# Patient Record
Sex: FEMALE | Race: WHITE | Employment: OTHER | URBAN - NONMETROPOLITAN AREA
[De-identification: names, ages, dates, MRNs, and addresses within clinical notes are randomized per-mention and may not be internally consistent; named-entity substitution may affect disease eponyms.]

---

## 2017-01-17 ENCOUNTER — ANTI-COAG VISIT (OUTPATIENT)
Dept: OTHER | Age: 82
End: 2017-01-17

## 2017-01-17 VITALS
WEIGHT: 175 LBS | BODY MASS INDEX: 28.25 KG/M2 | SYSTOLIC BLOOD PRESSURE: 135 MMHG | HEART RATE: 62 BPM | DIASTOLIC BLOOD PRESSURE: 70 MMHG

## 2017-01-17 DIAGNOSIS — I26.99 OTHER PULMONARY EMBOLISM WITHOUT ACUTE COR PULMONALE, UNSPECIFIED CHRONICITY (HCC): ICD-10-CM

## 2017-01-17 LAB — POC INR: 2.4 (ref 0.8–1.2)

## 2017-01-17 PROCEDURE — 85610 PROTHROMBIN TIME: CPT | Performed by: NURSE PRACTITIONER

## 2017-01-17 PROCEDURE — 99999 PR OFFICE/OUTPT VISIT,PROCEDURE ONLY: CPT | Performed by: NURSE PRACTITIONER

## 2017-01-17 ASSESSMENT — ENCOUNTER SYMPTOMS
BLOOD IN STOOL: 0
SHORTNESS OF BREATH: 0
CONSTIPATION: 0
DIARRHEA: 0

## 2017-02-02 ENCOUNTER — TELEPHONE (OUTPATIENT)
Dept: OTHER | Age: 82
End: 2017-02-02

## 2017-02-14 ENCOUNTER — ANTI-COAG VISIT (OUTPATIENT)
Dept: OTHER | Age: 82
End: 2017-02-14

## 2017-02-14 VITALS
WEIGHT: 173 LBS | HEART RATE: 61 BPM | SYSTOLIC BLOOD PRESSURE: 123 MMHG | BODY MASS INDEX: 27.92 KG/M2 | DIASTOLIC BLOOD PRESSURE: 67 MMHG

## 2017-02-14 DIAGNOSIS — I26.99 OTHER PULMONARY EMBOLISM WITHOUT ACUTE COR PULMONALE, UNSPECIFIED CHRONICITY (HCC): ICD-10-CM

## 2017-02-14 LAB — POC INR: 1.6 (ref 0.8–1.2)

## 2017-02-14 PROCEDURE — 85610 PROTHROMBIN TIME: CPT | Performed by: NURSE PRACTITIONER

## 2017-02-14 PROCEDURE — 99999 PR OFFICE/OUTPT VISIT,PROCEDURE ONLY: CPT | Performed by: NURSE PRACTITIONER

## 2017-02-14 ASSESSMENT — ENCOUNTER SYMPTOMS
BLOOD IN STOOL: 0
SHORTNESS OF BREATH: 0
DIARRHEA: 0
CONSTIPATION: 0

## 2017-03-13 DIAGNOSIS — I26.99 OTHER PULMONARY EMBOLISM WITHOUT ACUTE COR PULMONALE, UNSPECIFIED CHRONICITY (HCC): Primary | ICD-10-CM

## 2017-03-20 ENCOUNTER — ANTI-COAG VISIT (OUTPATIENT)
Dept: OTHER | Age: 82
End: 2017-03-20

## 2017-03-20 VITALS
SYSTOLIC BLOOD PRESSURE: 149 MMHG | BODY MASS INDEX: 27.73 KG/M2 | HEART RATE: 73 BPM | DIASTOLIC BLOOD PRESSURE: 86 MMHG | WEIGHT: 171.8 LBS

## 2017-03-20 DIAGNOSIS — I26.99 OTHER PULMONARY EMBOLISM WITHOUT ACUTE COR PULMONALE, UNSPECIFIED CHRONICITY (HCC): ICD-10-CM

## 2017-03-20 LAB — POC INR: 1.9 (ref 0.8–1.2)

## 2017-03-20 ASSESSMENT — ENCOUNTER SYMPTOMS
SHORTNESS OF BREATH: 1
CONSTIPATION: 0
DIARRHEA: 0
BLOOD IN STOOL: 0

## 2017-04-17 ENCOUNTER — ANTI-COAG VISIT (OUTPATIENT)
Dept: OTHER | Age: 82
End: 2017-04-17

## 2017-04-17 VITALS
DIASTOLIC BLOOD PRESSURE: 71 MMHG | HEART RATE: 70 BPM | WEIGHT: 171.8 LBS | BODY MASS INDEX: 27.73 KG/M2 | SYSTOLIC BLOOD PRESSURE: 132 MMHG

## 2017-04-17 DIAGNOSIS — I26.99 OTHER PULMONARY EMBOLISM WITHOUT ACUTE COR PULMONALE, UNSPECIFIED CHRONICITY (HCC): ICD-10-CM

## 2017-04-17 LAB — POC INR: 1.8 (ref 0.8–1.2)

## 2017-04-17 ASSESSMENT — ENCOUNTER SYMPTOMS
DIARRHEA: 0
BLOOD IN STOOL: 0
CONSTIPATION: 0
SHORTNESS OF BREATH: 0

## 2017-05-15 ENCOUNTER — ANTI-COAG VISIT (OUTPATIENT)
Dept: OTHER | Age: 82
End: 2017-05-15

## 2017-05-15 VITALS
DIASTOLIC BLOOD PRESSURE: 69 MMHG | SYSTOLIC BLOOD PRESSURE: 132 MMHG | BODY MASS INDEX: 27.57 KG/M2 | WEIGHT: 170.8 LBS | HEART RATE: 60 BPM

## 2017-05-15 DIAGNOSIS — I26.99 OTHER PULMONARY EMBOLISM WITHOUT ACUTE COR PULMONALE, UNSPECIFIED CHRONICITY (HCC): ICD-10-CM

## 2017-05-15 LAB
HCT VFR BLD CALC: 41.8 % (ref 37–47)
HEMOGLOBIN: 13.9 GM/DL (ref 12–16)
POC INR: 2.1 (ref 0.8–1.2)

## 2017-05-15 ASSESSMENT — ENCOUNTER SYMPTOMS
BLOOD IN STOOL: 0
CONSTIPATION: 0
DIARRHEA: 0
SHORTNESS OF BREATH: 0

## 2017-06-15 ENCOUNTER — ANTI-COAG VISIT (OUTPATIENT)
Dept: OTHER | Age: 82
End: 2017-06-15

## 2017-06-15 VITALS
SYSTOLIC BLOOD PRESSURE: 124 MMHG | BODY MASS INDEX: 28.05 KG/M2 | DIASTOLIC BLOOD PRESSURE: 66 MMHG | WEIGHT: 173.8 LBS | HEART RATE: 67 BPM

## 2017-06-15 DIAGNOSIS — I26.99 OTHER PULMONARY EMBOLISM WITHOUT ACUTE COR PULMONALE, UNSPECIFIED CHRONICITY (HCC): ICD-10-CM

## 2017-06-15 LAB
INR BLD: 2.6
POC INR: 2.6 (ref 0.8–1.2)

## 2017-06-15 RX ORDER — WARFARIN SODIUM 2 MG/1
TABLET ORAL
Qty: 60 TABLET | Refills: 11 | Status: SHIPPED | OUTPATIENT
Start: 2017-06-15 | End: 2018-06-26 | Stop reason: SDUPTHER

## 2017-06-15 ASSESSMENT — ENCOUNTER SYMPTOMS
SHORTNESS OF BREATH: 1
BLOOD IN STOOL: 0
DIARRHEA: 0
CONSTIPATION: 0

## 2017-07-13 ENCOUNTER — ANTI-COAG VISIT (OUTPATIENT)
Dept: OTHER | Age: 82
End: 2017-07-13

## 2017-07-13 VITALS
SYSTOLIC BLOOD PRESSURE: 138 MMHG | DIASTOLIC BLOOD PRESSURE: 71 MMHG | HEART RATE: 65 BPM | BODY MASS INDEX: 28.25 KG/M2 | WEIGHT: 175 LBS

## 2017-07-13 DIAGNOSIS — I26.99 OTHER PULMONARY EMBOLISM WITHOUT ACUTE COR PULMONALE, UNSPECIFIED CHRONICITY (HCC): ICD-10-CM

## 2017-07-13 LAB — POC INR: 2.5 (ref 0.8–1.2)

## 2017-07-13 ASSESSMENT — ENCOUNTER SYMPTOMS
SHORTNESS OF BREATH: 0
DIARRHEA: 0
CONSTIPATION: 0
BLOOD IN STOOL: 0

## 2017-08-10 ENCOUNTER — HOSPITAL ENCOUNTER (OUTPATIENT)
Dept: PHARMACY | Age: 82
Setting detail: THERAPIES SERIES
Discharge: HOME | End: 2017-08-10
Payer: MEDICARE

## 2017-08-10 VITALS
DIASTOLIC BLOOD PRESSURE: 68 MMHG | HEART RATE: 62 BPM | BODY MASS INDEX: 27.6 KG/M2 | WEIGHT: 171 LBS | SYSTOLIC BLOOD PRESSURE: 122 MMHG

## 2017-08-10 DIAGNOSIS — I26.99 OTHER PULMONARY EMBOLISM WITHOUT ACUTE COR PULMONALE, UNSPECIFIED CHRONICITY (HCC): ICD-10-CM

## 2017-08-10 LAB — POC INR: 2 (ref 0.8–1.2)

## 2017-08-10 PROCEDURE — 99211 OFF/OP EST MAY X REQ PHY/QHP: CPT

## 2017-08-10 PROCEDURE — 36416 COLLJ CAPILLARY BLOOD SPEC: CPT

## 2017-08-10 PROCEDURE — 85610 PROTHROMBIN TIME: CPT

## 2017-08-10 ASSESSMENT — ENCOUNTER SYMPTOMS
DIARRHEA: 0
BLOOD IN STOOL: 0
SHORTNESS OF BREATH: 0
CONSTIPATION: 0

## 2017-09-11 ENCOUNTER — HOSPITAL ENCOUNTER (OUTPATIENT)
Dept: PHARMACY | Age: 82
Setting detail: THERAPIES SERIES
Discharge: HOME | End: 2017-09-11
Payer: MEDICARE

## 2017-09-11 VITALS
SYSTOLIC BLOOD PRESSURE: 131 MMHG | HEART RATE: 62 BPM | WEIGHT: 170 LBS | DIASTOLIC BLOOD PRESSURE: 73 MMHG | BODY MASS INDEX: 27.44 KG/M2

## 2017-09-11 DIAGNOSIS — I26.99 OTHER PULMONARY EMBOLISM WITHOUT ACUTE COR PULMONALE, UNSPECIFIED CHRONICITY (HCC): ICD-10-CM

## 2017-09-11 LAB — POC INR: 2 (ref 0.8–1.2)

## 2017-09-11 PROCEDURE — 36416 COLLJ CAPILLARY BLOOD SPEC: CPT

## 2017-09-11 PROCEDURE — 99211 OFF/OP EST MAY X REQ PHY/QHP: CPT

## 2017-09-11 PROCEDURE — 85610 PROTHROMBIN TIME: CPT

## 2017-09-11 ASSESSMENT — ENCOUNTER SYMPTOMS
SHORTNESS OF BREATH: 0
DIARRHEA: 0
BLOOD IN STOOL: 0
CONSTIPATION: 0

## 2017-10-09 ENCOUNTER — HOSPITAL ENCOUNTER (OUTPATIENT)
Dept: PHARMACY | Age: 82
Setting detail: THERAPIES SERIES
Discharge: HOME | End: 2017-10-09
Payer: MEDICARE

## 2017-10-09 VITALS
HEART RATE: 80 BPM | BODY MASS INDEX: 28.34 KG/M2 | SYSTOLIC BLOOD PRESSURE: 128 MMHG | WEIGHT: 175.6 LBS | DIASTOLIC BLOOD PRESSURE: 75 MMHG

## 2017-10-09 DIAGNOSIS — I26.99 OTHER PULMONARY EMBOLISM WITHOUT ACUTE COR PULMONALE, UNSPECIFIED CHRONICITY (HCC): ICD-10-CM

## 2017-10-09 LAB — POC INR: 2.6 (ref 0.8–1.2)

## 2017-10-09 PROCEDURE — 85610 PROTHROMBIN TIME: CPT

## 2017-10-09 PROCEDURE — 99211 OFF/OP EST MAY X REQ PHY/QHP: CPT

## 2017-10-09 PROCEDURE — 36416 COLLJ CAPILLARY BLOOD SPEC: CPT

## 2017-10-09 ASSESSMENT — ENCOUNTER SYMPTOMS
DIARRHEA: 0
BLOOD IN STOOL: 0
SHORTNESS OF BREATH: 0
CONSTIPATION: 0

## 2017-10-09 NOTE — PROGRESS NOTES
The Medication Management [facility]  Anticoagulation Clinic  [phone] (phone)           [phone] (fax)    Visit Date: 10/9/2017     Subjective:       Patient ID: Estrella Biggs, 80 y.o., female    HPI  Patient seen in clinic for anticoagulation management for diagnosis of PE with a goal INR of 2.0-3.0. Last seen 4 weeks ago. States correct coumadin dose and tablet strength. Denies missed doses. Has been eating more dark greens over last month. Review of Systems   Constitutional: Negative for activity change, appetite change and fatigue. HENT: Negative for nosebleeds. Respiratory: Negative for shortness of breath. Cardiovascular: Negative for chest pain and leg swelling. Gastrointestinal: Negative for blood in stool, constipation and diarrhea. Genitourinary: Negative for hematuria. Neurological: Negative for dizziness, weakness, light-headedness and headaches. Hematological: Bruises/bleeds easily ( has a fading yellow bruise on right upper arm, origin unknown. No bleeding. ).        Objective:   Physical Exam   Vitals:    10/09/17 1320   BP: 128/75   Pulse: 80       Physical Exam   Constitutional: She is oriented to person, place, and time. She appears well-developed and well-nourished. Cardiovascular: Normal rate. Pulmonary/Chest: Effort normal.   Neurological: She is alert and oriented to person, place, and time. Psychiatric: She has a normal mood and affect. Her behavior is normal.       Assessment:     Lab Results   Component Value Date    INR 2.60 (H) 10/09/2017    INR 2.00 (H) 09/11/2017    INR 2.00 (H) 08/10/2017     INR therapeutic   Recent Labs      10/09/17   1320   INR  2.60*                          Plan:   POCT INR ordered and result reviewed. Continue Coumadin 2 mg po W, 4 mg po MTTHFSS. Recheck INR 4 weeks. Patient reminded to call the Anticoagulation Clinic with any signs or symptoms of bleeding or with any medication changes.   Patient

## 2017-11-06 ENCOUNTER — HOSPITAL ENCOUNTER (OUTPATIENT)
Dept: PHARMACY | Age: 82
Setting detail: THERAPIES SERIES
Discharge: HOME | End: 2017-11-06
Payer: MEDICARE

## 2017-11-06 VITALS
WEIGHT: 173.6 LBS | BODY MASS INDEX: 28.02 KG/M2 | DIASTOLIC BLOOD PRESSURE: 71 MMHG | HEART RATE: 66 BPM | SYSTOLIC BLOOD PRESSURE: 145 MMHG

## 2017-11-06 DIAGNOSIS — I26.99 OTHER PULMONARY EMBOLISM WITHOUT ACUTE COR PULMONALE, UNSPECIFIED CHRONICITY (HCC): ICD-10-CM

## 2017-11-06 LAB — POC INR: 2.9 (ref 0.8–1.2)

## 2017-11-06 PROCEDURE — 99211 OFF/OP EST MAY X REQ PHY/QHP: CPT

## 2017-11-06 PROCEDURE — 36416 COLLJ CAPILLARY BLOOD SPEC: CPT

## 2017-11-06 PROCEDURE — 85610 PROTHROMBIN TIME: CPT

## 2017-11-06 ASSESSMENT — ENCOUNTER SYMPTOMS
CONSTIPATION: 0
BLOOD IN STOOL: 0
SHORTNESS OF BREATH: 0
DIARRHEA: 0

## 2017-11-15 ENCOUNTER — TELEPHONE (OUTPATIENT)
Dept: PHARMACY | Age: 82
End: 2017-11-15

## 2017-11-15 NOTE — TELEPHONE ENCOUNTER
Emma called back and she said her dosing wrong. She is taking Ciparo 500 mg bid x 10 days and she started her medication last night 11/14/17.

## 2017-11-15 NOTE — TELEPHONE ENCOUNTER
Left message for patient to return call. Will have patient decrease dose to 2mg MWF, 4mg TThSS and rechk INR at beginning of next week.

## 2017-11-22 ENCOUNTER — HOSPITAL ENCOUNTER (OUTPATIENT)
Dept: PHARMACY | Age: 82
Setting detail: THERAPIES SERIES
Discharge: HOME | End: 2017-11-22
Payer: MEDICARE

## 2017-11-22 VITALS
DIASTOLIC BLOOD PRESSURE: 69 MMHG | BODY MASS INDEX: 28.25 KG/M2 | WEIGHT: 175 LBS | SYSTOLIC BLOOD PRESSURE: 129 MMHG | HEART RATE: 68 BPM

## 2017-11-22 DIAGNOSIS — I26.99 OTHER PULMONARY EMBOLISM WITHOUT ACUTE COR PULMONALE, UNSPECIFIED CHRONICITY (HCC): ICD-10-CM

## 2017-11-22 LAB — POC INR: 2 (ref 0.8–1.2)

## 2017-11-22 PROCEDURE — 36416 COLLJ CAPILLARY BLOOD SPEC: CPT

## 2017-11-22 PROCEDURE — 99211 OFF/OP EST MAY X REQ PHY/QHP: CPT

## 2017-11-22 PROCEDURE — 85610 PROTHROMBIN TIME: CPT

## 2017-11-22 NOTE — PROGRESS NOTES
The Medication Management [facility]  Anticoagulation Clinic  [phone] (phone)           [phone] (fax)    Visit Date: 11/22/2017     Subjective:       Patient ID: Flavia Curtis, 80 y.o., female    HPI      Patient presents for 5 week INR check. Patient in for anticoagulation management due to PE with a goal INR of 2.0-3.0. INR ordered and reviewed today. Patient reports the following:    Patient shows me her bottle of cipro and states one of the pills in the bottle is different than the other - identified the pill to be FiberCon. Medication changes: cipro 500 mg for 10 days (started on 11/14/17)  Tablet strength per patient: 2 mg   Patient reported dosing regimen over last 1 week: 2 mg MWF, 4 mg STTHS while on cipro  Missed doses in the last 1-2 weeks: no  Extra doses in the last 1-2 weeks: no  Any problems with bleeding/bruising? No  Any recent falls? No   Any signs or symptoms of DVT/PE or stroke? Yes, swelling in foot (patient believes it is arthritis) - states it is nothing new  Alcohol use: no   Tobacco use: no  Diet changes as follows: Hasn't been eating as much in last few weeks - trying to lose some weight  Upcoming surgeries or procedures: no  Appointment preference: PM    Objective:   Physical Exam   Vitals:    11/22/17 1543   BP: 129/69   Pulse: 68     Assessment:     Lab Results   Component Value Date    INR 2.00 (H) 11/22/2017    INR 2.90 (H) 11/06/2017    INR 2.60 (H) 10/09/2017     INR therapeutic   Recent Labs      11/22/17   1517   INR  2.00*                          Plan:     Continue Coumadin 2 mg W, 4 mg SMTTHFS. Recheck INR 5 weeks. Patient reminded to call the Anticoagulation Clinic with any signs or symptoms of bleeding or with any medication changes. Patient given instructions utilizing the teach back method. Discharged ambulatory in no apparent distress.

## 2017-12-27 ENCOUNTER — HOSPITAL ENCOUNTER (OUTPATIENT)
Dept: PHARMACY | Age: 82
Setting detail: THERAPIES SERIES
Discharge: HOME | End: 2017-12-27
Payer: MEDICARE

## 2017-12-27 DIAGNOSIS — I26.99 OTHER PULMONARY EMBOLISM WITHOUT ACUTE COR PULMONALE, UNSPECIFIED CHRONICITY (HCC): ICD-10-CM

## 2017-12-27 LAB — POC INR: 3.9 (ref 0.8–1.2)

## 2017-12-27 PROCEDURE — 99211 OFF/OP EST MAY X REQ PHY/QHP: CPT

## 2017-12-27 PROCEDURE — 36416 COLLJ CAPILLARY BLOOD SPEC: CPT

## 2017-12-27 PROCEDURE — 85610 PROTHROMBIN TIME: CPT

## 2017-12-27 NOTE — PROGRESS NOTES
The [address] [place] [address]  Anticoagulation Clinic  [phone] (phone)  [phone] (fax)  Patient presents for 5 week INR check. Patient in for anticoagulation management due to PE with a goal INR of 2.0-3.0. INR ordered and reviewed today. Patient reports the following:    Medication changes: no change  Tablet strength per patient: 2 mg   Patient reported dosing regimen over last 1 week: 2 mg W, 4 mg SMTTHFS  Missed doses in the last 1-2 weeks: no  Extra doses in the last 1-2 weeks: no  Any problems with bleeding/bruising? No  Any recent falls? Hit head with car door - did not fall  Any signs or symptoms of DVT/PE or stroke? SOB at baseline  Alcohol use: no change  Tobacco use: no change  Diet changes as follows: rice and broccoli  Upcoming surgeries or procedures: no  Appointment preference: PM  Patient states she has been under a lot of stress with weather, holidays and family parties. Lab Results   Component Value Date    INR 2.00 (H) 11/22/2017    INR 2.90 (H) 11/06/2017    INR 2.60 (H) 10/09/2017       Plan:  Jamas Case today 12/17/17, then 2 mg tomorrow (12/25/17) then continue Coumadin 2 mg W, 4 mg SMTTHFS. Recheck INR 2 weeks. Patient reminded to call the Anticoagulation Clinic with any signs or symptoms of bleeding or with any medication changes. Patient given instructions utilizing the teach back method. Discharged ambulatory in no apparent distress. After visit summary printed and reviewed with patient.       Medications reviewed and updated on home medication list Yes    Influenza vaccine:     [] given    [x] declined   [] received previously   [] plans to receive at a later time   [x] refused    [] documented in EPIC

## 2018-01-09 ENCOUNTER — HOSPITAL ENCOUNTER (OUTPATIENT)
Dept: PHARMACY | Age: 83
Setting detail: THERAPIES SERIES
Discharge: HOME | End: 2018-01-09
Payer: MEDICARE

## 2018-01-09 DIAGNOSIS — I26.99 OTHER PULMONARY EMBOLISM WITHOUT ACUTE COR PULMONALE, UNSPECIFIED CHRONICITY (HCC): ICD-10-CM

## 2018-01-09 LAB — POC INR: 2.1 (ref 0.8–1.2)

## 2018-01-09 PROCEDURE — 36416 COLLJ CAPILLARY BLOOD SPEC: CPT

## 2018-01-09 PROCEDURE — 99211 OFF/OP EST MAY X REQ PHY/QHP: CPT

## 2018-01-09 PROCEDURE — 85610 PROTHROMBIN TIME: CPT

## 2018-01-30 ENCOUNTER — HOSPITAL ENCOUNTER (OUTPATIENT)
Dept: PHARMACY | Age: 83
Setting detail: THERAPIES SERIES
Discharge: HOME | End: 2018-01-30
Payer: MEDICARE

## 2018-01-30 VITALS — TEMPERATURE: 98.5 F

## 2018-01-30 DIAGNOSIS — I26.99 OTHER PULMONARY EMBOLISM WITHOUT ACUTE COR PULMONALE, UNSPECIFIED CHRONICITY (HCC): ICD-10-CM

## 2018-01-30 LAB — POC INR: 2.5 (ref 0.8–1.2)

## 2018-01-30 PROCEDURE — 90686 IIV4 VACC NO PRSV 0.5 ML IM: CPT

## 2018-01-30 PROCEDURE — 6360000002 HC RX W HCPCS

## 2018-01-30 PROCEDURE — 85610 PROTHROMBIN TIME: CPT

## 2018-01-30 PROCEDURE — G0008 ADMIN INFLUENZA VIRUS VAC: HCPCS

## 2018-01-30 PROCEDURE — G0463 HOSPITAL OUTPT CLINIC VISIT: HCPCS

## 2018-01-30 PROCEDURE — 36416 COLLJ CAPILLARY BLOOD SPEC: CPT

## 2018-01-30 RX ORDER — 1.1% SODIUM FLUORIDE PRESCRIPTION DENTAL CREAM 5 MG/G
CREAM DENTAL 2 TIMES DAILY
COMMUNITY
Start: 2018-01-06

## 2018-01-30 RX ADMIN — INFLUENZA A VIRUS A/SINGAPORE/GP1908/2015 IVR-180A (H1N1) ANTIGEN (PROPIOLACTONE INACTIVATED), INFLUENZA A VIRUS A/HONG KONG/4801/2014 X-263B (H3N2) ANTIGEN (PROPIOLACTONE INACTIVATED), INFLUENZA B VIRUS B/BRISBANE/46/2015 ANTIGEN (PROPIOLACTONE INACTIVATED), AND INFLUENZA B VIRUS B/PHUKET/3073/2013 BVR-1B ANTIGEN (PROPIOLACTONE INACTIVATED) 0.5 ML: 15; 15; 15; 15 INJECTION, SUSPENSION INTRAMUSCULAR at 13:30

## 2018-02-04 ENCOUNTER — HOSPITAL ENCOUNTER (INPATIENT)
Age: 83
LOS: 3 days | Discharge: HOME | DRG: 193 | End: 2018-02-07
Attending: INTERNAL MEDICINE | Admitting: INTERNAL MEDICINE
Payer: MEDICARE

## 2018-02-04 ENCOUNTER — APPOINTMENT (OUTPATIENT)
Dept: GENERAL RADIOLOGY | Age: 83
DRG: 193 | End: 2018-02-04
Payer: MEDICARE

## 2018-02-04 DIAGNOSIS — J10.1 INFLUENZA B: Primary | ICD-10-CM

## 2018-02-04 DIAGNOSIS — E86.0 DEHYDRATION: ICD-10-CM

## 2018-02-04 DIAGNOSIS — R74.01 TRANSAMINASEMIA: ICD-10-CM

## 2018-02-04 LAB
ALBUMIN SERPL-MCNC: 3.7 G/DL (ref 3.5–5.1)
ALLEN TEST: POSITIVE
ALP BLD-CCNC: 86 U/L (ref 38–126)
ALT SERPL-CCNC: 86 U/L (ref 11–66)
ANION GAP SERPL CALCULATED.3IONS-SCNC: 16 MEQ/L (ref 8–16)
AST SERPL-CCNC: 134 U/L (ref 5–40)
BASE EXCESS (CALCULATED): 2.1 MMOL/L (ref -2.5–2.5)
BASOPHILS # BLD: 0.1 %
BASOPHILS ABSOLUTE: 0 THOU/MM3 (ref 0–0.1)
BILIRUB SERPL-MCNC: 0.4 MG/DL (ref 0.3–1.2)
BUN BLDV-MCNC: 19 MG/DL (ref 7–22)
CALCIUM SERPL-MCNC: 8.5 MG/DL (ref 8.5–10.5)
CHLORIDE BLD-SCNC: 94 MEQ/L (ref 98–111)
CO2: 26 MEQ/L (ref 23–33)
COLLECTED BY:: ABNORMAL
CREAT SERPL-MCNC: 0.8 MG/DL (ref 0.4–1.2)
DEVICE: ABNORMAL
EKG ATRIAL RATE: 74 BPM
EKG P AXIS: 53 DEGREES
EKG P-R INTERVAL: 156 MS
EKG Q-T INTERVAL: 414 MS
EKG QRS DURATION: 78 MS
EKG QTC CALCULATION (BAZETT): 459 MS
EKG R AXIS: -3 DEGREES
EKG T AXIS: 4 DEGREES
EKG VENTRICULAR RATE: 74 BPM
EOSINOPHIL # BLD: 0 %
EOSINOPHILS ABSOLUTE: 0 THOU/MM3 (ref 0–0.4)
FLU A ANTIGEN: NEGATIVE
FLU B ANTIGEN: POSITIVE
GFR SERPL CREATININE-BSD FRML MDRD: 68 ML/MIN/1.73M2
GLUCOSE BLD-MCNC: 144 MG/DL (ref 70–108)
HCO3: 28 MMOL/L (ref 23–28)
HCT VFR BLD CALC: 41.3 % (ref 37–47)
HEMOGLOBIN: 13.5 GM/DL (ref 12–16)
IFIO2: 2
INR BLD: 1.58 (ref 0.85–1.13)
LACTIC ACID: 1.7 MMOL/L (ref 0.5–2.2)
LYMPHOCYTES # BLD: 14.6 %
LYMPHOCYTES ABSOLUTE: 1 THOU/MM3 (ref 1–4.8)
MCH RBC QN AUTO: 28.2 PG (ref 27–31)
MCHC RBC AUTO-ENTMCNC: 32.7 GM/DL (ref 33–37)
MCV RBC AUTO: 86.3 FL (ref 81–99)
MONOCYTES # BLD: 9.7 %
MONOCYTES ABSOLUTE: 0.7 THOU/MM3 (ref 0.4–1.3)
NUCLEATED RED BLOOD CELLS: 0 /100 WBC
O2 SATURATION: 92 %
OSMOLALITY CALCULATION: 276.7 MOSMOL/KG (ref 275–300)
PCO2: 47 MMHG (ref 35–45)
PDW BLD-RTO: 14.2 % (ref 11.5–14.5)
PH BLOOD GAS: 7.38 (ref 7.35–7.45)
PLATELET # BLD: 152 THOU/MM3 (ref 130–400)
PMV BLD AUTO: 9.9 FL (ref 7.4–10.4)
PO2: 65 MMHG (ref 71–104)
POTASSIUM SERPL-SCNC: 3.6 MEQ/L (ref 3.5–5.2)
RBC # BLD: 4.79 MILL/MM3 (ref 4.2–5.4)
SEG NEUTROPHILS: 75.6 %
SEGMENTED NEUTROPHILS ABSOLUTE COUNT: 5.4 THOU/MM3 (ref 1.8–7.7)
SODIUM BLD-SCNC: 136 MEQ/L (ref 135–145)
TOTAL PROTEIN: 7.2 G/DL (ref 6.1–8)
WBC # BLD: 7.1 THOU/MM3 (ref 4.8–10.8)

## 2018-02-04 PROCEDURE — 2580000003 HC RX 258: Performed by: INTERNAL MEDICINE

## 2018-02-04 PROCEDURE — 85025 COMPLETE CBC W/AUTO DIFF WBC: CPT

## 2018-02-04 PROCEDURE — 6370000000 HC RX 637 (ALT 250 FOR IP): Performed by: INTERNAL MEDICINE

## 2018-02-04 PROCEDURE — 93005 ELECTROCARDIOGRAM TRACING: CPT

## 2018-02-04 PROCEDURE — 87804 INFLUENZA ASSAY W/OPTIC: CPT

## 2018-02-04 PROCEDURE — 96374 THER/PROPH/DIAG INJ IV PUSH: CPT

## 2018-02-04 PROCEDURE — 80053 COMPREHEN METABOLIC PANEL: CPT

## 2018-02-04 PROCEDURE — 93000 ELECTROCARDIOGRAM COMPLETE: CPT | Performed by: INTERNAL MEDICINE

## 2018-02-04 PROCEDURE — 99285 EMERGENCY DEPT VISIT HI MDM: CPT

## 2018-02-04 PROCEDURE — 83605 ASSAY OF LACTIC ACID: CPT

## 2018-02-04 PROCEDURE — 71046 X-RAY EXAM CHEST 2 VIEWS: CPT

## 2018-02-04 PROCEDURE — 36415 COLL VENOUS BLD VENIPUNCTURE: CPT

## 2018-02-04 PROCEDURE — 6360000002 HC RX W HCPCS: Performed by: INTERNAL MEDICINE

## 2018-02-04 PROCEDURE — 85610 PROTHROMBIN TIME: CPT

## 2018-02-04 PROCEDURE — 82803 BLOOD GASES ANY COMBINATION: CPT

## 2018-02-04 PROCEDURE — 87040 BLOOD CULTURE FOR BACTERIA: CPT

## 2018-02-04 PROCEDURE — 1200000000 HC SEMI PRIVATE

## 2018-02-04 RX ORDER — DILTIAZEM HYDROCHLORIDE 180 MG/1
360 CAPSULE, COATED, EXTENDED RELEASE ORAL DAILY
Status: DISCONTINUED | OUTPATIENT
Start: 2018-02-04 | End: 2018-02-07 | Stop reason: HOSPADM

## 2018-02-04 RX ORDER — ONDANSETRON 2 MG/ML
4 INJECTION INTRAMUSCULAR; INTRAVENOUS EVERY 6 HOURS PRN
Status: DISCONTINUED | OUTPATIENT
Start: 2018-02-04 | End: 2018-02-07 | Stop reason: HOSPADM

## 2018-02-04 RX ORDER — 0.9 % SODIUM CHLORIDE 0.9 %
1000 INTRAVENOUS SOLUTION INTRAVENOUS ONCE
Status: COMPLETED | OUTPATIENT
Start: 2018-02-04 | End: 2018-02-04

## 2018-02-04 RX ORDER — POTASSIUM CHLORIDE 7.45 MG/ML
10 INJECTION INTRAVENOUS PRN
Status: DISCONTINUED | OUTPATIENT
Start: 2018-02-04 | End: 2018-02-07 | Stop reason: HOSPADM

## 2018-02-04 RX ORDER — OSELTAMIVIR PHOSPHATE 75 MG/1
75 CAPSULE ORAL 2 TIMES DAILY
Status: DISCONTINUED | OUTPATIENT
Start: 2018-02-04 | End: 2018-02-07 | Stop reason: HOSPADM

## 2018-02-04 RX ORDER — ACETAMINOPHEN 325 MG/1
650 TABLET ORAL EVERY 4 HOURS PRN
Status: DISCONTINUED | OUTPATIENT
Start: 2018-02-04 | End: 2018-02-07 | Stop reason: HOSPADM

## 2018-02-04 RX ORDER — POTASSIUM CHLORIDE 20 MEQ/1
40 TABLET, EXTENDED RELEASE ORAL PRN
Status: DISCONTINUED | OUTPATIENT
Start: 2018-02-04 | End: 2018-02-07 | Stop reason: HOSPADM

## 2018-02-04 RX ORDER — ATORVASTATIN CALCIUM 40 MG/1
40 TABLET, FILM COATED ORAL EVERY EVENING
Status: DISCONTINUED | OUTPATIENT
Start: 2018-02-04 | End: 2018-02-07 | Stop reason: HOSPADM

## 2018-02-04 RX ORDER — ONDANSETRON 4 MG/1
4 TABLET, FILM COATED ORAL 4 TIMES DAILY PRN
Status: DISCONTINUED | OUTPATIENT
Start: 2018-02-04 | End: 2018-02-07 | Stop reason: HOSPADM

## 2018-02-04 RX ORDER — WARFARIN SODIUM 3 MG/1
6 TABLET ORAL
Status: COMPLETED | OUTPATIENT
Start: 2018-02-04 | End: 2018-02-04

## 2018-02-04 RX ORDER — SODIUM CHLORIDE 0.9 % (FLUSH) 0.9 %
10 SYRINGE (ML) INJECTION EVERY 12 HOURS SCHEDULED
Status: DISCONTINUED | OUTPATIENT
Start: 2018-02-04 | End: 2018-02-07 | Stop reason: HOSPADM

## 2018-02-04 RX ORDER — POTASSIUM CHLORIDE 20MEQ/15ML
40 LIQUID (ML) ORAL PRN
Status: DISCONTINUED | OUTPATIENT
Start: 2018-02-04 | End: 2018-02-07 | Stop reason: HOSPADM

## 2018-02-04 RX ORDER — SODIUM CHLORIDE 9 MG/ML
INJECTION, SOLUTION INTRAVENOUS CONTINUOUS
Status: DISCONTINUED | OUTPATIENT
Start: 2018-02-04 | End: 2018-02-07 | Stop reason: HOSPADM

## 2018-02-04 RX ORDER — ONDANSETRON 2 MG/ML
4 INJECTION INTRAMUSCULAR; INTRAVENOUS ONCE
Status: COMPLETED | OUTPATIENT
Start: 2018-02-04 | End: 2018-02-04

## 2018-02-04 RX ORDER — LEVOTHYROXINE SODIUM 0.12 MG/1
125 TABLET ORAL DAILY
Status: DISCONTINUED | OUTPATIENT
Start: 2018-02-05 | End: 2018-02-07 | Stop reason: HOSPADM

## 2018-02-04 RX ORDER — SODIUM CHLORIDE 0.9 % (FLUSH) 0.9 %
10 SYRINGE (ML) INJECTION PRN
Status: DISCONTINUED | OUTPATIENT
Start: 2018-02-04 | End: 2018-02-07 | Stop reason: HOSPADM

## 2018-02-04 RX ADMIN — SODIUM CHLORIDE 1000 ML: 9 INJECTION, SOLUTION INTRAVENOUS at 15:19

## 2018-02-04 RX ADMIN — ATORVASTATIN CALCIUM 40 MG: 40 TABLET, FILM COATED ORAL at 21:59

## 2018-02-04 RX ADMIN — WARFARIN SODIUM 6 MG: 3 TABLET ORAL at 21:59

## 2018-02-04 RX ADMIN — SODIUM CHLORIDE: 9 INJECTION, SOLUTION INTRAVENOUS at 17:30

## 2018-02-04 RX ADMIN — OSELTAMIVIR PHOSPHATE 75 MG: 75 CAPSULE ORAL at 21:59

## 2018-02-04 RX ADMIN — ONDANSETRON 4 MG: 2 INJECTION INTRAMUSCULAR; INTRAVENOUS at 15:22

## 2018-02-04 RX ADMIN — ENOXAPARIN SODIUM 40 MG: 40 INJECTION SUBCUTANEOUS at 22:03

## 2018-02-04 ASSESSMENT — ENCOUNTER SYMPTOMS
COUGH: 1
ABDOMINAL PAIN: 1
EYE DISCHARGE: 0
DIARRHEA: 0
WHEEZING: 0
NAUSEA: 1
SORE THROAT: 0
SHORTNESS OF BREATH: 1
EYE PAIN: 0
VOMITING: 1
BACK PAIN: 0
RHINORRHEA: 0

## 2018-02-04 NOTE — ED NOTES
Patient states nausea has improved      Geoffrey Stroud, [address] [facility]  02/04/18 7428

## 2018-02-04 NOTE — ED PROVIDER NOTES
[facility]  eMERGENCY dEPARTMENT eNCOUnter          [address] [facility]       Chief Complaint   Patient presents with    Cough    Nausea    Emesis       Nurses Notes reviewed and I agree except as noted in the HPI. HISTORY OF PRESENT ILLNESS    Mili Baeza is a 80 y.o. female who presents to the Emergency Department for the evaluation of nausea, cough, and emesis. The daughter of the patient states on 1/31/18 she had a wellness check with her PCP where she received the Influenza vaccine. The daughter states the following day the patient developed cough with phlegm, fever, nausea, and low-grade fever. The patient states she also has mild epigastric abdominal pain with cough, shortness of breath, and emesis with food intake. The patient states she takes Coumadin daily but has stopped since onset of symptoms. The daughter denies the patient taking oxygen at home. The patient denies having diarrhea, constipation, chest pain, back pain, leg edema, or diaphoresis. The HPI was provided by the patient. REVIEW OF SYSTEMS     Review of Systems   Constitutional: Positive for fever. Negative for appetite change, chills and fatigue. HENT: Negative for congestion, ear pain, rhinorrhea and sore throat. Eyes: Negative for pain, discharge and visual disturbance. Respiratory: Positive for cough (with phlegm) and shortness of breath. Negative for wheezing. Cardiovascular: Negative for chest pain, palpitations and leg swelling. Gastrointestinal: Positive for abdominal pain (epigastric due to cough), nausea and vomiting. Negative for diarrhea. Genitourinary: Negative for difficulty urinating, dysuria and vaginal discharge. Musculoskeletal: Negative for arthralgias, back pain, joint swelling and neck pain. Skin: Negative for pallor and rash. Neurological: Negative for dizziness, syncope, weakness, light-headedness and headaches. Hematological: Negative for adenopathy. father. SOCIAL HISTORY      reports that she has never smoked. She has never used smokeless tobacco. She reports that she does not drink alcohol or use drugs. PHYSICAL EXAM     INITIAL VITALS:  height is 5' 6\" (1.676 m) and weight is 175 lb (79.4 kg). Her oral temperature is 98 °F (36.7 °C). Her blood pressure is 118/61 and her pulse is 64. Her respiration is 16 and oxygen saturation is 93%. Physical Exam   Constitutional: She is oriented to person, place, and time. She appears well-developed and well-nourished. Nasal cannula in place. HENT:   Head: Normocephalic and atraumatic. Right Ear: External ear normal.   Left Ear: External ear normal.   Mouth/Throat: Mucous membranes are not pale and dry. Posterior oropharyngeal erythema present. Eyes: Conjunctivae and EOM are normal. Pupils are equal, round, and reactive to light. Right eye exhibits no discharge. Left eye exhibits no discharge. No scleral icterus. Neck: Normal range of motion. Neck supple. No JVD present. Cardiovascular: Normal rate, regular rhythm and normal heart sounds. Exam reveals no gallop and no friction rub. No murmur heard. Pulmonary/Chest: Effort normal and breath sounds normal. No respiratory distress. She has no decreased breath sounds. She has no wheezes. She has no rhonchi. She has no rales. Abdominal: Soft. She exhibits no distension. There is no tenderness. There is no rebound and no guarding. Musculoskeletal: Normal range of motion. She exhibits no edema. Neurological: She is alert and oriented to person, place, and time. She has normal strength. No cranial nerve deficit or sensory deficit. She exhibits normal muscle tone. She displays a negative Romberg sign. She displays no seizure activity. Coordination and gait normal. GCS eye subscore is 4. GCS verbal subscore is 5. GCS motor subscore is 6. Cranial nerves intact    Skin: Skin is warm and dry. No rash noted. She is not diaphoretic.    Psychiatric: She has

## 2018-02-05 LAB
ANION GAP SERPL CALCULATED.3IONS-SCNC: 16 MEQ/L (ref 8–16)
BACTERIA: ABNORMAL /HPF
BILIRUBIN URINE: NEGATIVE
BLOOD, URINE: ABNORMAL
BUN BLDV-MCNC: 16 MG/DL (ref 7–22)
CALCIUM SERPL-MCNC: 8.3 MG/DL (ref 8.5–10.5)
CASTS 2: ABNORMAL /LPF
CASTS UA: ABNORMAL /LPF
CHARACTER, URINE: CLEAR
CHLORIDE BLD-SCNC: 98 MEQ/L (ref 98–111)
CO2: 27 MEQ/L (ref 23–33)
COLOR: YELLOW
CREAT SERPL-MCNC: 0.6 MG/DL (ref 0.4–1.2)
CRYSTALS, UA: ABNORMAL
EPITHELIAL CELLS, UA: ABNORMAL /HPF
GFR SERPL CREATININE-BSD FRML MDRD: > 90 ML/MIN/1.73M2
GLUCOSE BLD-MCNC: 93 MG/DL (ref 70–108)
GLUCOSE URINE: NEGATIVE MG/DL
INR BLD: 1.72 (ref 0.85–1.13)
KETONES, URINE: NEGATIVE
LEUKOCYTE ESTERASE, URINE: NEGATIVE
MAGNESIUM: 1.9 MG/DL (ref 1.6–2.4)
MISCELLANEOUS 2: ABNORMAL
NITRITE, URINE: NEGATIVE
PH UA: 5
POTASSIUM REFLEX MAGNESIUM: 3.2 MEQ/L (ref 3.5–5.2)
PROTEIN UA: NEGATIVE
RBC URINE: ABNORMAL /HPF
RENAL EPITHELIAL, UA: ABNORMAL
SODIUM BLD-SCNC: 141 MEQ/L (ref 135–145)
SPECIFIC GRAVITY, URINE: 1.02 (ref 1–1.03)
UROBILINOGEN, URINE: 0.2 EU/DL
WBC UA: ABNORMAL /HPF
YEAST: ABNORMAL

## 2018-02-05 PROCEDURE — 80048 BASIC METABOLIC PNL TOTAL CA: CPT

## 2018-02-05 PROCEDURE — 36415 COLL VENOUS BLD VENIPUNCTURE: CPT

## 2018-02-05 PROCEDURE — 81001 URINALYSIS AUTO W/SCOPE: CPT

## 2018-02-05 PROCEDURE — 6370000000 HC RX 637 (ALT 250 FOR IP): Performed by: INTERNAL MEDICINE

## 2018-02-05 PROCEDURE — 85610 PROTHROMBIN TIME: CPT

## 2018-02-05 PROCEDURE — 83735 ASSAY OF MAGNESIUM: CPT

## 2018-02-05 PROCEDURE — 2580000003 HC RX 258: Performed by: INTERNAL MEDICINE

## 2018-02-05 PROCEDURE — 1200000000 HC SEMI PRIVATE

## 2018-02-05 PROCEDURE — 2700000000 HC OXYGEN THERAPY PER DAY

## 2018-02-05 PROCEDURE — 94640 AIRWAY INHALATION TREATMENT: CPT

## 2018-02-05 PROCEDURE — 6360000002 HC RX W HCPCS: Performed by: INTERNAL MEDICINE

## 2018-02-05 RX ORDER — WARFARIN SODIUM 3 MG/1
6 TABLET ORAL ONCE
Status: COMPLETED | OUTPATIENT
Start: 2018-02-05 | End: 2018-02-05

## 2018-02-05 RX ORDER — IPRATROPIUM BROMIDE AND ALBUTEROL SULFATE 2.5; .5 MG/3ML; MG/3ML
1 SOLUTION RESPIRATORY (INHALATION) EVERY 4 HOURS PRN
Status: DISCONTINUED | OUTPATIENT
Start: 2018-02-05 | End: 2018-02-07 | Stop reason: HOSPADM

## 2018-02-05 RX ORDER — GUAIFENESIN 600 MG/1
600 TABLET, EXTENDED RELEASE ORAL 2 TIMES DAILY
Status: DISCONTINUED | OUTPATIENT
Start: 2018-02-05 | End: 2018-02-07 | Stop reason: HOSPADM

## 2018-02-05 RX ORDER — POTASSIUM CHLORIDE 750 MG/1
40 TABLET, FILM COATED, EXTENDED RELEASE ORAL ONCE
Status: COMPLETED | OUTPATIENT
Start: 2018-02-05 | End: 2018-02-05

## 2018-02-05 RX ADMIN — LEVOTHYROXINE SODIUM 125 MCG: 125 TABLET ORAL at 06:29

## 2018-02-05 RX ADMIN — GUAIFENESIN 600 MG: 600 TABLET, EXTENDED RELEASE ORAL at 05:04

## 2018-02-05 RX ADMIN — WARFARIN SODIUM 6 MG: 3 TABLET ORAL at 18:08

## 2018-02-05 RX ADMIN — POTASSIUM CHLORIDE 40 MEQ: 750 TABLET, FILM COATED, EXTENDED RELEASE ORAL at 09:56

## 2018-02-05 RX ADMIN — SODIUM CHLORIDE: 9 INJECTION, SOLUTION INTRAVENOUS at 21:57

## 2018-02-05 RX ADMIN — IPRATROPIUM BROMIDE AND ALBUTEROL SULFATE 1 AMPULE: .5; 3 SOLUTION RESPIRATORY (INHALATION) at 23:36

## 2018-02-05 RX ADMIN — DILTIAZEM HYDROCHLORIDE 360 MG: 180 CAPSULE, COATED, EXTENDED RELEASE ORAL at 11:37

## 2018-02-05 RX ADMIN — SODIUM CHLORIDE: 9 INJECTION, SOLUTION INTRAVENOUS at 06:29

## 2018-02-05 RX ADMIN — OSELTAMIVIR PHOSPHATE 75 MG: 75 CAPSULE ORAL at 20:26

## 2018-02-05 RX ADMIN — ATORVASTATIN CALCIUM 40 MG: 40 TABLET, FILM COATED ORAL at 18:07

## 2018-02-05 RX ADMIN — GUAIFENESIN 600 MG: 600 TABLET, EXTENDED RELEASE ORAL at 20:21

## 2018-02-05 RX ADMIN — OSELTAMIVIR PHOSPHATE 75 MG: 75 CAPSULE ORAL at 09:56

## 2018-02-05 RX ADMIN — ENOXAPARIN SODIUM 40 MG: 40 INJECTION SUBCUTANEOUS at 20:21

## 2018-02-05 RX ADMIN — IPRATROPIUM BROMIDE AND ALBUTEROL SULFATE 1 AMPULE: .5; 3 SOLUTION RESPIRATORY (INHALATION) at 04:51

## 2018-02-05 NOTE — PROGRESS NOTES
Clinical Pharmacy Note    Ruperto Nieto is a 80 y.o. female for whom pharmacy has been asked to manage warfarin therapy. Reason for Admission: influenza    Consulting Physician: López Delgado  Warfarin dose prior to admission: 2 mg po W, 4 mg po MTTHFSS  Warfarin indication: PE  Target INR range: 2-3   Outpatient warfarin provider: [facility]    Past Medical History:   Diagnosis Date    Arthritis     wrist, back, neck, foot    Blood circulation, collateral     Breathing difficulty     Chronic kidney disease     DVT (deep venous thrombosis) ([facility] [address]     History of blood transfusion 1960's    child birth   [facility] Hx of blood clots 2013? ??    left leg    Hyperlipidemia     Hypertension     Pneumonia     Prolonged emergence from general anesthesia     with hysterectomy? ?    Thyroid disease     Unspecified diseases of blood and blood-forming organs                 Recent Labs      02/04/18   1534   INR  1.58*     Recent Labs      02/04/18   1533   HGB  13.5   HCT  41.3   PLT  152       Current warfarin drug-drug interactions: enoxaparin      Date INR Warfarin Dose   2/4/18 1.58 6 mg                                   Daily PT/INR until stable within therapeutic range. Thank you for the consult.

## 2018-02-06 ENCOUNTER — APPOINTMENT (OUTPATIENT)
Dept: GENERAL RADIOLOGY | Age: 83
DRG: 193 | End: 2018-02-06
Payer: MEDICARE

## 2018-02-06 LAB
ANION GAP SERPL CALCULATED.3IONS-SCNC: 12 MEQ/L (ref 8–16)
BUN BLDV-MCNC: 11 MG/DL (ref 7–22)
CALCIUM SERPL-MCNC: 7.9 MG/DL (ref 8.5–10.5)
CHLORIDE BLD-SCNC: 99 MEQ/L (ref 98–111)
CO2: 28 MEQ/L (ref 23–33)
CREAT SERPL-MCNC: 0.5 MG/DL (ref 0.4–1.2)
GFR SERPL CREATININE-BSD FRML MDRD: > 90 ML/MIN/1.73M2
GLUCOSE BLD-MCNC: 89 MG/DL (ref 70–108)
INR BLD: 2.33 (ref 0.85–1.13)
POTASSIUM SERPL-SCNC: 3.5 MEQ/L (ref 3.5–5.2)
SODIUM BLD-SCNC: 139 MEQ/L (ref 135–145)
TSH SERPL DL<=0.05 MIU/L-ACNC: 0.3 UIU/ML (ref 0.4–4.2)

## 2018-02-06 PROCEDURE — 36415 COLL VENOUS BLD VENIPUNCTURE: CPT

## 2018-02-06 PROCEDURE — 6370000000 HC RX 637 (ALT 250 FOR IP): Performed by: INTERNAL MEDICINE

## 2018-02-06 PROCEDURE — 85610 PROTHROMBIN TIME: CPT

## 2018-02-06 PROCEDURE — 84443 ASSAY THYROID STIM HORMONE: CPT

## 2018-02-06 PROCEDURE — 6370000000 HC RX 637 (ALT 250 FOR IP): Performed by: PHARMACIST

## 2018-02-06 PROCEDURE — 2580000003 HC RX 258: Performed by: INTERNAL MEDICINE

## 2018-02-06 PROCEDURE — 94640 AIRWAY INHALATION TREATMENT: CPT

## 2018-02-06 PROCEDURE — 6370000000 HC RX 637 (ALT 250 FOR IP)

## 2018-02-06 PROCEDURE — 2700000000 HC OXYGEN THERAPY PER DAY

## 2018-02-06 PROCEDURE — 71045 X-RAY EXAM CHEST 1 VIEW: CPT

## 2018-02-06 PROCEDURE — 1200000000 HC SEMI PRIVATE

## 2018-02-06 PROCEDURE — 80048 BASIC METABOLIC PNL TOTAL CA: CPT

## 2018-02-06 PROCEDURE — 6360000002 HC RX W HCPCS: Performed by: INTERNAL MEDICINE

## 2018-02-06 RX ORDER — WARFARIN SODIUM 4 MG/1
4 TABLET ORAL ONCE
Status: COMPLETED | OUTPATIENT
Start: 2018-02-06 | End: 2018-02-06

## 2018-02-06 RX ORDER — POTASSIUM CHLORIDE 750 MG/1
40 TABLET, FILM COATED, EXTENDED RELEASE ORAL ONCE
Status: COMPLETED | OUTPATIENT
Start: 2018-02-06 | End: 2018-02-06

## 2018-02-06 RX ADMIN — ONDANSETRON 4 MG: 2 INJECTION INTRAMUSCULAR; INTRAVENOUS at 02:28

## 2018-02-06 RX ADMIN — SODIUM CHLORIDE: 9 INJECTION, SOLUTION INTRAVENOUS at 12:22

## 2018-02-06 RX ADMIN — WARFARIN SODIUM 4 MG: 4 TABLET ORAL at 17:33

## 2018-02-06 RX ADMIN — LEVOTHYROXINE SODIUM 125 MCG: 125 TABLET ORAL at 06:23

## 2018-02-06 RX ADMIN — IPRATROPIUM BROMIDE AND ALBUTEROL SULFATE 1 AMPULE: .5; 3 SOLUTION RESPIRATORY (INHALATION) at 03:50

## 2018-02-06 RX ADMIN — OSELTAMIVIR PHOSPHATE 75 MG: 75 CAPSULE ORAL at 22:09

## 2018-02-06 RX ADMIN — IPRATROPIUM BROMIDE AND ALBUTEROL SULFATE 1 AMPULE: .5; 3 SOLUTION RESPIRATORY (INHALATION) at 22:34

## 2018-02-06 RX ADMIN — IPRATROPIUM BROMIDE AND ALBUTEROL SULFATE 1 AMPULE: .5; 3 SOLUTION RESPIRATORY (INHALATION) at 16:18

## 2018-02-06 RX ADMIN — GUAIFENESIN 600 MG: 600 TABLET, EXTENDED RELEASE ORAL at 09:33

## 2018-02-06 RX ADMIN — OSELTAMIVIR PHOSPHATE 75 MG: 75 CAPSULE ORAL at 09:33

## 2018-02-06 RX ADMIN — ATORVASTATIN CALCIUM 40 MG: 40 TABLET, FILM COATED ORAL at 17:32

## 2018-02-06 RX ADMIN — DILTIAZEM HYDROCHLORIDE 360 MG: 180 CAPSULE, COATED, EXTENDED RELEASE ORAL at 09:32

## 2018-02-06 RX ADMIN — GUAIFENESIN 600 MG: 600 TABLET, EXTENDED RELEASE ORAL at 22:09

## 2018-02-06 RX ADMIN — POTASSIUM CHLORIDE 40 MEQ: 750 TABLET, FILM COATED, EXTENDED RELEASE ORAL at 09:33

## 2018-02-06 NOTE — H&P
Internal Medicine  History and Physical    Patient:  Herb Betancur  MRN: [id number]      History Obtained From:  patient  PCP: Carmine Mejia MD    CHIEF COMPLAINT:  Cough, nausea, vomiting    HISTORY OF PRESENT ILLNESS:   The patient is a 80 y.o. female who presents with N/V, cough x 3 days. Symptoms started a day after getting influenza vaccine. Associated diarrhea, myalgias, weakness. No CP, no hemoptysis, no orthopnea, no PND. Seen in ED, she was influenza B positive. Past Medical History:        Diagnosis Date    Arthritis     wrist, back, neck, foot    Blood circulation, collateral     Breathing difficulty     Chronic kidney disease     DVT (deep venous thrombosis) ([facility])     History of blood transfusion 1960's    child birth    Hx of blood clots 2013? ??    left leg    Hyperlipidemia     Hypertension     Pneumonia     Prolonged emergence from general anesthesia     with hysterectomy? ?    Thyroid disease     Unspecified diseases of blood and blood-forming organs        Past Surgical History:        Procedure Laterality Date    COLONOSCOPY      last one before 2005???    CYSTOSCOPY  8/18/15    HYDRODISTENTION, RPG, LEFT STENT    HYSTERECTOMY  1983??    SKIN BIOPSY      VARICOSE VEIN SURGERY  1969??    VENA CAVA FILTER PLACEMENT  2013       Medications Prior to Admission:    Prior to Admission medications    Medication Sig Start Date End Date Taking? Authorizing Provider   SF 5000 PLUS 1.1 % CREA Place onto teeth 2 times daily  1/6/18  Yes Historical Provider, MD   warfarin (COUMADIN) 2 MG tablet Take as directed by [facility].  60 tabs = 30 days supply 6/15/17  Yes Nikki Mendoza MD   ondansetron (ZOFRAN) 4 MG tablet Take 4 mg by mouth 4 times daily as needed for Nausea or Vomiting   Yes Historical Provider, MD   levothyroxine (SYNTHROID) 125 MCG tablet Take 125 mcg by mouth Daily Indications: Impaired Thyroid Function VALENTINE   Yes Historical Provider, MD   acetaminophen cooperative, no apparent distress and normal weight  EYES:  extra-ocular muscles intact  ENT:  normocepalic, without obvious abnormality  NECK:  supple, symmetrical, trachea midline  LUNGS:  no increased work of breathing and rhonchi diffuse, diminished breath sounds throughout lungs  CARDIOVASCULAR:  normal S1 and S2 and no edema  ABDOMEN:  No scars, normal bowel sounds, soft, non-distended, non-tender, no masses palpated, no hepatosplenomegally  MUSCULOSKELETAL:  there is no redness, warmth, or swelling of the joints  NEUROLOGIC:  Mental Status Exam:  Level of Alertness:   awake  Orientation:   person, place, time  Memory:   normal  Cranial Nerves:  cranial nerves II-XII are grossly intact  Motor Exam:  Motor exam is symmetrical 5 out of 5 all extremities bilaterally  SKIN:  no bruising or bleeding and normal skin color, texture, turgor      CBC:   Recent Labs      02/04/18   1533   WBC  7.1   HGB  13.5   PLT  152     BMP:    Recent Labs      02/04/18   1533  02/05/18   0519   NA  136  141   K  3.6  3.2*   CL  94*  98   CO2  26  27   BUN  19  16   CREATININE  0.8  0.6   GLUCOSE  144*  93     Hepatic:   Recent Labs      02/04/18   1533   AST  134*   ALT  86*   BILITOT  0.4   ALKPHOS  86     INR:   Recent Labs      02/04/18   1534  02/05/18   0519   INR  1.58*  1.72*      Ref. Range 2/4/2018 15:19   Flu A Antigen Latest Ref Range: NEGATIVE  NEGATIVE   Flu B Antigen Latest Ref Range: NEGATIVE  POSITIVE (A)     -----------------------------------------------------------------  PA/lat CXR:      The heart size is normal.  The mediastinum is not widened.  There are no pulmonary infiltrates or effusions.  The pulmonary vascularity is normal. An IVC filter is present in the upper abdomen. No suspicious osseous lesions are present. Degenerative    changes are noted with minimal retrolisthesis at the lower thoracic spine. EKG: NSR, 74 bpm, LAD, NI    Assessment and Plan   1.  Influenza B virus

## 2018-02-07 VITALS
TEMPERATURE: 98 F | BODY MASS INDEX: 27.14 KG/M2 | HEART RATE: 88 BPM | RESPIRATION RATE: 18 BRPM | DIASTOLIC BLOOD PRESSURE: 78 MMHG | OXYGEN SATURATION: 95 % | HEIGHT: 66 IN | SYSTOLIC BLOOD PRESSURE: 130 MMHG | WEIGHT: 168.9 LBS

## 2018-02-07 PROBLEM — E03.9 ACQUIRED HYPOTHYROIDISM: Status: ACTIVE | Noted: 2018-02-07

## 2018-02-07 LAB
ALBUMIN SERPL-MCNC: 3.4 G/DL (ref 3.5–5.1)
ALP BLD-CCNC: 70 U/L (ref 38–126)
ALT SERPL-CCNC: 48 U/L (ref 11–66)
AST SERPL-CCNC: 55 U/L (ref 5–40)
BILIRUB SERPL-MCNC: 0.5 MG/DL (ref 0.3–1.2)
BILIRUBIN DIRECT: < 0.2 MG/DL (ref 0–0.3)
INR BLD: 2.7 (ref 0.85–1.13)
TOTAL PROTEIN: 6.6 G/DL (ref 6.1–8)

## 2018-02-07 PROCEDURE — 6370000000 HC RX 637 (ALT 250 FOR IP): Performed by: INTERNAL MEDICINE

## 2018-02-07 PROCEDURE — 2580000003 HC RX 258: Performed by: INTERNAL MEDICINE

## 2018-02-07 PROCEDURE — 80076 HEPATIC FUNCTION PANEL: CPT

## 2018-02-07 PROCEDURE — 85610 PROTHROMBIN TIME: CPT

## 2018-02-07 PROCEDURE — 36415 COLL VENOUS BLD VENIPUNCTURE: CPT

## 2018-02-07 RX ORDER — ALBUTEROL SULFATE 90 UG/1
2 AEROSOL, METERED RESPIRATORY (INHALATION) EVERY 6 HOURS PRN
Qty: 1 INHALER | Refills: 3 | Status: SHIPPED | OUTPATIENT
Start: 2018-02-07 | End: 2019-09-10

## 2018-02-07 RX ORDER — OSELTAMIVIR PHOSPHATE 75 MG/1
75 CAPSULE ORAL 2 TIMES DAILY
Qty: 4 CAPSULE | Refills: 0 | Status: SHIPPED | OUTPATIENT
Start: 2018-02-04 | End: 2018-02-27 | Stop reason: ALTCHOICE

## 2018-02-07 RX ORDER — WARFARIN SODIUM 2 MG/1
2 TABLET ORAL
Status: DISCONTINUED | OUTPATIENT
Start: 2018-02-07 | End: 2018-02-07 | Stop reason: HOSPADM

## 2018-02-07 RX ADMIN — SODIUM CHLORIDE: 9 INJECTION, SOLUTION INTRAVENOUS at 01:30

## 2018-02-07 RX ADMIN — DILTIAZEM HYDROCHLORIDE 360 MG: 180 CAPSULE, COATED, EXTENDED RELEASE ORAL at 08:34

## 2018-02-07 RX ADMIN — OSELTAMIVIR PHOSPHATE 75 MG: 75 CAPSULE ORAL at 08:34

## 2018-02-07 RX ADMIN — GUAIFENESIN 600 MG: 600 TABLET, EXTENDED RELEASE ORAL at 08:34

## 2018-02-07 RX ADMIN — LEVOTHYROXINE SODIUM 125 MCG: 125 TABLET ORAL at 05:36

## 2018-02-07 NOTE — DISCHARGE INSTR - DIET

## 2018-02-07 NOTE — PROGRESS NOTES
INTERNAL MEDICINE Progress Note  2/7/2018 2:01 PM  Subjective:   Admit Date: 2/4/2018  PCP: Stephanie Hinson MD  Interval History:   Feels better, cough    Objective:   Vitals: /78   Pulse 88   Temp 98 °F (36.7 °C) (Oral)   Resp 18   Ht 5' 6\" (1.676 m)   Wt 168 lb 14.4 oz (76.6 kg)   SpO2 95%   BMI 27.26 kg/m²   General appearance: alert and cooperative with exam  HEENT: atraumatic  Neck:  no JVD  Lungs: CTA lisa  Heart: S1, S2 normal  Abdomen: soft, non-tender; bowel sounds normal; no masses,  no organomegaly  Extremities: no edema,   Neurologic:  Alert, oriented, thought content appropriate      Medications:   Scheduled Meds:   warfarin  2 mg Oral Once    guaiFENesin  600 mg Oral BID    potassium replacement protocol   Other RX Placeholder    atorvastatin  40 mg Oral QPM    diltiazem  360 mg Oral Daily    levothyroxine  125 mcg Oral Daily    sodium chloride flush  10 mL Intravenous 2 times per day    oseltamivir  75 mg Oral BID    warfarin (COUMADIN) daily dosing (placeholder)   Other RX Placeholder     Continuous Infusions:   sodium chloride 75 mL/hr at 02/07/18 0130       Lab Results:   CBC:   Recent Labs      02/04/18   1533   WBC  7.1   HGB  13.5   PLT  152     BMP:    Recent Labs      02/04/18   1533  02/05/18   0519  02/06/18   0606   NA  136  141  139   K  3.6  3.2*  3.5   CL  94*  98  99   CO2  26  27  28   BUN  19  16  11   CREATININE  0.8  0.6  0.5   GLUCOSE  144*  93  89     Hepatic:   Recent Labs      02/04/18   1533   AST  134*   ALT  86*   BILITOT  0.4   ALKPHOS  86     INR:   Recent Labs      02/05/18   0519  02/06/18   0606  02/07/18   0617   INR  1.72*  2.33*  2.70*     Magnesium:    Lab Results   Component Value Date    MG 1.9 02/05/2018     TSH:    Lab Results   Component Value Date    TSH 0.301 02/06/2018     CXR:  No acute cardiopulmonary disease. COPD. Borderline cardiomegaly. Assessment and Plan:   1. Influenza B virus infecrtion  2.  Acute resp failure with hypoxemia  3. dehydration  4. Hypokalemia, resolved  5. Hypothyroidism. 6. VTE, on anticoag     Stable for dc home. Tamiflu. Replaced K. Cont anticoags.         Diego Abdul MD

## 2018-02-07 NOTE — CARE COORDINATION
2/7/18, 2:52 PM    Discharge plan discussed by  and . Discharge plan reviewed with patient/ family. Patient/ family verbalize understanding of discharge plan and are in agreement with plan. Understanding was demonstrated using the teach back method. IMM Letter  IMM Letter date given[de-identified] 02/07/18  IMM Letter time given[de-identified] 0830       Plans to return home with no new services.
02/05/18  Follow Up Appointment: Best Day/ Time:      Discharge Plan: Spoke with pt today as she was resting in chair with IV fluids infusing. Ready to take breakfast. From home alone with no services or DME's. She has a daughter who's home is just behind hers. She drives, has a PCP and no issues getting meds. She does not expect to have discharge needs.       Evaluation: no

## 2018-02-07 NOTE — PLAN OF CARE
Problem: Pain Control  Goal: Maintain pain level at or below patient's acceptable level (or 5 if patient is unable to determine acceptable level)  Outcome: Met This Shift  Pt is currently denying having pain at current time. Problem: Cardiovascular  Goal: No DVT, peripheral vascular complications  Outcome: Met This Shift  Pt has no signs of DVT currently  Goal: Anticoagulate/Hct stable  Outcome: Met This Shift  Pt is currently on Coumadin daily    Problem: Respiratory  Goal: O2 Sat > 90%  Outcome: Met This Shift  Pt remains on oxygen at 2l nc to keep her 02 sat level above 92%    Problem: Safety:  Goal: Free from accidental physical injury  Free from accidental physical injury   Outcome: Met This Shift  Patient free from injury/harm this shift. Complying well with medical devices and following safety precautions. Fall risk continues to be assessed. Fall precautions in place, 5 P's used to provide safe environment. Bed in low and locked position, call light in reach, side rails upx2-3. Needs being met. Continuing to monitor. Problem: Discharge Planning:  Goal: Patients continuum of care needs are met  Patients continuum of care needs are met   Outcome: Met This Shift  Pt will be discharged to home    Problem: Falls - Risk of  Goal: Absence of falls  Outcome: Met This Shift  Patient free from falls this shift. Patient fall risk r/t. Continues on falling star program, siderails upx2-3, bed alarm on, call light in reach, bed in low and locked position. Hourly checks preformed, potty, position, pain, pathway and possessions assessed. Continuing to monitor. Comments: Care plan reviewed with patient. Patient does verbalize understanding of the plan of care and contribute to goal setting.
Problem: Pain Control  Goal: Maintain pain level at or below patient's acceptable level (or 5 if patient is unable to determine acceptable level)  Outcome: Ongoing  Pain goal of 0 met this shift, patient has denied pain    Problem: Cardiovascular  Goal: No DVT, peripheral vascular complications  Outcome: Ongoing  Receiving lovenox and coumadin, denies any calf pain/tenderness    Problem: Respiratory  Goal: No pulmonary complications  Outcome: Ongoing  Expiratory wheezes heard throughout lungs, oxygen saturations 95% on 2.5 L NC, frequent congested cough with green-yellow sputum    Problem:   Goal: Adequate urinary output  Outcome: Ongoing  Voiding spontaneously clear yellow urine greater than 30 ml/hr    Problem: Safety:  Goal: Free from accidental physical injury  Free from accidental physical injury   Outcome: Ongoing  No falls this shift, patient alert and oriented, gait steady, up with one assist    Problem: Discharge Planning:  Goal: Patients continuum of care needs are met  Patients continuum of care needs are met   Outcome: Ongoing  Patient from home alone    Comments: Care plan reviewed with patient. Patient verbalizes understanding of the plan of care and contributes to goal setting.
Problem: Pain Control  Goal: Maintain pain level at or below patient's acceptable level (or 5 if patient is unable to determine acceptable level)  Outcome: Ongoing  Pain goal of 0 met this shift, patient has denied pain     Problem: Cardiovascular  Goal: No DVT, peripheral vascular complications  Outcome: Ongoing  Receiving lovenox and coumadin, denies any calf pain/tenderness     Problem: Respiratory  Goal: No pulmonary complications  Outcome: Ongoing  Expiratory wheezes heard throughout lungs, oxygen saturations 95% on 2 L NC, frequent congested cough with green-yellow sputum     Problem:   Goal: Adequate urinary output  Outcome: Ongoing  Voiding spontaneously clear yellow urine greater than 30 ml/hr     Problem: Safety:  Goal: Free from accidental physical injury  Free from accidental physical injury   Outcome: Ongoing  No falls this shift, patient alert and oriented, gait steady, up with one assist, uses call light appropriately     Problem: Discharge Planning:  Goal: Patients continuum of care needs are met  Patients continuum of care needs are met   Outcome: Ongoing  Patient from home alone     Comments: Care plan reviewed with patient.   Patient verbalizes understanding of the plan of care and contributes to goal setting.     
feels some better today, but still weak at times. Problem: Nutrition  Goal: Optimal nutrition therapy  Outcome: Met This Shift  Appears to have increased appetite and eating better today. Ate 50-75% of general diet meal without difficulty    Problem: Skin Integrity/Risk  Goal: No skin breakdown during hospitalization  Outcome: Met This Shift  No skin issues noted. No redness or open areas noted. Pt ambulatory as well. Problem: PROTECTIVE PRECAUTIONS  Intervention: Isolation precautions   Isolation continues for influenza B. Gowns, gloves and masks worn while in room. Comments: Care plan reviewed with patient. Patient erbalize understanding of the plan of care and contribute to goal setting.

## 2018-02-07 NOTE — PROGRESS NOTES
Clinical Pharmacy Note                                               Warfarin Discharge Recommendations      Pt discharged from [facility] today after admission for influenza    INR today:  Recent Labs      02/07/18   0617   INR  2.70*       Coumadin 2 mg tabs    Interacting medications at discharge: Tamiflu, levothyroxine    INR goal during admission:2-3    Recommendations for discharge:   Date Warfarin Dose   2/8/18 4 mg   2/9/18 INR       Provider dosing warfarin:    Recheck INR:  2/9/18 at 9:20 AM with Adamaris Stone

## 2018-02-07 NOTE — DISCHARGE SUMMARY
none    Significant Diagnostic Studies: labs: CBC:   Recent Labs      02/04/18   1533   WBC  7.1   HGB  13.5   PLT  152     BMP:    Recent Labs      02/04/18   1533  02/05/18   0519  02/06/18   0606   NA  136  141  139   K  3.6  3.2*  3.5   CL  94*  98  99   CO2  26  27  28   BUN  19  16  11   CREATININE  0.8  0.6  0.5   GLUCOSE  144*  93  89     Hepatic:   Recent Labs      02/04/18   1533   AST  134*   ALT  86*   BILITOT  0.4   ALKPHOS  86     INR:   Recent Labs      02/05/18   0519  02/06/18   0606  02/07/18   0617   INR  1.72*  2.33*  2.70*     Magnesium:    Lab Results   Component Value Date    MG 1.9 02/05/2018     TSH:    Lab Results   Component Value Date    TSH 0.301 02/06/2018     CXR:  No acute cardiopulmonary disease. COPD. Borderline cardiomegaly. Assessment and Plan:   7. Influenza B virus infection  8. Acute resp failure with hypoxemia  9. dehydration  10. Hypokalemia, resolved  11. Hypothyroidism. 12. VTE, on anticoag     Stable for dc home. Tamiflu. Replaced K. Cont anticoags. Treatments:  IV hydration, Tamiflu    Disposition:   Home.     Signed:  Vladimir Madrigal  2/7/2018, 2:10 PM

## 2018-02-09 ENCOUNTER — HOSPITAL ENCOUNTER (OUTPATIENT)
Dept: PHARMACY | Age: 83
Setting detail: THERAPIES SERIES
Discharge: HOME | End: 2018-02-09
Payer: MEDICARE

## 2018-02-09 DIAGNOSIS — I26.99 OTHER PULMONARY EMBOLISM WITHOUT ACUTE COR PULMONALE, UNSPECIFIED CHRONICITY (HCC): ICD-10-CM

## 2018-02-09 LAB
HCT VFR BLD CALC: 40.9 % (ref 35–44)
HEMOGLOBIN: 14 GM/DL (ref 12–15)
POC INR: 2.2 (ref 0.8–1.2)

## 2018-02-09 PROCEDURE — 99211 OFF/OP EST MAY X REQ PHY/QHP: CPT | Performed by: PHARMACIST

## 2018-02-09 PROCEDURE — 85610 PROTHROMBIN TIME: CPT | Performed by: PHARMACIST

## 2018-02-09 PROCEDURE — 36416 COLLJ CAPILLARY BLOOD SPEC: CPT | Performed by: PHARMACIST

## 2018-02-10 LAB
BLOOD CULTURE, ROUTINE: NORMAL
BLOOD CULTURE, ROUTINE: NORMAL

## 2018-02-27 ENCOUNTER — HOSPITAL ENCOUNTER (OUTPATIENT)
Dept: PHARMACY | Age: 83
Setting detail: THERAPIES SERIES
Discharge: HOME | End: 2018-02-27
Payer: MEDICARE

## 2018-02-27 DIAGNOSIS — I26.99 OTHER PULMONARY EMBOLISM WITHOUT ACUTE COR PULMONALE, UNSPECIFIED CHRONICITY (HCC): ICD-10-CM

## 2018-02-27 LAB — POC INR: 3 (ref 0.8–1.2)

## 2018-02-27 PROCEDURE — 36416 COLLJ CAPILLARY BLOOD SPEC: CPT

## 2018-02-27 PROCEDURE — 99211 OFF/OP EST MAY X REQ PHY/QHP: CPT

## 2018-02-27 PROCEDURE — 85610 PROTHROMBIN TIME: CPT

## 2018-03-20 ENCOUNTER — HOSPITAL ENCOUNTER (OUTPATIENT)
Dept: PHARMACY | Age: 83
Setting detail: THERAPIES SERIES
Discharge: HOME | End: 2018-03-20
Payer: MEDICARE

## 2018-03-20 DIAGNOSIS — I26.99 OTHER PULMONARY EMBOLISM WITHOUT ACUTE COR PULMONALE, UNSPECIFIED CHRONICITY (HCC): ICD-10-CM

## 2018-03-20 LAB — POC INR: 3.2 (ref 0.8–1.2)

## 2018-03-20 PROCEDURE — 85610 PROTHROMBIN TIME: CPT

## 2018-03-20 PROCEDURE — 99211 OFF/OP EST MAY X REQ PHY/QHP: CPT

## 2018-03-20 PROCEDURE — 36416 COLLJ CAPILLARY BLOOD SPEC: CPT

## 2018-04-03 ENCOUNTER — HOSPITAL ENCOUNTER (OUTPATIENT)
Dept: INTERVENTIONAL RADIOLOGY/VASCULAR | Age: 83
Discharge: HOME | End: 2018-04-03
Payer: MEDICARE

## 2018-04-03 ENCOUNTER — HOSPITAL ENCOUNTER (OUTPATIENT)
Age: 83
Discharge: HOME | End: 2018-04-03
Payer: MEDICARE

## 2018-04-03 ENCOUNTER — HOSPITAL ENCOUNTER (OUTPATIENT)
Dept: GENERAL RADIOLOGY | Age: 83
Discharge: HOME | End: 2018-04-03
Payer: MEDICARE

## 2018-04-03 DIAGNOSIS — M79.662 PAIN AND SWELLING OF LEFT LOWER LEG: ICD-10-CM

## 2018-04-03 DIAGNOSIS — M25.562 LEFT KNEE PAIN, UNSPECIFIED CHRONICITY: ICD-10-CM

## 2018-04-03 DIAGNOSIS — M79.89 PAIN AND SWELLING OF LEFT LOWER LEG: ICD-10-CM
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
## 2018-04-03 LAB — INR BLD: 1.83 (ref 0.85–1.13)

## 2018-04-03 PROCEDURE — 36415 COLL VENOUS BLD VENIPUNCTURE: CPT

## 2018-04-03 PROCEDURE — 73564 X-RAY EXAM KNEE 4 OR MORE: CPT

## 2018-04-03 PROCEDURE — 93971 EXTREMITY STUDY: CPT

## 2018-04-03 PROCEDURE — 85610 PROTHROMBIN TIME: CPT

## 2018-04-10 ENCOUNTER — HOSPITAL ENCOUNTER (OUTPATIENT)
Dept: PHARMACY | Age: 83
Setting detail: THERAPIES SERIES
Discharge: HOME | End: 2018-04-10
Payer: MEDICARE

## 2018-04-10 DIAGNOSIS — I26.99 OTHER PULMONARY EMBOLISM WITHOUT ACUTE COR PULMONALE, UNSPECIFIED CHRONICITY (HCC): ICD-10-CM

## 2018-04-10 LAB
POC INR: 2.2 (ref 0.8–1.2)
POC INR: NORMAL (ref 0.8–1.2)

## 2018-04-10 PROCEDURE — 36416 COLLJ CAPILLARY BLOOD SPEC: CPT

## 2018-04-10 PROCEDURE — 99211 OFF/OP EST MAY X REQ PHY/QHP: CPT

## 2018-04-10 PROCEDURE — 85610 PROTHROMBIN TIME: CPT

## 2018-05-14 ENCOUNTER — HOSPITAL ENCOUNTER (OUTPATIENT)
Dept: PHARMACY | Age: 83
Setting detail: THERAPIES SERIES
Discharge: HOME | End: 2018-05-14
Payer: MEDICARE

## 2018-05-14 DIAGNOSIS — I26.99 OTHER PULMONARY EMBOLISM WITHOUT ACUTE COR PULMONALE, UNSPECIFIED CHRONICITY (HCC): ICD-10-CM

## 2018-05-14 LAB — POC INR: 2.6 (ref 0.8–1.2)

## 2018-05-14 PROCEDURE — 85610 PROTHROMBIN TIME: CPT

## 2018-05-14 PROCEDURE — 99211 OFF/OP EST MAY X REQ PHY/QHP: CPT

## 2018-05-14 PROCEDURE — 36416 COLLJ CAPILLARY BLOOD SPEC: CPT

## 2018-06-11 ENCOUNTER — HOSPITAL ENCOUNTER (OUTPATIENT)
Dept: PHARMACY | Age: 83
Setting detail: THERAPIES SERIES
Discharge: HOME | End: 2018-06-11
Payer: MEDICARE

## 2018-06-11 DIAGNOSIS — I26.99 OTHER PULMONARY EMBOLISM WITHOUT ACUTE COR PULMONALE, UNSPECIFIED CHRONICITY (HCC): ICD-10-CM

## 2018-06-11 LAB
HCT VFR BLD CALC: 41.3 % (ref 37–47)
HEMOGLOBIN: 13.8 GM/DL (ref 12–16)
INR BLD: 4.47 (ref 0.85–1.13)

## 2018-06-11 PROCEDURE — 85014 HEMATOCRIT: CPT

## 2018-06-11 PROCEDURE — 85610 PROTHROMBIN TIME: CPT

## 2018-06-11 PROCEDURE — 85018 HEMOGLOBIN: CPT

## 2018-06-11 PROCEDURE — 99211 OFF/OP EST MAY X REQ PHY/QHP: CPT

## 2018-06-11 PROCEDURE — 36416 COLLJ CAPILLARY BLOOD SPEC: CPT

## 2018-06-14 ENCOUNTER — TELEPHONE (OUTPATIENT)
Dept: PHARMACY | Age: 83
End: 2018-06-14

## 2018-06-18 ENCOUNTER — HOSPITAL ENCOUNTER (OUTPATIENT)
Dept: PHARMACY | Age: 83
Setting detail: THERAPIES SERIES
Discharge: HOME | End: 2018-06-18
Payer: MEDICARE

## 2018-06-18 DIAGNOSIS — I26.99 OTHER PULMONARY EMBOLISM WITHOUT ACUTE COR PULMONALE, UNSPECIFIED CHRONICITY (HCC): ICD-10-CM

## 2018-06-18 LAB — POC INR: 1.5 (ref 0.8–1.2)

## 2018-06-18 PROCEDURE — 85610 PROTHROMBIN TIME: CPT

## 2018-06-18 PROCEDURE — 36416 COLLJ CAPILLARY BLOOD SPEC: CPT

## 2018-06-18 PROCEDURE — 99211 OFF/OP EST MAY X REQ PHY/QHP: CPT

## 2018-06-18 RX ORDER — CIPROFLOXACIN 500 MG/1
500 TABLET, FILM COATED ORAL 2 TIMES DAILY
COMMUNITY
Start: 2018-06-14 | End: 2018-06-24

## 2018-06-26 ENCOUNTER — HOSPITAL ENCOUNTER (OUTPATIENT)
Dept: PHARMACY | Age: 83
Setting detail: THERAPIES SERIES
Discharge: HOME | End: 2018-06-26
Payer: MEDICARE

## 2018-06-26 DIAGNOSIS — I26.99 OTHER PULMONARY EMBOLISM WITHOUT ACUTE COR PULMONALE, UNSPECIFIED CHRONICITY (HCC): ICD-10-CM

## 2018-06-26 LAB — POC INR: 1.4 (ref 0.8–1.2)

## 2018-06-26 PROCEDURE — 99211 OFF/OP EST MAY X REQ PHY/QHP: CPT

## 2018-06-26 PROCEDURE — 99212 OFFICE O/P EST SF 10 MIN: CPT

## 2018-06-26 PROCEDURE — 36416 COLLJ CAPILLARY BLOOD SPEC: CPT

## 2018-06-26 PROCEDURE — 85610 PROTHROMBIN TIME: CPT

## 2018-06-26 RX ORDER — WARFARIN SODIUM 2 MG/1
TABLET ORAL
Qty: 60 TABLET | Refills: 11 | Status: SHIPPED | OUTPATIENT
Start: 2018-06-26 | End: 2019-06-04 | Stop reason: SDUPTHER

## 2018-07-06 ENCOUNTER — HOSPITAL ENCOUNTER (OUTPATIENT)
Dept: PHARMACY | Age: 83
Setting detail: THERAPIES SERIES
Discharge: HOME | End: 2018-07-06
Payer: MEDICARE

## 2018-07-06 DIAGNOSIS — I26.99 OTHER PULMONARY EMBOLISM WITHOUT ACUTE COR PULMONALE, UNSPECIFIED CHRONICITY (HCC): ICD-10-CM

## 2018-07-06 LAB — POC INR: 1.9 (ref 0.8–1.2)

## 2018-07-06 PROCEDURE — 85610 PROTHROMBIN TIME: CPT | Performed by: PHARMACIST

## 2018-07-06 PROCEDURE — 36416 COLLJ CAPILLARY BLOOD SPEC: CPT | Performed by: PHARMACIST

## 2018-07-06 PROCEDURE — 99211 OFF/OP EST MAY X REQ PHY/QHP: CPT | Performed by: PHARMACIST

## 2018-07-06 NOTE — PROGRESS NOTES
Medication Management [facility]  Anticoagulation Clinic  [phone] (phone)  [phone] (fax)      Ms. Sg Moulton is a 80 y.o.  female with history of PE who presents today for anticoagulation monitoring and adjustment. Patient verifies current dosing regimen and tablet strength. No missed or extra doses. Patient denies s/s bleeding/bruising/swelling/SOB/chest pain. Patient reports one day of SOB in the morning. No blood in urine or stool. No dietary changes. No changes in medication/OTC agents/Herbals. No change in alcohol use or tobacco use. No change in activity level. Patient denies headaches/dizziness/lightheadedness/falls. No vomiting/diarrhea or acute illness. No Procedures scheduled in the future at this time. Assessment:   Lab Results   Component Value Date    INR 1.90 (H) 07/06/2018    INR 1.40 (H) 06/26/2018    INR 1.50 (H) 06/18/2018     INR subtherapeutic   Recent Labs      07/06/18   1441   INR  1.90*       Plan:  Increase Coumadin dose to 4 mg by mouth everyday. Recheck INR 2 weeks. Patient reminded to call the Anticoagulation Clinic with signs or symptoms of bleeding or with any medication changes. Patient given instructions utilizing the teach back method. Discharged ambulatory in no apparent distress. After visit summary printed and reviewed with patient.       Medications reviewed and updated on home medication list Yes    Influenza vaccine:     [] given    [x] declined   [x] received previously   [] plans to receive at a later time   [] refused    [x] documented in EPIC

## 2018-07-20 ENCOUNTER — HOSPITAL ENCOUNTER (OUTPATIENT)
Dept: PHARMACY | Age: 83
Setting detail: THERAPIES SERIES
Discharge: HOME | End: 2018-07-20
Payer: MEDICARE

## 2018-07-20 DIAGNOSIS — I26.99 OTHER PULMONARY EMBOLISM WITHOUT ACUTE COR PULMONALE, UNSPECIFIED CHRONICITY (HCC): ICD-10-CM

## 2018-07-20 LAB — POC INR: 2.3 (ref 0.8–1.2)

## 2018-07-20 PROCEDURE — 36416 COLLJ CAPILLARY BLOOD SPEC: CPT | Performed by: PHARMACIST

## 2018-07-20 PROCEDURE — 99211 OFF/OP EST MAY X REQ PHY/QHP: CPT | Performed by: PHARMACIST

## 2018-07-20 PROCEDURE — 85610 PROTHROMBIN TIME: CPT | Performed by: PHARMACIST

## 2018-07-27 ENCOUNTER — TELEPHONE (OUTPATIENT)
Dept: PHARMACY | Age: 83
End: 2018-07-27

## 2018-08-15 ENCOUNTER — HOSPITAL ENCOUNTER (OUTPATIENT)
Dept: PHARMACY | Age: 83
Setting detail: THERAPIES SERIES
Discharge: HOME | End: 2018-08-15
Payer: MEDICARE

## 2018-08-15 DIAGNOSIS — I26.99 OTHER PULMONARY EMBOLISM WITHOUT ACUTE COR PULMONALE, UNSPECIFIED CHRONICITY (HCC): ICD-10-CM

## 2018-08-15 LAB — POC INR: 1.3 (ref 0.8–1.2)

## 2018-08-15 PROCEDURE — 85610 PROTHROMBIN TIME: CPT

## 2018-08-15 PROCEDURE — 99211 OFF/OP EST MAY X REQ PHY/QHP: CPT

## 2018-08-15 PROCEDURE — 36416 COLLJ CAPILLARY BLOOD SPEC: CPT

## 2018-08-27 LAB
AVERAGE GLUCOSE: 131
BASOPHILS ABSOLUTE: NORMAL /ΜL
BASOPHILS RELATIVE PERCENT: NORMAL %
EOSINOPHILS ABSOLUTE: NORMAL /ΜL
EOSINOPHILS RELATIVE PERCENT: NORMAL %
HBA1C MFR BLD: 6.2 %
HCT VFR BLD CALC: 42.2 % (ref 36–46)
HEMOGLOBIN: 13.9 G/DL (ref 12–16)
LYMPHOCYTES ABSOLUTE: NORMAL /ΜL
LYMPHOCYTES RELATIVE PERCENT: NORMAL %
MCH RBC QN AUTO: NORMAL PG
MCHC RBC AUTO-ENTMCNC: NORMAL G/DL
MCV RBC AUTO: NORMAL FL
MONOCYTES ABSOLUTE: NORMAL /ΜL
MONOCYTES RELATIVE PERCENT: NORMAL %
NEUTROPHILS ABSOLUTE: NORMAL /ΜL
NEUTROPHILS RELATIVE PERCENT: NORMAL %
PLATELET # BLD: 178 K/ΜL
PMV BLD AUTO: NORMAL FL
RBC # BLD: 4.78 10^6/ΜL
WBC # BLD: 9.8 10^3/ML

## 2018-08-29 ENCOUNTER — HOSPITAL ENCOUNTER (OUTPATIENT)
Dept: PHARMACY | Age: 83
Setting detail: THERAPIES SERIES
Discharge: HOME | End: 2018-08-29
Payer: MEDICARE

## 2018-08-29 LAB — POC INR: 2.4 (ref 0.8–1.2)

## 2018-08-29 PROCEDURE — 99211 OFF/OP EST MAY X REQ PHY/QHP: CPT

## 2018-08-29 PROCEDURE — 36416 COLLJ CAPILLARY BLOOD SPEC: CPT

## 2018-08-29 PROCEDURE — 85610 PROTHROMBIN TIME: CPT

## 2018-09-19 ENCOUNTER — HOSPITAL ENCOUNTER (OUTPATIENT)
Dept: PHARMACY | Age: 83
Setting detail: THERAPIES SERIES
Discharge: HOME | End: 2018-09-19
Payer: MEDICARE

## 2018-09-19 DIAGNOSIS — I26.99 OTHER PULMONARY EMBOLISM WITHOUT ACUTE COR PULMONALE, UNSPECIFIED CHRONICITY (HCC): ICD-10-CM

## 2018-09-19 LAB — POC INR: 2.8 (ref 0.8–1.2)

## 2018-09-19 PROCEDURE — 85610 PROTHROMBIN TIME: CPT

## 2018-09-19 PROCEDURE — 36416 COLLJ CAPILLARY BLOOD SPEC: CPT

## 2018-09-19 PROCEDURE — 99211 OFF/OP EST MAY X REQ PHY/QHP: CPT

## 2018-10-17 ENCOUNTER — HOSPITAL ENCOUNTER (OUTPATIENT)
Dept: PHARMACY | Age: 83
Setting detail: THERAPIES SERIES
Discharge: HOME | End: 2018-10-17
Payer: MEDICARE

## 2018-10-17 DIAGNOSIS — I26.99 OTHER PULMONARY EMBOLISM WITHOUT ACUTE COR PULMONALE, UNSPECIFIED CHRONICITY (HCC): ICD-10-CM

## 2018-10-17 LAB — POC INR: 2.7 (ref 0.8–1.2)

## 2018-10-17 PROCEDURE — 36416 COLLJ CAPILLARY BLOOD SPEC: CPT

## 2018-10-17 PROCEDURE — 85610 PROTHROMBIN TIME: CPT

## 2018-10-17 PROCEDURE — 99211 OFF/OP EST MAY X REQ PHY/QHP: CPT

## 2018-11-13 ENCOUNTER — HOSPITAL ENCOUNTER (OUTPATIENT)
Dept: PHARMACY | Age: 83
Setting detail: THERAPIES SERIES
Discharge: HOME | End: 2018-11-13
Payer: MEDICARE

## 2018-11-13 DIAGNOSIS — I26.99 OTHER PULMONARY EMBOLISM WITHOUT ACUTE COR PULMONALE, UNSPECIFIED CHRONICITY (HCC): ICD-10-CM

## 2018-11-13 DIAGNOSIS — Z95.828 PRESENCE OF IVC FILTER: ICD-10-CM

## 2018-11-13 LAB — POC INR: 2.5 (ref 0.8–1.2)

## 2018-11-13 PROCEDURE — 36416 COLLJ CAPILLARY BLOOD SPEC: CPT

## 2018-11-13 PROCEDURE — 99211 OFF/OP EST MAY X REQ PHY/QHP: CPT

## 2018-11-13 PROCEDURE — 85610 PROTHROMBIN TIME: CPT

## 2018-12-10 DIAGNOSIS — Z86.718 HX OF BLOOD CLOTS: Primary | ICD-10-CM

## 2018-12-11 ENCOUNTER — HOSPITAL ENCOUNTER (OUTPATIENT)
Dept: PHARMACY | Age: 83
Setting detail: THERAPIES SERIES
Discharge: HOME | End: 2018-12-11
Payer: MEDICARE

## 2018-12-11 DIAGNOSIS — I26.99 OTHER PULMONARY EMBOLISM WITHOUT ACUTE COR PULMONALE, UNSPECIFIED CHRONICITY (HCC): ICD-10-CM

## 2018-12-11 LAB — POC INR: 3.5 (ref 0.8–1.2)

## 2018-12-11 PROCEDURE — 36416 COLLJ CAPILLARY BLOOD SPEC: CPT

## 2018-12-11 PROCEDURE — 85610 PROTHROMBIN TIME: CPT

## 2018-12-11 PROCEDURE — 99211 OFF/OP EST MAY X REQ PHY/QHP: CPT

## 2018-12-11 NOTE — PROGRESS NOTES
Medication Management [facility]  Anticoagulation Clinic  [phone] (phone)  [phone] (fax)      Ms. Coretta Canavan is a 80 y.o.  female with history of PE, per Dr. Micah Sharp referral, who presents today for Warfarin monitoring and adjustment (4 week visit). Patient verifies current dosing regimen and tablet strength. No missed or extra doses. Patient denies bruising/swelling/chest pain. Gets SOB when tired. Gets a little blood on tissue after blowing nose; reminded of need to keep membrane moist and how to do so. Wears support hose. No blood in urine or stool. Dietary changes: had cranberry relish 11/22 and 11/23. Also had more vegetables than usual 11/22. No changes in medication/OTC agents/herbals. No change in alcohol use or tobacco use. No change in activity level. Patient denies headaches/dizziness/lightheadedness/falls. No vomiting/diarrhea or acute illness. No procedures scheduled in the future at this time. Assessment:   Lab Results   Component Value Date    INR 3.50 (H) 12/11/2018    INR 2.50 (H) 11/13/2018    INR 2.70 (H) 10/17/2018     INR supratherapeutic - goal 2-3. Recent Labs      12/11/18   1459   INR  3.50*     Plan:  POCT INR ordered/performed/result reviewed. Hold today, 2 mg tomorrow, then continue Coumadin 4 mg daily PO. Recheck INR in 2 weeks. (Report given - orders entered by K. Severa Norse., PharmD.)  Patient reminded to call the Anticoagulation Clinic with any signs or symptoms of bleeding or with any medication changes. Patient given instructions utilizing the teach back method. Discharged ambulatory in no apparent distress. After visit summary printed and reviewed with patient.       Medications reviewed and updated on home medication list    Influenza vaccine: unsure if will take it this year.    [] given    [] declined   [] received previously   [] plans to receive at a later time   [] refused    [] documented in [facility]

## 2018-12-27 ENCOUNTER — HOSPITAL ENCOUNTER (OUTPATIENT)
Dept: PHARMACY | Age: 83
Setting detail: THERAPIES SERIES
Discharge: HOME | End: 2018-12-27
Payer: MEDICARE

## 2018-12-27 ENCOUNTER — APPOINTMENT (OUTPATIENT)
Dept: PHARMACY | Age: 83
End: 2018-12-27
Payer: MEDICARE

## 2018-12-27 DIAGNOSIS — I26.99 OTHER PULMONARY EMBOLISM WITHOUT ACUTE COR PULMONALE, UNSPECIFIED CHRONICITY (HCC): ICD-10-CM

## 2018-12-27 LAB — POC INR: 2.1 (ref 0.8–1.2)

## 2018-12-27 PROCEDURE — 99211 OFF/OP EST MAY X REQ PHY/QHP: CPT

## 2018-12-27 PROCEDURE — 85610 PROTHROMBIN TIME: CPT

## 2018-12-27 PROCEDURE — 36416 COLLJ CAPILLARY BLOOD SPEC: CPT

## 2019-01-10 ENCOUNTER — HOSPITAL ENCOUNTER (OUTPATIENT)
Dept: WOMENS IMAGING | Age: 84
Discharge: HOME | End: 2019-01-10
Payer: MEDICARE

## 2019-01-10 DIAGNOSIS — Z12.31 VISIT FOR SCREENING MAMMOGRAM: ICD-10-CM

## 2019-01-10 PROCEDURE — 77063 BREAST TOMOSYNTHESIS BI: CPT

## 2019-01-16 ENCOUNTER — HOSPITAL ENCOUNTER (OUTPATIENT)
Dept: PHARMACY | Age: 84
Setting detail: THERAPIES SERIES
Discharge: HOME | End: 2019-01-16
Payer: MEDICARE

## 2019-01-16 DIAGNOSIS — I26.99 OTHER PULMONARY EMBOLISM WITHOUT ACUTE COR PULMONALE, UNSPECIFIED CHRONICITY (HCC): ICD-10-CM

## 2019-01-16 LAB — POC INR: 1.8 (ref 0.8–1.2)

## 2019-01-16 PROCEDURE — 99211 OFF/OP EST MAY X REQ PHY/QHP: CPT

## 2019-01-16 PROCEDURE — 36416 COLLJ CAPILLARY BLOOD SPEC: CPT

## 2019-01-16 PROCEDURE — 85610 PROTHROMBIN TIME: CPT

## 2019-02-13 ENCOUNTER — HOSPITAL ENCOUNTER (OUTPATIENT)
Dept: PHARMACY | Age: 84
Setting detail: THERAPIES SERIES
Discharge: HOME | End: 2019-02-13
Payer: MEDICARE

## 2019-02-13 DIAGNOSIS — I26.99 OTHER PULMONARY EMBOLISM WITHOUT ACUTE COR PULMONALE, UNSPECIFIED CHRONICITY (HCC): ICD-10-CM

## 2019-02-13 LAB — POC INR: 3.1 (ref 0.8–1.2)

## 2019-02-13 PROCEDURE — 36416 COLLJ CAPILLARY BLOOD SPEC: CPT

## 2019-02-13 PROCEDURE — 99211 OFF/OP EST MAY X REQ PHY/QHP: CPT

## 2019-02-13 PROCEDURE — 85610 PROTHROMBIN TIME: CPT

## 2019-02-13 RX ORDER — ACETAMINOPHEN 500 MG
500 TABLET ORAL EVERY 6 HOURS PRN
COMMUNITY

## 2019-03-04 LAB
HCT VFR BLD CALC: 42.5 % (ref 35–44)
HEMOGLOBIN: 14.1 GM/DL (ref 12–15)

## 2019-03-06 ENCOUNTER — HOSPITAL ENCOUNTER (OUTPATIENT)
Dept: PHARMACY | Age: 84
Setting detail: THERAPIES SERIES
Discharge: HOME | End: 2019-03-06
Payer: MEDICARE

## 2019-03-06 DIAGNOSIS — I26.99 OTHER PULMONARY EMBOLISM WITHOUT ACUTE COR PULMONALE, UNSPECIFIED CHRONICITY (HCC): ICD-10-CM

## 2019-03-06 LAB — POC INR: 2.3 (ref 0.8–1.2)

## 2019-03-06 PROCEDURE — 99211 OFF/OP EST MAY X REQ PHY/QHP: CPT

## 2019-03-06 PROCEDURE — 36416 COLLJ CAPILLARY BLOOD SPEC: CPT

## 2019-03-06 PROCEDURE — 85610 PROTHROMBIN TIME: CPT

## 2019-04-03 ENCOUNTER — HOSPITAL ENCOUNTER (OUTPATIENT)
Dept: PHARMACY | Age: 84
Setting detail: THERAPIES SERIES
Discharge: HOME | End: 2019-04-03
Payer: MEDICARE

## 2019-04-03 DIAGNOSIS — I26.99 OTHER PULMONARY EMBOLISM WITHOUT ACUTE COR PULMONALE, UNSPECIFIED CHRONICITY (HCC): ICD-10-CM

## 2019-04-03 LAB — POC INR: 2 (ref 0.8–1.2)

## 2019-04-03 PROCEDURE — 85610 PROTHROMBIN TIME: CPT

## 2019-04-03 PROCEDURE — 36416 COLLJ CAPILLARY BLOOD SPEC: CPT

## 2019-04-03 PROCEDURE — 99211 OFF/OP EST MAY X REQ PHY/QHP: CPT

## 2019-04-03 NOTE — PROGRESS NOTES
Medication Management [facility]  Anticoagulation Clinic  [phone] (phone)  [phone] (fax)      Ms. Julissa Stephens is a 80 y.o.  female with history of PE, per Dr. Ledon Holstein referral, who presents today for Warfarin  monitoring and adjustment (4 week visit). Patient verifies current dosing regimen and tablet strength. No missed or extra doses. Patient denies bleeding/swelling/chest pain. Has usual SOB if walks too far. No blood in urine or stool. Dietary changes: eating more salad. Has lost 10# since the fall. No changes in medication/OTC agents/herbals. No change in alcohol use or tobacco use. No change in activity level. Patient denies headaches/dizziness/lightheadedness. Pooja Spar out front door about 3 weeks ago - fell against bench with right leg (had bruise; states lump not quite gone). Did not hit head. No vomiting/diarrhea or acute illness. No procedures scheduled in the future at this time. Assessment:   Lab Results   Component Value Date    INR 2.00 (H) 04/03/2019    INR 2.30 (H) 03/06/2019    INR 3.10 (H) 02/13/2019     INR therapeutic - goal 2-3. Recent Labs     04/03/19  1507   INR 2.00*     Plan:  POCT INR ordered/performed/result reviewed. Continue Coumadin 4 mg daily PO. Recheck INR in 4 weeks. Patient reminded to call the Anticoagulation Clinic with any signs or symptoms of bleeding or with any medication changes. Patient given instructions utilizing the teach back method. Discharged ambulatory in no apparent distress. After visit summary printed and reviewed with patient.       Medications reviewed and updated on home medication list.    Influenza vaccine:     [] given    [] declined   [] received previously   [] plans to receive at a later time   [] refused    [] documented in EPIC

## 2019-05-01 ENCOUNTER — APPOINTMENT (OUTPATIENT)
Dept: PHARMACY | Age: 84
End: 2019-05-01
Payer: MEDICARE

## 2019-05-07 ENCOUNTER — HOSPITAL ENCOUNTER (OUTPATIENT)
Dept: PHARMACY | Age: 84
Setting detail: THERAPIES SERIES
Discharge: HOME | End: 2019-05-07
Payer: MEDICARE

## 2019-05-07 DIAGNOSIS — I26.99 OTHER PULMONARY EMBOLISM WITHOUT ACUTE COR PULMONALE, UNSPECIFIED CHRONICITY (HCC): ICD-10-CM

## 2019-05-07 LAB — POC INR: 2 (ref 0.8–1.2)

## 2019-05-07 PROCEDURE — 85610 PROTHROMBIN TIME: CPT

## 2019-05-07 PROCEDURE — 99211 OFF/OP EST MAY X REQ PHY/QHP: CPT

## 2019-05-07 PROCEDURE — 36416 COLLJ CAPILLARY BLOOD SPEC: CPT

## 2019-06-04 ENCOUNTER — HOSPITAL ENCOUNTER (OUTPATIENT)
Dept: PHARMACY | Age: 84
Setting detail: THERAPIES SERIES
Discharge: HOME | End: 2019-06-04
Payer: MEDICARE

## 2019-06-04 DIAGNOSIS — I26.99 OTHER PULMONARY EMBOLISM WITHOUT ACUTE COR PULMONALE, UNSPECIFIED CHRONICITY (HCC): ICD-10-CM

## 2019-06-04 LAB — POC INR: 2 (ref 0.8–1.2)

## 2019-06-04 PROCEDURE — 36416 COLLJ CAPILLARY BLOOD SPEC: CPT

## 2019-06-04 PROCEDURE — 85610 PROTHROMBIN TIME: CPT

## 2019-06-04 PROCEDURE — 99212 OFFICE O/P EST SF 10 MIN: CPT

## 2019-06-04 RX ORDER — WARFARIN SODIUM 2 MG/1
TABLET ORAL EVERY EVENING
COMMUNITY
End: 2019-07-02

## 2019-06-04 RX ORDER — WARFARIN SODIUM 2 MG/1
TABLET ORAL
Qty: 60 TABLET | Refills: 11 | Status: SHIPPED | OUTPATIENT
Start: 2019-06-04 | End: 2019-12-18 | Stop reason: SDUPTHER

## 2019-06-04 NOTE — PROGRESS NOTES
Medication Management [facility]  Anticoagulation Clinic  [phone] (phone)  [phone] (fax)      Ms. Loreto Batista is a 80 y.o.  female with history of PE, per Dr. Roshan Benz referral, who presents today for Warfarin monitoring and adjustment (4 week visit). Patient verifies current dosing regimen and tablet strength. No missed or extra doses. Patient denies bleeding/bruising/swelling/chest pain. Has usual SOB, especially when tired. No blood in urine or stool. No dietary changes. No changes in medication/OTC agents/herbals. No change in alcohol use or tobacco use. No change in activity level. Patient denies headaches/dizziness/lightheadedness/falls. No vomiting/diarrhea or acute illness. No procedures scheduled in the future at this time. Assessment:   Lab Results   Component Value Date    INR 2.00 (H) 06/04/2019    INR 2.00 (H) 05/07/2019    INR 2.00 (H) 04/03/2019     INR therapeutic - goal 2-3. Recent Labs     06/04/19  1458   INR 2.00*       Plan:  POCT INR ordered/performed/result reviewed. Continue Coumadin 4 mg daily PO. Recheck INR in 4 weeks. Patient reminded to call the Anticoagulation Clinic with any signs or symptoms of bleeding or with any medication changes. Patient given instructions utilizing the teach back method. Discharged ambulatory in no apparent distress. Prescription renewed electronically by clinic pharmacist.        After visit summary printed and reviewed with patient.       Medications reviewed and updated on home medication list.    Influenza vaccine:     [] given    [] declined   [] received previously   [] plans to receive at a later time   [] refused    [] documented in EPIC

## 2019-06-17 ENCOUNTER — TELEPHONE (OUTPATIENT)
Dept: FAMILY MEDICINE CLINIC | Age: 84
End: 2019-06-17

## 2019-06-17 NOTE — TELEPHONE ENCOUNTER
Pt  Notified     1. Appt time and date. (give directions)   06/25/19 @ 3:00 with Jan     2. Arrive 15 min before appt. 3. Please bring all medications to appt. 4. Bring immunization record.   (if no record, which immunizations have you had and where?)

## 2019-06-25 ENCOUNTER — OFFICE VISIT (OUTPATIENT)
Dept: FAMILY MEDICINE CLINIC | Age: 84
End: 2019-06-25
Payer: MEDICARE

## 2019-06-25 VITALS
HEIGHT: 66 IN | SYSTOLIC BLOOD PRESSURE: 148 MMHG | DIASTOLIC BLOOD PRESSURE: 80 MMHG | WEIGHT: 167 LBS | HEART RATE: 68 BPM | TEMPERATURE: 97.8 F | BODY MASS INDEX: 26.84 KG/M2 | RESPIRATION RATE: 16 BRPM | OXYGEN SATURATION: 96 %

## 2019-06-25 DIAGNOSIS — E78.5 HYPERLIPIDEMIA, UNSPECIFIED HYPERLIPIDEMIA TYPE: ICD-10-CM

## 2019-06-25 DIAGNOSIS — E03.9 HYPOTHYROIDISM, UNSPECIFIED TYPE: Primary | ICD-10-CM

## 2019-06-25 DIAGNOSIS — M54.50 CHRONIC BILATERAL LOW BACK PAIN WITHOUT SCIATICA: ICD-10-CM

## 2019-06-25 DIAGNOSIS — I10 ESSENTIAL HYPERTENSION: ICD-10-CM

## 2019-06-25 DIAGNOSIS — G89.29 CHRONIC BILATERAL LOW BACK PAIN WITHOUT SCIATICA: ICD-10-CM

## 2019-06-25 PROCEDURE — 99214 OFFICE O/P EST MOD 30 MIN: CPT | Performed by: NURSE PRACTITIONER

## 2019-06-25 RX ORDER — LIDOCAINE 50 MG/G
1 PATCH TOPICAL DAILY
Qty: 30 PATCH | Refills: 3 | Status: SHIPPED | OUTPATIENT
Start: 2019-06-25 | End: 2019-07-02

## 2019-06-25 RX ORDER — DULOXETIN HYDROCHLORIDE 20 MG/1
20 CAPSULE, DELAYED RELEASE ORAL DAILY
Qty: 30 CAPSULE | Refills: 3 | Status: SHIPPED | OUTPATIENT
Start: 2019-06-25 | End: 2019-07-02

## 2019-06-25 ASSESSMENT — PATIENT HEALTH QUESTIONNAIRE - PHQ9
SUM OF ALL RESPONSES TO PHQ QUESTIONS 1-9: 0
1. LITTLE INTEREST OR PLEASURE IN DOING THINGS: 0
2. FEELING DOWN, DEPRESSED OR HOPELESS: 0
SUM OF ALL RESPONSES TO PHQ QUESTIONS 1-9: 0
SUM OF ALL RESPONSES TO PHQ9 QUESTIONS 1 & 2: 0

## 2019-06-25 ASSESSMENT — ENCOUNTER SYMPTOMS
BLOOD IN STOOL: 0
NAUSEA: 0
ABDOMINAL PAIN: 0
VOMITING: 0
RESPIRATORY NEGATIVE: 1
CONSTIPATION: 1
DIARRHEA: 0
BACK PAIN: 1
ABDOMINAL DISTENTION: 0

## 2019-06-25 NOTE — PROGRESS NOTES
[address]  [address] [address]  Dept: [phone]  Dept Fax: [phone]  Loc: [phone]    Julissa Stephens is a 80 y.o. Thurl Spine presents today for her medical conditions/complaints as noted below. Emma Barriga c/o of Establish Care and Back Pain (looking into a brace for assistance)      HPI:      Pt here to get established. She is here with her daughter. She has a  History of hypothyroidism. Hypothyroidism    HPI:  Currently treated for Hypothyroidism? Yes  Fatigue? No  Recent change in weight? No  Cold/Heat intolerance? No  Diarrhea/Constipation? No  Diaphoresis? No  Anxiety? No  Palpitations? No   Hair Loss? No  Her last levels were abnormal, will repeat. Pt stats her cholesterol is elevated and she takes cholesterol meds. She denies a heart attack or stroke, she does take diltiazem for HTN. She had  Stress test in 2015 that was normal.     She had been seeing Dr. Lucila Escobedo but she left town. Back Pain    HPI:  Symptoms have been present for many year(s). she describes the pain as aching, burning, radiating to lower leg(s) bilaterally  in the lumbar region. Inciting injury or history of trauma? No  Pain is relieved by - rest  Pain is aggravated by - standing and walking, she does state her whole back will hurt from neck to lumbar area. She has tried exercises and therapy in the past, she does not want any narcotics but would like to try a back brace. Radiation of the pain? Yes - sometimes it radiates down her legs but not all of the time. Paresthesias of the extremities? No  Saddle anesthesia? No  Bowel or bladder incontinence? No  Treatments tried - 2 extra strength tylenol and sometimes it helps. She would like referred to an orthopedist.    She does live alone and takes care of herself.        She has a history of blood clots and is on coumadin, she had a PE and a DVT         Current Outpatient Medications   Medication Sig Dispense Refill    lidocaine (LIDODERM) 5 % Place 1 patch onto the skin daily 12 hours on, 12 hours off. 30 patch 3    DULoxetine (CYMBALTA) 20 MG extended release capsule Take 1 capsule by mouth daily 30 capsule 3    warfarin (COUMADIN) 2 MG tablet Take as directed by [facility]. 60 tabs = 30 days supply 60 tablet 11    warfarin (COUMADIN) 2 MG tablet Take by mouth every evening Indications: Anticoagulant Therapy, Blockage of Blood Vessel to Lung by a Particle Dosed by [facility].  acetaminophen (TYLENOL) 500 MG tablet Take 500 mg by mouth every 6 hours as needed for Pain or Fever Don't take more than 3,000 mg each day.  albuterol sulfate HFA (PROAIR HFA) 108 (90 Base) MCG/ACT inhaler Inhale 2 puffs into the lungs every 6 hours as needed for Wheezing 1 Inhaler 3    SF 5000 PLUS 1.1 % CREA Place onto teeth 2 times daily       levothyroxine (SYNTHROID) 125 MCG tablet Take 125 mcg by mouth Daily VALENTINE. Takes MTWTHF only. 5-14-18.  atorvastatin (LIPITOR) 40 MG tablet Take 40 mg by mouth every evening. Indications: Blood Cholesterol Abnormal      diltiazem (DILACOR XR) 180 MG XR capsule Take 360 mg by mouth daily. Indications: High Blood Pressure       No current facility-administered medications for this visit. Past Medical History:   Diagnosis Date    Arthritis     wrist, back, neck, foot    Blood circulation, collateral     Breathing difficulty     Chronic kidney disease     DVT (deep venous thrombosis) (HCC)     History of blood transfusion 1960's    child birth    Hx of blood clots 2013? ??    left leg    Hyperlipidemia     Hypertension     Pneumonia     Prolonged emergence from general anesthesia     with hysterectomy? ?    Thyroid disease     Unspecified diseases of blood and blood-forming organs       Past Surgical History:   Procedure Laterality Date    COLONOSCOPY      last one before 2005???    Cardiovascular: Normal rate, regular rhythm and intact distal pulses. Murmur heard. Pulmonary/Chest: Effort normal and breath sounds normal.   Abdominal: Soft. Bowel sounds are normal. She exhibits no distension. There is no tenderness. Musculoskeletal:   Bilateral UE and LE strength equal and strong. Lumbar tenderness with palpation, no sciatica on either side, negative straight leg raise bilaterally. Skin: Skin is warm and dry. Psychiatric: She has a normal mood and affect. Her behavior is normal. Judgment and thought content normal.   Nursing note and vitals reviewed. Assessment/Plan:           1. Hypothyroidism, unspecified type    - TSH; Future  - T4, Free; Future    2. Chronic bilateral low back pain without sciatica    - lidocaine (LIDODERM) 5 %; Place 1 patch onto the skin daily 12 hours on, 12 hours off. Dispense: 30 patch; Refill: 3  - External Referral To Orthopedic Surgery    3. Hyperlipidemia, unspecified hyperlipidemia type    - Comprehensive Metabolic Panel; Future  - Lipid Panel; Future    4. Essential hypertension  Await new labs before refilling meds  - Comprehensive Metabolic Panel; Future  - Lipid Panel; Future  Pt declines screenings    Return in about 6 months (around 12/25/2019) for check up. Reccommended tobaccocessation options including pharmacologic methods, counseled great than 3 minutesduring this visit:  Yes[]  No  []       Patient given educational materials -see patient instructions. Discussed use, benefit, and side effects of prescribedmedications. All patient questions answered. Pt voiced understanding. Reviewedhealth maintenance. Instructed to continue current medications, diet and exercise. Patient agreed with treatment plan. Follow up as directed.        Electronicallysigned by LISA Santana Ra, CNP on 6/25/2019 at 3:38 PM

## 2019-06-26 ENCOUNTER — TELEPHONE (OUTPATIENT)
Dept: FAMILY MEDICINE CLINIC | Age: 84
End: 2019-06-26

## 2019-06-26 NOTE — TELEPHONE ENCOUNTER
Received denial for lidocaine as pt's insurance will only cover if pt has a Dx of something like pain associated with post-herpetic neuralgia. See attached media.

## 2019-06-27 ENCOUNTER — NURSE ONLY (OUTPATIENT)
Dept: LAB | Age: 84
End: 2019-06-27

## 2019-06-27 DIAGNOSIS — E78.5 HYPERLIPIDEMIA, UNSPECIFIED HYPERLIPIDEMIA TYPE: ICD-10-CM

## 2019-06-27 DIAGNOSIS — E03.9 HYPOTHYROIDISM, UNSPECIFIED TYPE: ICD-10-CM

## 2019-06-27 DIAGNOSIS — I10 ESSENTIAL HYPERTENSION: ICD-10-CM

## 2019-06-27 LAB
ALBUMIN SERPL-MCNC: 4.3 G/DL (ref 3.5–5.1)
ALP BLD-CCNC: 89 U/L (ref 38–126)
ALT SERPL-CCNC: 22 U/L (ref 11–66)
ANION GAP SERPL CALCULATED.3IONS-SCNC: 10 MEQ/L (ref 8–16)
AST SERPL-CCNC: 33 U/L (ref 5–40)
BILIRUB SERPL-MCNC: 0.6 MG/DL (ref 0.3–1.2)
BUN BLDV-MCNC: 15 MG/DL (ref 7–22)
CALCIUM SERPL-MCNC: 9.3 MG/DL (ref 8.5–10.5)
CHLORIDE BLD-SCNC: 103 MEQ/L (ref 98–111)
CHOLESTEROL, TOTAL: 157 MG/DL (ref 100–199)
CO2: 29 MEQ/L (ref 23–33)
CREAT SERPL-MCNC: 0.6 MG/DL (ref 0.4–1.2)
GFR SERPL CREATININE-BSD FRML MDRD: > 90 ML/MIN/1.73M2
GLUCOSE BLD-MCNC: 101 MG/DL (ref 70–108)
HDLC SERPL-MCNC: 72 MG/DL
LDL CHOLESTEROL CALCULATED: 64 MG/DL
POTASSIUM SERPL-SCNC: 4.3 MEQ/L (ref 3.5–5.2)
SODIUM BLD-SCNC: 142 MEQ/L (ref 135–145)
T4 FREE: 1.34 NG/DL (ref 0.93–1.76)
TOTAL PROTEIN: 7.7 G/DL (ref 6.1–8)
TRIGL SERPL-MCNC: 105 MG/DL (ref 0–199)
TSH SERPL DL<=0.05 MIU/L-ACNC: 2.84 UIU/ML (ref 0.4–4.2)

## 2019-06-28 ENCOUNTER — TELEPHONE (OUTPATIENT)
Dept: FAMILY MEDICINE CLINIC | Age: 84
End: 2019-06-28

## 2019-06-28 NOTE — TELEPHONE ENCOUNTER
----- Message from LISA Santana Ra, CNP sent at 6/28/2019  9:30 AM EDT -----  Please let pt know all of her lab work is normal. Please have her continue her current dosages of medicines.

## 2019-07-02 ENCOUNTER — HOSPITAL ENCOUNTER (OUTPATIENT)
Dept: PHARMACY | Age: 84
Setting detail: THERAPIES SERIES
Discharge: HOME | End: 2019-07-02
Payer: MEDICARE

## 2019-07-02 DIAGNOSIS — I26.99 OTHER PULMONARY EMBOLISM WITHOUT ACUTE COR PULMONALE, UNSPECIFIED CHRONICITY (HCC): ICD-10-CM

## 2019-07-02 LAB — POC INR: 2.1 (ref 0.8–1.2)

## 2019-07-02 PROCEDURE — 36416 COLLJ CAPILLARY BLOOD SPEC: CPT

## 2019-07-02 PROCEDURE — 85610 PROTHROMBIN TIME: CPT

## 2019-07-02 PROCEDURE — 99211 OFF/OP EST MAY X REQ PHY/QHP: CPT

## 2019-07-02 NOTE — PROGRESS NOTES
Medication Management [facility]  Anticoagulation Clinic  [phone] (phone)  [phone] (fax)      Ms. Julian Burrows is a 80 y.o.  female with history of PE, per Dr. Verenice Jiménez referral, who presents today for Warfarin monitoring and adjustment (4 week visit). Patient verifies current dosing regimen and tablet strength. No missed or extra doses. Patient denies swelling/chest pain. Had one minor spontaneous nosebleed. Has SOB with high humidity. Has usual easy bruising. Wearing usual compression socks to prevent swelling. No blood in urine or stool. No dietary changes. No changes in medication/OTC agents/herbals. No change in alcohol use or tobacco use. No change in activity level. Patient denies headaches/falls. Had 2 slight spells of dizziness - not unusual.  No vomiting/diarrhea or acute illness. No procedures scheduled in the future at this time. Sees doctor at [facility] 7/15 for back - using Tylenol. Reminded to call this clinic if any procedure scheduled. Dr. Arjun Colby left town; will ask for new referral from new PCP's office. Couldn't afford Cymbalta and Lidocaine patches she ordered. Assessment:   Lab Results   Component Value Date    INR 2.10 (H) 07/02/2019    INR 2.00 (H) 06/04/2019    INR 2.00 (H) 05/07/2019     INR therapeutic - goal 2-3. Recent Labs     07/02/19  1509   INR 2.10*     Plan:  POCT INR ordered/performed/result reviewed. Continue Coumadin 4 mg daily PO. Recheck INR in 5 weeks. Patient reminded to call the Anticoagulation Clinic with any signs or symptoms of bleeding or with any medication changes. Patient given instructions utilizing the teach back method. Discharged ambulatory in no apparent distress. After visit summary printed and reviewed with patient.       Medications reviewed and updated on home medication list.     Influenza vaccine:     [] given    [] declined   [] received previously   [] plans to receive at a later time   [] refused    [] documented in EPIC

## 2019-07-03 ENCOUNTER — TELEPHONE (OUTPATIENT)
Dept: PHARMACY | Age: 84
End: 2019-07-03

## 2019-07-03 ENCOUNTER — TELEPHONE (OUTPATIENT)
Dept: FAMILY MEDICINE CLINIC | Age: 84
End: 2019-07-03

## 2019-07-03 DIAGNOSIS — I26.99 OTHER PULMONARY EMBOLISM WITHOUT ACUTE COR PULMONALE, UNSPECIFIED CHRONICITY (HCC): ICD-10-CM

## 2019-07-03 DIAGNOSIS — Z79.01 ANTICOAGULATED ON COUMADIN: Primary | ICD-10-CM

## 2019-07-03 NOTE — TELEPHONE ENCOUNTER
Message left on voicemail at PCP's office asking for electronic Coumadin Clinic referral from one of the physicians.

## 2019-08-06 ENCOUNTER — HOSPITAL ENCOUNTER (OUTPATIENT)
Dept: PHARMACY | Age: 84
Setting detail: THERAPIES SERIES
Discharge: HOME | End: 2019-08-06
Payer: MEDICARE

## 2019-08-06 DIAGNOSIS — I26.99 OTHER PULMONARY EMBOLISM WITHOUT ACUTE COR PULMONALE, UNSPECIFIED CHRONICITY (HCC): ICD-10-CM

## 2019-08-06 LAB — POC INR: 2.1 (ref 0.8–1.2)

## 2019-08-06 PROCEDURE — 36416 COLLJ CAPILLARY BLOOD SPEC: CPT

## 2019-08-06 PROCEDURE — 85610 PROTHROMBIN TIME: CPT

## 2019-08-06 PROCEDURE — 99211 OFF/OP EST MAY X REQ PHY/QHP: CPT

## 2019-09-10 ENCOUNTER — HOSPITAL ENCOUNTER (OUTPATIENT)
Dept: PHARMACY | Age: 84
Setting detail: THERAPIES SERIES
Discharge: HOME | End: 2019-09-10
Payer: MEDICARE

## 2019-09-10 DIAGNOSIS — I26.99 OTHER PULMONARY EMBOLISM WITHOUT ACUTE COR PULMONALE, UNSPECIFIED CHRONICITY (HCC): ICD-10-CM

## 2019-09-10 LAB — POC INR: 2.1 (ref 0.8–1.2)

## 2019-09-10 PROCEDURE — 85610 PROTHROMBIN TIME: CPT

## 2019-09-10 PROCEDURE — 99211 OFF/OP EST MAY X REQ PHY/QHP: CPT

## 2019-09-10 PROCEDURE — 36416 COLLJ CAPILLARY BLOOD SPEC: CPT

## 2019-09-10 NOTE — PROGRESS NOTES
Medication Management [facility]  Anticoagulation Clinic  [phone] (phone)  [phone] (fax)      Ms. Brett Saldaña is a 80 y.o.  female with history of PE, per Dr. Pattie Hinton referral, who presents today for Warfarin monitoring and adjustment (5 week visit). Patient verifies current dosing regimen and tablet strength. No missed or extra doses. Patient denies swelling/chest pain. Has usual SOB with walking. Wears support socks. Gets a little blood on tissue after blowing nose - usual.  Has usual easy bruising. No blood in urine or stool. Dietary changes: eating less salad, but more green vegetables. Has not been peeling cucumbers - reminded to. No changes in medication/OTC agents/herbals. No change in alcohol use or tobacco use. No change in activity level. Had a little more stress. Patient denies headaches/dizziness/lightheadedness/falls. No vomiting/diarrhea or acute illness. No procedures scheduled in the future at this time. Assessment:   Lab Results   Component Value Date    INR 2.10 (H) 09/10/2019    INR 2.10 (H) 08/06/2019    INR 2.10 (H) 07/02/2019     INR therapeutic - goal 2-3. Recent Labs     09/10/19  1511   INR 2.10*     Plan:  POCT INR ordered/performed/result reviewed. Continue Coumadin 4 mg daily PO. Recheck INR in 6 weeks. Patient reminded to call the Anticoagulation Clinic with any signs or symptoms of bleeding or with any medication changes. Patient given instructions utilizing the teach back method. Given routine H&H order. Discharged ambulatory in no apparent distress. After visit summary printed and reviewed with patient.       Medications reviewed and updated on home medication list.    Influenza vaccine:     [] given    [] declined   [] received previously   [] plans to receive at a later time   [] refused    [] documented in EPIC

## 2019-09-19 ENCOUNTER — NURSE ONLY (OUTPATIENT)
Dept: LAB | Age: 84
End: 2019-09-19

## 2019-09-19 DIAGNOSIS — Z86.718 HX OF BLOOD CLOTS: ICD-10-CM

## 2019-09-19 LAB
HCT VFR BLD CALC: 42.7 % (ref 37–47)
HEMOGLOBIN: 13.9 GM/DL (ref 12–16)

## 2019-10-09 DIAGNOSIS — I10 ESSENTIAL HYPERTENSION: ICD-10-CM

## 2019-10-09 DIAGNOSIS — E03.9 HYPOTHYROIDISM, UNSPECIFIED TYPE: Primary | ICD-10-CM

## 2019-10-09 RX ORDER — DILTIAZEM HYDROCHLORIDE 180 MG/1
360 CAPSULE, EXTENDED RELEASE ORAL DAILY
Qty: 60 CAPSULE | Refills: 5 | Status: SHIPPED | OUTPATIENT
Start: 2019-10-09 | End: 2019-12-19 | Stop reason: SDUPTHER

## 2019-10-09 RX ORDER — LEVOTHYROXINE SODIUM 0.12 MG/1
125 TABLET ORAL DAILY
Qty: 30 TABLET | Refills: 5 | Status: SHIPPED | OUTPATIENT
Start: 2019-10-09 | End: 2019-12-19 | Stop reason: SDUPTHER

## 2019-10-16 RX ORDER — ATORVASTATIN CALCIUM 40 MG/1
40 TABLET, FILM COATED ORAL EVERY EVENING
Qty: 90 TABLET | Refills: 3 | Status: SHIPPED | OUTPATIENT
Start: 2019-10-16 | End: 2019-12-19 | Stop reason: SDUPTHER

## 2019-10-22 ENCOUNTER — HOSPITAL ENCOUNTER (OUTPATIENT)
Dept: PHARMACY | Age: 84
Setting detail: THERAPIES SERIES
Discharge: HOME | End: 2019-10-22
Payer: MEDICARE

## 2019-10-22 DIAGNOSIS — I26.99 OTHER PULMONARY EMBOLISM WITHOUT ACUTE COR PULMONALE, UNSPECIFIED CHRONICITY (HCC): ICD-10-CM

## 2019-10-22 LAB — POC INR: 3.3 (ref 0.8–1.2)

## 2019-10-22 PROCEDURE — 85610 PROTHROMBIN TIME: CPT

## 2019-10-22 PROCEDURE — 36416 COLLJ CAPILLARY BLOOD SPEC: CPT

## 2019-10-22 PROCEDURE — 99211 OFF/OP EST MAY X REQ PHY/QHP: CPT

## 2019-11-13 ENCOUNTER — HOSPITAL ENCOUNTER (OUTPATIENT)
Dept: PHARMACY | Age: 84
Setting detail: THERAPIES SERIES
Discharge: HOME | End: 2019-11-13
Payer: MEDICARE

## 2019-11-13 DIAGNOSIS — I26.99 OTHER PULMONARY EMBOLISM WITHOUT ACUTE COR PULMONALE, UNSPECIFIED CHRONICITY (HCC): ICD-10-CM

## 2019-11-13 LAB — POC INR: 2.5 (ref 0.8–1.2)

## 2019-11-13 PROCEDURE — 85610 PROTHROMBIN TIME: CPT

## 2019-11-13 PROCEDURE — 99211 OFF/OP EST MAY X REQ PHY/QHP: CPT

## 2019-11-13 PROCEDURE — 36416 COLLJ CAPILLARY BLOOD SPEC: CPT

## 2019-12-18 ENCOUNTER — HOSPITAL ENCOUNTER (OUTPATIENT)
Dept: PHARMACY | Age: 84
Setting detail: THERAPIES SERIES
Discharge: HOME | End: 2019-12-18
Payer: MEDICARE

## 2019-12-18 DIAGNOSIS — I26.99 OTHER PULMONARY EMBOLISM WITHOUT ACUTE COR PULMONALE, UNSPECIFIED CHRONICITY (HCC): ICD-10-CM

## 2019-12-18 LAB — POC INR: 2.6 (ref 0.8–1.2)

## 2019-12-18 PROCEDURE — 85610 PROTHROMBIN TIME: CPT

## 2019-12-18 PROCEDURE — 99212 OFFICE O/P EST SF 10 MIN: CPT

## 2019-12-18 PROCEDURE — 36416 COLLJ CAPILLARY BLOOD SPEC: CPT

## 2019-12-18 RX ORDER — WARFARIN SODIUM 2 MG/1
TABLET ORAL
Qty: 180 TABLET | Refills: 3 | Status: SHIPPED | OUTPATIENT
Start: 2019-12-18 | End: 2020-12-16

## 2019-12-18 RX ORDER — WARFARIN SODIUM 2 MG/1
TABLET ORAL EVERY EVENING
COMMUNITY
End: 2019-12-19 | Stop reason: ALTCHOICE

## 2019-12-19 ENCOUNTER — OFFICE VISIT (OUTPATIENT)
Dept: FAMILY MEDICINE CLINIC | Age: 84
End: 2019-12-19
Payer: MEDICARE

## 2019-12-19 VITALS
HEART RATE: 80 BPM | BODY MASS INDEX: 27.48 KG/M2 | HEIGHT: 66 IN | WEIGHT: 171 LBS | SYSTOLIC BLOOD PRESSURE: 132 MMHG | TEMPERATURE: 98.2 F | RESPIRATION RATE: 16 BRPM | OXYGEN SATURATION: 91 % | DIASTOLIC BLOOD PRESSURE: 64 MMHG

## 2019-12-19 DIAGNOSIS — R53.83 FATIGUE, UNSPECIFIED TYPE: ICD-10-CM

## 2019-12-19 DIAGNOSIS — I10 ESSENTIAL HYPERTENSION: ICD-10-CM

## 2019-12-19 DIAGNOSIS — E78.5 HYPERLIPIDEMIA, UNSPECIFIED HYPERLIPIDEMIA TYPE: ICD-10-CM

## 2019-12-19 DIAGNOSIS — R06.02 SOB (SHORTNESS OF BREATH): Primary | ICD-10-CM

## 2019-12-19 DIAGNOSIS — E03.9 HYPOTHYROIDISM, UNSPECIFIED TYPE: ICD-10-CM

## 2019-12-19 DIAGNOSIS — Z86.711 HISTORY OF PULMONARY EMBOLISM: ICD-10-CM

## 2019-12-19 PROCEDURE — 93000 ELECTROCARDIOGRAM COMPLETE: CPT | Performed by: NURSE PRACTITIONER

## 2019-12-19 PROCEDURE — 99214 OFFICE O/P EST MOD 30 MIN: CPT | Performed by: NURSE PRACTITIONER

## 2019-12-19 RX ORDER — LEVOTHYROXINE SODIUM 0.12 MG/1
125 TABLET ORAL DAILY
Qty: 90 TABLET | Refills: 4 | Status: SHIPPED | OUTPATIENT
Start: 2019-12-19 | End: 2021-03-18 | Stop reason: SDUPTHER

## 2019-12-19 RX ORDER — DILTIAZEM HYDROCHLORIDE 180 MG/1
360 CAPSULE, EXTENDED RELEASE ORAL DAILY
Qty: 180 CAPSULE | Refills: 4 | Status: SHIPPED | OUTPATIENT
Start: 2019-12-19 | End: 2021-03-18 | Stop reason: SDUPTHER

## 2019-12-19 RX ORDER — ATORVASTATIN CALCIUM 20 MG/1
20 TABLET, FILM COATED ORAL EVERY EVENING
Qty: 90 TABLET | Refills: 4 | Status: SHIPPED | OUTPATIENT
Start: 2019-12-19 | End: 2021-03-18 | Stop reason: SDUPTHER

## 2019-12-19 ASSESSMENT — ENCOUNTER SYMPTOMS
RHINORRHEA: 0
CHEST TIGHTNESS: 0
COUGH: 0
SINUS PAIN: 0
SHORTNESS OF BREATH: 1
CHOKING: 0
BACK PAIN: 0
GASTROINTESTINAL NEGATIVE: 1
SINUS PRESSURE: 0
WHEEZING: 0

## 2019-12-20 ENCOUNTER — HOSPITAL ENCOUNTER (OUTPATIENT)
Dept: GENERAL RADIOLOGY | Age: 84
Discharge: HOME | End: 2019-12-20
Payer: MEDICARE

## 2019-12-20 ENCOUNTER — HOSPITAL ENCOUNTER (OUTPATIENT)
Age: 84
Discharge: HOME | End: 2019-12-20
Payer: MEDICARE

## 2019-12-20 ENCOUNTER — TELEPHONE (OUTPATIENT)
Dept: FAMILY MEDICINE CLINIC | Age: 84
End: 2019-12-20

## 2019-12-20 DIAGNOSIS — R53.83 FATIGUE, UNSPECIFIED TYPE: ICD-10-CM

## 2019-12-20 DIAGNOSIS — E03.9 HYPOTHYROIDISM, UNSPECIFIED TYPE: ICD-10-CM

## 2019-12-20 DIAGNOSIS — E78.5 HYPERLIPIDEMIA, UNSPECIFIED HYPERLIPIDEMIA TYPE: ICD-10-CM

## 2019-12-20 DIAGNOSIS — R06.02 SOB (SHORTNESS OF BREATH): ICD-10-CM

## 2019-12-20 DIAGNOSIS — Z86.711 HISTORY OF PULMONARY EMBOLISM: ICD-10-CM

## 2019-12-20 LAB
BASOPHILS # BLD: 0.6 %
BASOPHILS ABSOLUTE: 0.1 THOU/MM3 (ref 0–0.1)
CHOLESTEROL, TOTAL: 179 MG/DL (ref 100–199)
EOSINOPHIL # BLD: 1 %
EOSINOPHILS ABSOLUTE: 0.1 THOU/MM3 (ref 0–0.4)
ERYTHROCYTE [DISTWIDTH] IN BLOOD BY AUTOMATED COUNT: 13.3 % (ref 11.5–14.5)
ERYTHROCYTE [DISTWIDTH] IN BLOOD BY AUTOMATED COUNT: 44 FL (ref 35–45)
HCT VFR BLD CALC: 45.3 % (ref 37–47)
HDLC SERPL-MCNC: 69 MG/DL
HEMOGLOBIN: 14.7 GM/DL (ref 12–16)
IMMATURE GRANS (ABS): 0.02 THOU/MM3 (ref 0–0.07)
IMMATURE GRANULOCYTES: 0.2 %
LDL CHOLESTEROL CALCULATED: 84 MG/DL
LYMPHOCYTES # BLD: 45.7 %
LYMPHOCYTES ABSOLUTE: 4.1 THOU/MM3 (ref 1–4.8)
MCH RBC QN AUTO: 29.5 PG (ref 26–33)
MCHC RBC AUTO-ENTMCNC: 32.5 GM/DL (ref 32.2–35.5)
MCV RBC AUTO: 91 FL (ref 81–99)
MONOCYTES # BLD: 7.2 %
MONOCYTES ABSOLUTE: 0.6 THOU/MM3 (ref 0.4–1.3)
NUCLEATED RED BLOOD CELLS: 0 /100 WBC
PLATELET # BLD: 226 THOU/MM3 (ref 130–400)
PMV BLD AUTO: 11.3 FL (ref 9.4–12.4)
PRO-BNP: 171.2 PG/ML (ref 0–1800)
RBC # BLD: 4.98 MILL/MM3 (ref 4.2–5.4)
SEG NEUTROPHILS: 45.3 %
SEGMENTED NEUTROPHILS ABSOLUTE COUNT: 4 THOU/MM3 (ref 1.8–7.7)
T4 FREE: 1.52 NG/DL (ref 0.93–1.76)
TRIGL SERPL-MCNC: 128 MG/DL (ref 0–199)
TSH SERPL DL<=0.05 MIU/L-ACNC: 9.61 UIU/ML (ref 0.4–4.2)
WBC # BLD: 8.9 THOU/MM3 (ref 4.8–10.8)

## 2019-12-20 PROCEDURE — 36415 COLL VENOUS BLD VENIPUNCTURE: CPT

## 2019-12-20 PROCEDURE — 71046 X-RAY EXAM CHEST 2 VIEWS: CPT

## 2019-12-20 PROCEDURE — 83880 ASSAY OF NATRIURETIC PEPTIDE: CPT

## 2019-12-20 PROCEDURE — 84443 ASSAY THYROID STIM HORMONE: CPT

## 2019-12-20 PROCEDURE — 84439 ASSAY OF FREE THYROXINE: CPT

## 2019-12-20 PROCEDURE — 85025 COMPLETE CBC W/AUTO DIFF WBC: CPT

## 2019-12-20 PROCEDURE — 80061 LIPID PANEL: CPT

## 2019-12-23 ENCOUNTER — TELEPHONE (OUTPATIENT)
Dept: FAMILY MEDICINE CLINIC | Age: 84
End: 2019-12-23

## 2019-12-23 DIAGNOSIS — E03.9 HYPOTHYROIDISM, UNSPECIFIED TYPE: Primary | ICD-10-CM

## 2020-01-03 ENCOUNTER — HOSPITAL ENCOUNTER (OUTPATIENT)
Dept: CT IMAGING | Age: 85
Discharge: HOME | End: 2020-01-03
Payer: MEDICARE

## 2020-01-03 LAB — POC CREATININE WHOLE BLOOD: 0.8 MG/DL (ref 0.5–1.2)

## 2020-01-03 PROCEDURE — 82565 ASSAY OF CREATININE: CPT

## 2020-01-03 PROCEDURE — 6360000004 HC RX CONTRAST MEDICATION: Performed by: NURSE PRACTITIONER

## 2020-01-03 PROCEDURE — 71275 CT ANGIOGRAPHY CHEST: CPT

## 2020-01-03 RX ADMIN — IOPAMIDOL 90 ML: 755 INJECTION, SOLUTION INTRAVENOUS at 14:35

## 2020-01-06 ENCOUNTER — TELEPHONE (OUTPATIENT)
Dept: FAMILY MEDICINE CLINIC | Age: 85
End: 2020-01-06

## 2020-01-06 NOTE — TELEPHONE ENCOUNTER
----- Message from LISA Talbert - CNP sent at 1/6/2020  8:17 AM EST -----  Let pt know her lung test was negative for any blood clots, it did identify some scarring in her lower lung bases which is not new, nothing to do different with this, she has no enlarged lymph nodes which is good, there is a small hernia in her upper chest which may cuase some acid reflux from time to time but nothing to worry about. Let me know if she has any questions.

## 2020-01-22 ENCOUNTER — HOSPITAL ENCOUNTER (OUTPATIENT)
Dept: PHARMACY | Age: 85
Setting detail: THERAPIES SERIES
Discharge: HOME | End: 2020-01-22
Payer: MEDICARE

## 2020-01-22 LAB — POC INR: 2.9 (ref 0.8–1.2)

## 2020-01-22 PROCEDURE — 85610 PROTHROMBIN TIME: CPT

## 2020-01-22 PROCEDURE — 99211 OFF/OP EST MAY X REQ PHY/QHP: CPT

## 2020-01-22 PROCEDURE — 36416 COLLJ CAPILLARY BLOOD SPEC: CPT

## 2020-01-22 NOTE — PROGRESS NOTES
Medication Management [facility]  Anticoagulation Clinic  [phone] (phone)  [phone] (fax)      Ms. Beto Mckeon is a 80 y.o.  female with history of PE, per Dr. Delia Buchanan referral, who presents today for Warfarin monitoring and adjustment (5 week visit). Patient verifies current dosing regimen and tablet strength. No missed or extra doses. Patient denies swelling/chest pain. Awoke this morning with dried blood on face - believes it to be from nose. Routinely sees a little blood on tissue after blowing nose. Does humidify home air. Has usual easy bruising. SOB has improved. Generally feels better since she's been taking Synthroid separately and on empty stomach for past month. No blood in urine or stool. No dietary changes. No changes in medication/OTC agents/herbals. No change in alcohol use or tobacco use. No change in activity level. Patient denies headaches/dizziness/lightheadedness/falls. No vomiting/diarrhea or acute illness. Takes Tylenol for usual back pain. No procedures scheduled in the future at this time. Assessment:   Lab Results   Component Value Date    INR 2.90 (H) 01/22/2020    INR 2.60 (H) 12/18/2019    INR 2.50 (H) 11/13/2019     INR therapeutic - goal 2-3. Recent Labs     01/22/20  1513   INR 2.90*       Plan:  POCT INR ordered/performed/result reviewed. Continue Coumadin 4 mg daily PO. Recheck INR in 5 weeks. Patient reminded to call the Anticoagulation Clinic with any signs or symptoms of bleeding or with any medication changes. Patient given instructions utilizing the teach back method. Discharged ambulatory in no apparent distress. After visit summary printed and reviewed with patient.       Medications reviewed and updated on home medication list.    Influenza vaccine: doesn't plan to take this season.  [] given    [x] declined   [] received previously   [] plans to receive at a later time   [x] refused    [x] documented in EPIC

## 2020-02-04 ENCOUNTER — NURSE ONLY (OUTPATIENT)
Dept: LAB | Age: 85
End: 2020-02-04

## 2020-02-04 LAB — TSH SERPL DL<=0.05 MIU/L-ACNC: 0.9 UIU/ML (ref 0.4–4.2)

## 2020-02-05 ENCOUNTER — TELEPHONE (OUTPATIENT)
Dept: FAMILY MEDICINE CLINIC | Age: 85
End: 2020-02-05

## 2020-02-05 NOTE — TELEPHONE ENCOUNTER
----- Message from LISA Lloyd CNP sent at 2/5/2020  1:49 PM EST -----  Please let pt know her thyroid numbers are in normal range, have her continue at her current dose.

## 2020-02-26 ENCOUNTER — APPOINTMENT (OUTPATIENT)
Dept: PHARMACY | Age: 85
End: 2020-02-26
Payer: MEDICARE

## 2020-03-03 ENCOUNTER — HOSPITAL ENCOUNTER (OUTPATIENT)
Dept: PHARMACY | Age: 85
Setting detail: THERAPIES SERIES
Discharge: HOME | End: 2020-03-03
Payer: MEDICARE

## 2020-03-03 LAB — POC INR: 1.7 (ref 0.8–1.2)

## 2020-03-03 PROCEDURE — 99211 OFF/OP EST MAY X REQ PHY/QHP: CPT

## 2020-03-03 PROCEDURE — 36416 COLLJ CAPILLARY BLOOD SPEC: CPT

## 2020-03-03 PROCEDURE — 85610 PROTHROMBIN TIME: CPT

## 2020-03-03 NOTE — PROGRESS NOTES
Medication Management [address]  [phone] (phone)  [phone] (fax)      Ms. Deven Gonzales is a 80 y.o.  female with history of PE, per Dr. Eboni Callejas referral, who presents today for Warfarin monitoring and adjustment (1 week late for 5 week visit). Patient verifies current dosing regimen and tablet strength. No missed or extra doses. Patient denies bruising/swelling/chest pain. Has usual SOB/fatigue. Wears support socks. Has seen blood on tissue after wiping nose. No blood in urine or stool. No dietary changes. No changes in medication/OTC agents/herbals. No change in alcohol use or tobacco use. No change in activity level. Had great deal more family stress. Patient denies headaches/dizziness/lightheadedness/falls. No vomiting/diarrhea or acute illness. No procedures scheduled in the future at this time. Assessment:   Lab Results   Component Value Date    INR 1.70 (H) 03/03/2020    INR 2.90 (H) 01/22/2020    INR 2.60 (H) 12/18/2019     INR subtherapeutic - goal 2-3. Recent Labs     03/03/20  1408   INR 1.70*       Plan:  POCT INR ordered/performed/result reviewed. 6 mg today, per policy, then continue Coumadin 4 mg daily PO. Recheck INR in 3 weeks, per policy. Patient reminded to call the Anticoagulation Clinic with any signs or symptoms of bleeding or with any medication changes. Patient given instructions utilizing the teach back method. Discharged ambulatory in no apparent distress. After visit summary printed and reviewed with patient. Medications reviewed and updated on home medication list.    Influenza vaccine: doesn't take.   [] given    [] declined   [] received previously   [] plans to receive at a later time   [] refused    [x] documented in [facility]

## 2020-03-20 ENCOUNTER — APPOINTMENT (OUTPATIENT)
Dept: INTERVENTIONAL RADIOLOGY/VASCULAR | Age: 85
End: 2020-03-20
Payer: MEDICARE

## 2020-03-20 ENCOUNTER — HOSPITAL ENCOUNTER (EMERGENCY)
Age: 85
Discharge: HOME | End: 2020-03-20
Payer: MEDICARE

## 2020-03-20 VITALS
WEIGHT: 165 LBS | DIASTOLIC BLOOD PRESSURE: 81 MMHG | TEMPERATURE: 97.9 F | HEART RATE: 79 BPM | RESPIRATION RATE: 18 BRPM | OXYGEN SATURATION: 94 % | SYSTOLIC BLOOD PRESSURE: 156 MMHG | HEIGHT: 66 IN | BODY MASS INDEX: 26.52 KG/M2

## 2020-03-20 LAB — INR BLD: 2.72 (ref 0.85–1.13)

## 2020-03-20 PROCEDURE — 36415 COLL VENOUS BLD VENIPUNCTURE: CPT

## 2020-03-20 PROCEDURE — 85610 PROTHROMBIN TIME: CPT

## 2020-03-20 PROCEDURE — 2709999900 HC NON-CHARGEABLE SUPPLY

## 2020-03-20 PROCEDURE — 99283 EMERGENCY DEPT VISIT LOW MDM: CPT

## 2020-03-20 PROCEDURE — 93971 EXTREMITY STUDY: CPT

## 2020-03-20 ASSESSMENT — ENCOUNTER SYMPTOMS
BACK PAIN: 1
COUGH: 0
COLOR CHANGE: 0
CHEST TIGHTNESS: 0
SHORTNESS OF BREATH: 0

## 2020-03-20 ASSESSMENT — PAIN SCALES - GENERAL: PAINLEVEL_OUTOF10: 8

## 2020-03-20 ASSESSMENT — PAIN DESCRIPTION - PAIN TYPE: TYPE: ACUTE PAIN

## 2020-03-20 ASSESSMENT — PAIN DESCRIPTION - DESCRIPTORS: DESCRIPTORS: ACHING

## 2020-03-20 ASSESSMENT — PAIN DESCRIPTION - ORIENTATION: ORIENTATION: LEFT

## 2020-03-20 ASSESSMENT — PAIN DESCRIPTION - LOCATION: LOCATION: KNEE

## 2020-03-20 ASSESSMENT — PAIN DESCRIPTION - FREQUENCY: FREQUENCY: INTERMITTENT

## 2020-03-20 NOTE — ED TRIAGE NOTES
Patient to room 40 from intake for complaint of pain behind her left knee. Patient has a history of DVT and is taking Coumadin daily. Patient states \"I know it is a blood clot because I have had them before. \"  Patient also became concerned because she started to feel slightly nauseated today.

## 2020-03-24 ENCOUNTER — APPOINTMENT (OUTPATIENT)
Dept: PHARMACY | Age: 85
End: 2020-03-24
Payer: MEDICARE

## 2020-03-24 ENCOUNTER — TELEPHONE (OUTPATIENT)
Dept: PHARMACY | Age: 85
End: 2020-03-24

## 2020-03-24 NOTE — TELEPHONE ENCOUNTER
Patient called and wanted us to know she was just in the hospital Friday  They tested her INR and she wants  To cancel her appointment today and wants us to look at the INR results from Friday. Please call the patient back with instructions.

## 2020-05-05 ENCOUNTER — HOSPITAL ENCOUNTER (OUTPATIENT)
Dept: PHARMACY | Age: 85
Setting detail: THERAPIES SERIES
Discharge: HOME | End: 2020-05-05
Payer: MEDICARE

## 2020-05-05 ENCOUNTER — NURSE ONLY (OUTPATIENT)
Dept: LAB | Age: 85
End: 2020-05-05

## 2020-05-05 LAB — INR BLD: 2.47 (ref 0.85–1.13)

## 2020-05-05 PROCEDURE — 99211 OFF/OP EST MAY X REQ PHY/QHP: CPT | Performed by: PHARMACIST

## 2020-06-29 ENCOUNTER — TELEPHONE (OUTPATIENT)
Dept: PHARMACY | Age: 85
End: 2020-06-29

## 2020-06-29 NOTE — TELEPHONE ENCOUNTER
supply    Pt takes 4 mg every day) 180 tablet 3    acetaminophen (TYLENOL) 500 MG tablet Take 500 mg by mouth every 6 hours as needed for Pain or Fever Don't take more than 3,000 mg each day.  SF 5000 PLUS 1.1 % CREA Place onto teeth 2 times daily        No current facility-administered medications for this visit. No new medications, OTCs, supplements, or herbal products. CPA consent. In the last month have you been in contact with someone who has been confirmed or suspected to have Coronavirus/COVID-19? No    If yes, does the patient have any of the following symptoms: N/A       If patient has symptoms, please direct to appropriate flu clinic. Have you traveled internationally in the past month? No  If yes, where?  N/A    Donnajean Cowden, PharmD candidate

## 2020-06-30 ENCOUNTER — HOSPITAL ENCOUNTER (OUTPATIENT)
Dept: PHARMACY | Age: 85
Setting detail: THERAPIES SERIES
Discharge: HOME | End: 2020-06-30
Payer: MEDICARE

## 2020-06-30 ENCOUNTER — NURSE ONLY (OUTPATIENT)
Dept: LAB | Age: 85
End: 2020-06-30

## 2020-06-30 LAB
HCT VFR BLD CALC: 45.3 % (ref 37–47)
HEMOGLOBIN: 14.5 GM/DL (ref 12–16)
INR BLD: 2.61 (ref 0.85–1.13)

## 2020-06-30 PROCEDURE — 99211 OFF/OP EST MAY X REQ PHY/QHP: CPT

## 2020-08-11 ENCOUNTER — HOSPITAL ENCOUNTER (OUTPATIENT)
Dept: PHARMACY | Age: 85
Setting detail: THERAPIES SERIES
Discharge: HOME | End: 2020-08-11
Payer: MEDICARE

## 2020-08-11 VITALS — TEMPERATURE: 98.4 F

## 2020-08-11 LAB — POC INR: 2.4 (ref 0.8–1.2)

## 2020-08-11 PROCEDURE — 85610 PROTHROMBIN TIME: CPT

## 2020-08-11 PROCEDURE — 36416 COLLJ CAPILLARY BLOOD SPEC: CPT

## 2020-08-11 PROCEDURE — 99211 OFF/OP EST MAY X REQ PHY/QHP: CPT

## 2020-08-11 NOTE — PROGRESS NOTES
Medication Management [address]  [phone] (phone)  [phone] (fax)      Ms. Quoc Giron is a 80 y.o.  female with history of PE, per Dr. Rachel Jacobs referral,  who presents today for Warfarin monitoring and adjustment (6 week visit). Patient verifies current dosing regimen and tablet strength. No missed or extra doses. Patient denies bleeding/bruising/chest pain. Has usual swelling of left leg - wears compress socks bilat. Has SOB with wearing mask. No blood in urine or stool. No dietary changes. No changes in medication/OTC agents/herbals. No change in alcohol use or tobacco use. No change in activity level. Patient denies headaches/falls. Increased fluid intake - had no dizziness since. No vomiting/diarrhea or acute illness. No procedures scheduled in the future at this time. Assessment:   Lab Results   Component Value Date    INR 2.40 (H) 08/11/2020    INR 2.61 (H) 06/30/2020    INR 2.47 (H) 05/05/2020     INR therapeutic - goal 2-3. Recent Labs     08/11/20  1508   INR 2.40*       Plan:  POCT INR ordered/performed/result reviewed. Continue Coumadin 4 mg daily PO. Recheck INR in 6 week(s). Patient reminded to call the Anticoagulation Clinic with any signs or symptoms of bleeding or with any medication changes. Patient given instructions utilizing the teach back method. Discharged ambulatory in no apparent distress, wearing mask. After visit summary printed and reviewed with patient.       Medications reviewed and updated on home medication list.    Influenza vaccine:     [] given    [] declined   [] received previously   [] plans to receive at a later time   [] refused    [] documented in EPIC

## 2020-09-08 ENCOUNTER — HOSPITAL ENCOUNTER (EMERGENCY)
Age: 85
Discharge: HOME | End: 2020-09-08
Attending: EMERGENCY MEDICINE
Payer: MEDICARE

## 2020-09-08 ENCOUNTER — APPOINTMENT (OUTPATIENT)
Dept: CT IMAGING | Age: 85
End: 2020-09-08
Payer: MEDICARE

## 2020-09-08 ENCOUNTER — APPOINTMENT (OUTPATIENT)
Dept: GENERAL RADIOLOGY | Age: 85
End: 2020-09-08
Payer: MEDICARE

## 2020-09-08 VITALS
TEMPERATURE: 98.2 F | DIASTOLIC BLOOD PRESSURE: 79 MMHG | OXYGEN SATURATION: 99 % | SYSTOLIC BLOOD PRESSURE: 153 MMHG | RESPIRATION RATE: 17 BRPM | WEIGHT: 170 LBS | HEART RATE: 89 BPM | BODY MASS INDEX: 27.44 KG/M2

## 2020-09-08 LAB
ALBUMIN SERPL-MCNC: 4 G/DL (ref 3.5–5.1)
ALP BLD-CCNC: 81 U/L (ref 38–126)
ALT SERPL-CCNC: 20 U/L (ref 11–66)
ANION GAP SERPL CALCULATED.3IONS-SCNC: 16 MEQ/L (ref 8–16)
AST SERPL-CCNC: 28 U/L (ref 5–40)
BASOPHILS # BLD: 0.4 %
BASOPHILS ABSOLUTE: 0.1 THOU/MM3 (ref 0–0.1)
BILIRUB SERPL-MCNC: 0.7 MG/DL (ref 0.3–1.2)
BUN BLDV-MCNC: 16 MG/DL (ref 7–22)
CALCIUM SERPL-MCNC: 9.2 MG/DL (ref 8.5–10.5)
CHLORIDE BLD-SCNC: 96 MEQ/L (ref 98–111)
CO2: 24 MEQ/L (ref 23–33)
CREAT SERPL-MCNC: 0.6 MG/DL (ref 0.4–1.2)
EOSINOPHIL # BLD: 0.2 %
EOSINOPHILS ABSOLUTE: 0 THOU/MM3 (ref 0–0.4)
ERYTHROCYTE [DISTWIDTH] IN BLOOD BY AUTOMATED COUNT: 13.2 % (ref 11.5–14.5)
ERYTHROCYTE [DISTWIDTH] IN BLOOD BY AUTOMATED COUNT: 43.1 FL (ref 35–45)
GFR SERPL CREATININE-BSD FRML MDRD: > 90 ML/MIN/1.73M2
GLUCOSE BLD-MCNC: 112 MG/DL (ref 70–108)
HCT VFR BLD CALC: 45.4 % (ref 37–47)
HEMOGLOBIN: 14.9 GM/DL (ref 12–16)
IMMATURE GRANS (ABS): 0.04 THOU/MM3 (ref 0–0.07)
IMMATURE GRANULOCYTES: 0.3 %
INR BLD: 2.3 (ref 0.85–1.13)
LACTIC ACID: 1.6 MMOL/L (ref 0.5–2.2)
LYMPHOCYTES # BLD: 34.6 %
LYMPHOCYTES ABSOLUTE: 4.5 THOU/MM3 (ref 1–4.8)
MCH RBC QN AUTO: 29.3 PG (ref 26–33)
MCHC RBC AUTO-ENTMCNC: 32.8 GM/DL (ref 32.2–35.5)
MCV RBC AUTO: 89.2 FL (ref 81–99)
MONOCYTES # BLD: 9.2 %
MONOCYTES ABSOLUTE: 1.2 THOU/MM3 (ref 0.4–1.3)
NUCLEATED RED BLOOD CELLS: 0 /100 WBC
OSMOLALITY CALCULATION: 273.9 MOSMOL/KG (ref 275–300)
PLATELET # BLD: 202 THOU/MM3 (ref 130–400)
PMV BLD AUTO: 11 FL (ref 9.4–12.4)
POTASSIUM REFLEX MAGNESIUM: 3.7 MEQ/L (ref 3.5–5.2)
RBC # BLD: 5.09 MILL/MM3 (ref 4.2–5.4)
SEG NEUTROPHILS: 55.3 %
SEGMENTED NEUTROPHILS ABSOLUTE COUNT: 7.2 THOU/MM3 (ref 1.8–7.7)
SODIUM BLD-SCNC: 136 MEQ/L (ref 135–145)
TOTAL PROTEIN: 7.4 G/DL (ref 6.1–8)
WBC # BLD: 13.1 THOU/MM3 (ref 4.8–10.8)

## 2020-09-08 PROCEDURE — 73610 X-RAY EXAM OF ANKLE: CPT

## 2020-09-08 PROCEDURE — 73630 X-RAY EXAM OF FOOT: CPT

## 2020-09-08 PROCEDURE — 99283 EMERGENCY DEPT VISIT LOW MDM: CPT

## 2020-09-08 PROCEDURE — 73521 X-RAY EXAM HIPS BI 2 VIEWS: CPT

## 2020-09-08 PROCEDURE — 6370000000 HC RX 637 (ALT 250 FOR IP): Performed by: STUDENT IN AN ORGANIZED HEALTH CARE EDUCATION/TRAINING PROGRAM

## 2020-09-08 PROCEDURE — 85610 PROTHROMBIN TIME: CPT

## 2020-09-08 PROCEDURE — 99282 EMERGENCY DEPT VISIT SF MDM: CPT

## 2020-09-08 PROCEDURE — 36415 COLL VENOUS BLD VENIPUNCTURE: CPT

## 2020-09-08 PROCEDURE — 83605 ASSAY OF LACTIC ACID: CPT

## 2020-09-08 PROCEDURE — 85025 COMPLETE CBC W/AUTO DIFF WBC: CPT

## 2020-09-08 PROCEDURE — 80053 COMPREHEN METABOLIC PANEL: CPT

## 2020-09-08 RX ORDER — AMOXICILLIN AND CLAVULANATE POTASSIUM 875; 125 MG/1; MG/1
1 TABLET, FILM COATED ORAL 2 TIMES DAILY
Qty: 20 TABLET | Refills: 0 | Status: SHIPPED | OUTPATIENT
Start: 2020-09-08 | End: 2020-09-18

## 2020-09-08 RX ORDER — AMOXICILLIN AND CLAVULANATE POTASSIUM 875; 125 MG/1; MG/1
1 TABLET, FILM COATED ORAL ONCE
Status: COMPLETED | OUTPATIENT
Start: 2020-09-08 | End: 2020-09-08

## 2020-09-08 RX ADMIN — AMOXICILLIN AND CLAVULANATE POTASSIUM 1 TABLET: 875; 125 TABLET, FILM COATED ORAL at 21:14

## 2020-09-08 ASSESSMENT — ENCOUNTER SYMPTOMS
SHORTNESS OF BREATH: 1
EYES NEGATIVE: 1
ALLERGIC/IMMUNOLOGIC NEGATIVE: 1
COLOR CHANGE: 1
BACK PAIN: 1
GASTROINTESTINAL NEGATIVE: 1
CHEST TIGHTNESS: 0

## 2020-09-08 ASSESSMENT — PAIN SCALES - GENERAL
PAINLEVEL_OUTOF10: 10
PAINLEVEL_OUTOF10: 7

## 2020-09-08 ASSESSMENT — PAIN DESCRIPTION - PAIN TYPE: TYPE: ACUTE PAIN

## 2020-09-08 ASSESSMENT — PAIN DESCRIPTION - ORIENTATION: ORIENTATION: LEFT

## 2020-09-08 ASSESSMENT — PAIN DESCRIPTION - FREQUENCY: FREQUENCY: CONTINUOUS

## 2020-09-08 ASSESSMENT — PAIN DESCRIPTION - DESCRIPTORS: DESCRIPTORS: ACHING

## 2020-09-08 NOTE — ED TRIAGE NOTES
Pt presents to ED c/c left foot pain/injury. Pt states she tripped and fell. Pt states she did hit her head but she has not problems with her head \"I'm concerned about my foot\". Pt is JAMARCUS, A&O x4, daughter at bedside.

## 2020-09-08 NOTE — ED PROVIDER NOTES
[facility]        CHIEF COMPLAINT  Chief Complaint   Patient presents with    Foot Injury     left       Nurses Notes reviewed and I agree except as noted in the HPI. HPI    Anusha Matta is a 80 y.o. female who presents for evaluation of left ankle pain s/p fall on the floor at home on Saturday afternoon on the left side of her body, also hitting her hips, head w/o LOC, was unable to get up and did not have a phone with her therefore remained on the floor until her daughter was reached the next day. Per daughter, even if the patient did not sustain her ankle injury, she would not be able to get up anyway due to baseline weakness in her lower extremities. Patient endorses pain on weightbearing, although can put some weight on it relying on her heel. There is notable tenderness to touch and erythema on left ankle, warm to touch. REVIEW OF SYSTEMS  Review of Systems   Constitutional: Positive for activity change. Negative for diaphoresis and fatigue. HENT: Negative. Eyes: Negative. Negative for visual disturbance. Respiratory: Positive for shortness of breath. Negative for chest tightness. Cardiovascular: Positive for leg swelling (Left ankle). Negative for chest pain and palpitations. Gastrointestinal: Negative. Endocrine: Negative. Genitourinary: Negative. Musculoskeletal: Positive for arthralgias, back pain and joint swelling (Left ankle). Skin: Positive for color change (Erythema over left ankle). Allergic/Immunologic: Negative. Neurological: Negative. Negative for seizures and headaches. Hematological: Negative. Psychiatric/Behavioral: Negative. Negative for confusion and hallucinations.        PAST MEDICAL HISTORY   has a past medical history of Arthritis, Blood circulation, collateral, Breathing difficulty, Chronic kidney disease, DVT (deep venous thrombosis) ([facility] [address] History of blood transfusion, Hx of blood clots, Hyperlipidemia, Hypertension, Pneumonia, Prolonged emergence from general anesthesia, Thyroid disease, and Unspecified diseases of blood and blood-forming organs. SURGICAL HISTORY   has a past surgical history that includes Varicose vein surgery (1969?? ); Vena Cava Filter Placement (2013); Colonoscopy; Hysterectomy (1983?? ); skin biopsy; and Cystocopy (8/18/15). CURRENT MEDICATIONS  Discharge Medication List as of 9/8/2020  8:51 PM      CONTINUE these medications which have NOT CHANGED    Details   diltiazem (DILACOR XR) 180 MG extended release capsule Take 2 capsules by mouth daily Indications: High Blood Pressure Disorder, Disp-180 capsule, R-4Normal      levothyroxine (SYNTHROID) 125 MCG tablet Take 1 tablet by mouth Daily Indications: Impaired Thyroid Function Pt wants only synthroid brand name  Takes [facility] only. 5-14-18., Disp-90 tablet, R-4Normal      atorvastatin (LIPITOR) 20 MG tablet Take 1 tablet by mouth every evening Indications: Blood Cholesterol Abnormal, Disp-90 tablet, R-4Normal      warfarin (COUMADIN) 2 MG tablet Take as directed by [facility]. 180 tabs = 90 days supply, Disp-180 tablet, R-3Normal      acetaminophen (TYLENOL) 500 MG tablet Take 500 mg by mouth every 6 hours as needed for Pain or Fever Don't take more than 3,000 mg each day. Historical Med      SF 5000 PLUS 1.1 % CREA Place onto teeth 2 times daily , DAWHistorical Med             ALLERGIES  is allergic to bactrim [sulfamethoxazole-trimethoprim]; tramadol; and codeine. FAMILY HISTORY  She indicated that the status of her mother is unknown. She indicated that the status of her father is unknown.   family history includes Cancer in her mother; Heart Disease in her father. SOCIAL HISTORY   reports that she has never smoked. She has never used smokeless tobacco. She reports that she does not drink alcohol or use drugs.     PHYSICAL EXAM     INITIAL VITALS: BP (!) 153/79   Pulse 89   Temp 98.2 °F (36.8 °C) (Oral)   Resp 17   Wt 170 lb (77.1 kg)   SpO2 99%   BMI 27.44 kg/m² Estimated body mass index is 27.44 kg/m² as calculated from the following:    Height as of 3/20/20: 5' 6\" (1.676 m). Weight as of this encounter: 170 lb (77.1 kg). Physical Exam  Vitals signs and nursing note reviewed. Constitutional:       Appearance: Normal appearance. She is normal weight. HENT:      Head: Normocephalic and atraumatic. Right Ear: External ear normal.      Left Ear: External ear normal.      Nose: Nose normal.      Mouth/Throat:      Mouth: Mucous membranes are moist.   Eyes:      Extraocular Movements: Extraocular movements intact. Neck:      Musculoskeletal: Neck supple. Cardiovascular:      Rate and Rhythm: Normal rate and regular rhythm. Heart sounds: Normal heart sounds. No friction rub. Pulmonary:      Effort: Pulmonary effort is normal. No respiratory distress. Breath sounds: Normal breath sounds. No wheezing. Abdominal:      General: Abdomen is flat. Tenderness: There is no abdominal tenderness. Musculoskeletal:         General: Swelling (Left ankle), tenderness and signs of injury present. No deformity. Left lower leg: She exhibits no tenderness and no swelling. Left foot: Normal range of motion. Tenderness and swelling present. No crepitus, deformity or laceration. Feet:    Skin:     General: Skin is warm. Findings: Erythema (Left ankle) present. Neurological:      General: No focal deficit present. Mental Status: She is alert and oriented to person, place, and time. Psychiatric:         Mood and Affect: Mood normal.         Behavior: Behavior normal.         MEDICAL DECISION MAKING  During this hospital stay the patient had worsening pain to her left ankle, increased erythema, which was tender to touch. Imaging of the left ankle and hips was obtained without any evidence of fractures/acute changes.   CT of the head had no acute findings no acute intracranial hemorrhage. Patient has a history of multiple clots and is taking Coumadin( INR 2.3). After her initial injury she likely had a hematoma within the tissues of the L ankle, which caused swelling and increased pressure from the bleed, resulting in skin stretching and tension which provided nidus for infection, resulted in superimposed cellulitis and lymphedema. Initial assessment: Superimposed cellulitis with lymphedema secondary to ankle swelling resulting in skin tension/abrasion from increased ankle pressure likely 2/2 hematoma w/in soft tissue of the L ankle after initial insult in the setting of Coumadin use    Plan: 10-day course of Augmentin twice daily  Elevate the leg above the level with a hard with at least 2 pillows  Intermittent wrapping of left ankle to reduce the swelling, and stabilizing the joint      DIFFERENTIAL DIAGNOSIS:  Lymphedema      DIAGNOSTIC RESULTS    RADIOLOGY:  I have reviewed radiologic plain film image(s). The plain films will be read or over read by the radiologist.All other non-plain film images(s) such as CT, Ultrasound and MRI have been read by the radiologist.  XR Hips Bilateral   Final Result    IMPRESSION:   No acute fracture or dislocation. **This report has been created using voice recognition software. It may contain minor errors which are inherent in voice recognition technology. **      Final report electronically signed by Dr. Amira Gautam on 9/8/2020 7:03 PM      XR FOOT LEFT (MIN 3 VIEWS)   Final Result    IMPRESSION:   No acute fracture or dislocation. **This report has been created using voice recognition software. It may contain minor errors which are inherent in voice recognition technology. **      Final report electronically signed by Dr. Amira Gautam on 9/8/2020 6:58 PM      XR ANKLE LEFT (MIN 3 VIEWS)   Final Result   No acute fracture or dislocation.  Soft tissue swelling over the lateral malleolus. **This report has been created using voice recognition software. It may contain minor errors which are inherent in voice recognition technology. **      Final report electronically signed by Dr. Paulina Barajas on 9/8/2020 6:49 PM      CT HEAD WO CONTRAST    (Results Pending)       LABS:  Labs Reviewed   CBC WITH AUTO DIFFERENTIAL - Abnormal; Notable for the following components:       Result Value    WBC 13.1 (*)     All other components within normal limits   COMPREHENSIVE METABOLIC PANEL W/ REFLEX TO MG FOR LOW K - Abnormal; Notable for the following components:    Glucose 112 (*)     Chloride 96 (*)     All other components within normal limits   PROTIME-INR - Abnormal; Notable for the following components:    INR 2.30 (*)     All other components within normal limits   OSMOLALITY - Abnormal; Notable for the following components:    Osmolality Calc 273.9 (*)     All other components within normal limits   LACTIC ACID, PLASMA   ANION GAP   GLOMERULAR FILTRATION RATE, ESTIMATED     All other unresulted laboratory test above are normal:    Vitals:    Vitals:    09/08/20 1741   BP: (!) 153/79   Pulse: 89   Resp: 17   Temp: 98.2 °F (36.8 °C)   TempSrc: Oral   SpO2: 99%   Weight: 170 lb (77.1 kg)       EMERGENCY DEPARTMENT COURSE:    Medications   amoxicillin-clavulanate (AUGMENTIN) 875-125 MG per tablet 1 tablet (1 tablet Oral Given 9/8/20 2114)       ED Course as of Sep 09 0146   Tue Sep 08, 2020   2027 Region results were discussed with the patient. On clinical exam findings are most consistent with initial injury that led to subcuticular bleed that resulted in extraction of the skin, which superimposed infection, and lymphedema which followed. [VK]      ED Course User Index  [VK] Pamela Negron MD       Controlled Substances Monitoring:  Periodic Controlled Substance Monitoring: No signs of potential drug abuse or diversion identified.  Pamela Negron MD)    CRITICAL CARE:  None. CONSULTS:  None. PROCEDURES:  None. FINAL IMPRESSION:  1. Cellulitis of left ankle        DISPOSITION/PLAN:  PATIENT REFERRED TO:  No follow-up provider specified. DISCHARGE MEDICATIONS:  Discharge Medication List as of 9/8/2020  8:51 PM      START taking these medications    Details   amoxicillin-clavulanate (AUGMENTIN) 875-125 MG per tablet Take 1 tablet by mouth 2 times daily for 10 days, Disp-20 tablet,R-0Print               (Please note that portions of this note were completed with a voice recognition program and electronically transcribed. Efforts were made to edit the dictations but occasionally words are mis-transcribed. This transcription may contain errors not detected in proofreading.  This transcription was electronically signed.)    Electronically signed by Saintclair Hearing, MD on 9/9/2020 at 1:46 AM     Saintclair Hearing, MD  Resident  09/09/20 6838

## 2020-09-09 NOTE — ED PROVIDER NOTES
Pt Name: Cristian Christiansen  MRN: [id number]  YOB: 1934  Date of evaluation: 9/8/2020  Supervising Physician: Norberto Doll MD    I personally saw and examined the patient. I have reviewed and agreed with the resident physician's findings including all diagnostic interpretations and treatment plans as written. Please see resident physician's chart for details. I was present for the key portions of any procedures performed and the inclusive time noted in any critical care statement. Briefly this is a 80 y.o. female present to ED c/o Foot Injury (left)    54-year-old female presented to ED complaining left ankle pain and swelling status post fall which happened at home 4 days ago. Patient now has diffuse swelling with ecchymosis from the left foot also she noticed there is a streak climbing up to left lower leg. Pain was moderate to severe. Pain was worse on weightbearing. Swelling was moderate. She has been using Ace wraps. No fever no chills. She is taking warfarin due to history of DVT. PHYSICIAN EXAM  VITALS  Vitals:    09/08/20 1741   BP: (!) 153/79   Pulse: 89   Resp: 17   Temp: 98.2 °F (36.8 °C)   TempSrc: Oral   SpO2: 99%   Weight: 170 lb (77.1 kg)       LABS  Results for orders placed or performed during the hospital encounter of 09/08/20   CBC auto differential   Result Value Ref Range    WBC 13.1 (H) 4.8 - 10.8 thou/mm3    RBC 5.09 4. 20 - 5.40 mill/mm3    Hemoglobin 14.9 12.0 - 16.0 gm/dl    Hematocrit 45.4 37.0 - 47.0 %    MCV 89.2 81.0 - 99.0 fL    MCH 29.3 26.0 - 33.0 pg    MCHC 32.8 32.2 - 35.5 gm/dl    RDW-CV 13.2 11.5 - 14.5 %    RDW-SD 43.1 35.0 - 45.0 fL    Platelets 526 795 - 130 thou/mm3    MPV 11.0 9.4 - 12.4 fL    Seg Neutrophils 55.3 %    Lymphocytes 34.6 %    Monocytes 9.2 %    Eosinophils 0.2 %    Basophils 0.4 %    Immature Granulocytes 0.3 %    Segs Absolute 7.2 1.8 - 7.7 thou/mm3    Lymphocytes Absolute 4.5 1.0 - 4.8 thou/mm3    Monocytes Absolute 1.2 0.4 - 1.3 thou/mm3 Eosinophils Absolute 0.0 0.0 - 0.4 thou/mm3    Basophils Absolute 0.1 0.0 - 0.1 thou/mm3    Immature Grans (Abs) 0.04 0.00 - 0.07 thou/mm3    nRBC 0 /100 wbc   Comprehensive Metabolic Panel w/ Reflex to MG   Result Value Ref Range    Glucose 112 (H) 70 - 108 mg/dL    CREATININE 0.6 0.4 - 1.2 mg/dL    BUN 16 7 - 22 mg/dL    Sodium 136 135 - 145 meq/L    Potassium reflex Magnesium 3.7 3.5 - 5.2 meq/L    Chloride 96 (L) 98 - 111 meq/L    CO2 24 23 - 33 meq/L    Calcium 9.2 8.5 - 10.5 mg/dL    AST 28 5 - 40 U/L    Alkaline Phosphatase 81 38 - 126 U/L    Total Protein 7.4 6.1 - 8.0 g/dL    Alb 4.0 3.5 - 5.1 g/dL    Total Bilirubin 0.7 0.3 - 1.2 mg/dL    ALT 20 11 - 66 U/L   Lactic Acid, Plasma   Result Value Ref Range    Lactic Acid 1.6 0.5 - 2.2 mmol/L   Protime-INR   Result Value Ref Range    INR 2.30 (H) 0.85 - 1.13   Anion Gap   Result Value Ref Range    Anion Gap 16.0 8.0 - 16.0 meq/L   Glomerular Filtration Rate, Estimated   Result Value Ref Range    Est, Glom Filt Rate >90 ml/min/1.73m2   Osmolality   Result Value Ref Range    Osmolality Calc 273.9 (L) 275.0 - 300.0 mOsmol/kg       IMAGING STUDIES  XR Hips Bilateral   Final Result    IMPRESSION:   No acute fracture or dislocation. **This report has been created using voice recognition software. It may contain minor errors which are inherent in voice recognition technology. **      Final report electronically signed by Dr. Mark Lainez on 9/8/2020 7:03 PM      XR FOOT LEFT (MIN 3 VIEWS)   Final Result    IMPRESSION:   No acute fracture or dislocation. **This report has been created using voice recognition software. It may contain minor errors which are inherent in voice recognition technology. **      Final report electronically signed by Dr. Mark Lainez on 9/8/2020 6:58 PM      XR ANKLE LEFT (MIN 3 VIEWS)   Final Result   No acute fracture or dislocation. Soft tissue swelling over the lateral malleolus.          **This report has been created using voice recognition software. It may contain minor errors which are inherent in voice recognition technology. **      Final report electronically signed by Dr. Chuckie Whitfield on 9/8/2020 6:49 PM      CT HEAD WO CONTRAST    (Results Pending)       ED MEDICATIONS  Medications   amoxicillin-clavulanate (AUGMENTIN) 875-125 MG per tablet 1 tablet (1 tablet Oral Given 9/8/20 2114)         [address]    Patient was seen and evaluated by resident physician initially. Left foot and ankle did not reveal acute osseous abnormality. Clinical findings were consistent with ecchymosis from fall and secondary cellulitis and lymphangitis. Patient was nontoxic looking. I suggested a course of Augmentin treatment. Discharged with Augmentin prescription. Followed by PCP in 3-5 days. DIAGNOSIS  1.  Cellulitis of left ankle        DISPOSITION and [address] Way M.D.       Janelle Kaye MD  09/09/20 5411

## 2020-09-16 ENCOUNTER — TELEPHONE (OUTPATIENT)
Dept: WOUND CARE | Age: 85
End: 2020-09-16

## 2020-09-16 ENCOUNTER — OFFICE VISIT (OUTPATIENT)
Dept: FAMILY MEDICINE CLINIC | Age: 85
End: 2020-09-16
Payer: MEDICARE

## 2020-09-16 VITALS
WEIGHT: 163.4 LBS | RESPIRATION RATE: 14 BRPM | OXYGEN SATURATION: 96 % | TEMPERATURE: 97.7 F | HEIGHT: 64 IN | SYSTOLIC BLOOD PRESSURE: 127 MMHG | HEART RATE: 74 BPM | BODY MASS INDEX: 27.9 KG/M2 | DIASTOLIC BLOOD PRESSURE: 71 MMHG

## 2020-09-16 PROCEDURE — 99214 OFFICE O/P EST MOD 30 MIN: CPT | Performed by: NURSE PRACTITIONER

## 2020-09-16 PROCEDURE — 96372 THER/PROPH/DIAG INJ SC/IM: CPT | Performed by: NURSE PRACTITIONER

## 2020-09-16 RX ORDER — CEFTRIAXONE 500 MG/1
1000 INJECTION, POWDER, FOR SOLUTION INTRAMUSCULAR; INTRAVENOUS ONCE
Status: COMPLETED | OUTPATIENT
Start: 2020-09-16 | End: 2020-09-16

## 2020-09-16 RX ORDER — AMOXICILLIN AND CLAVULANATE POTASSIUM 875; 125 MG/1; MG/1
1 TABLET, FILM COATED ORAL 2 TIMES DAILY
Qty: 20 TABLET | Refills: 0 | Status: SHIPPED | OUTPATIENT
Start: 2020-09-16 | End: 2020-09-26

## 2020-09-16 RX ADMIN — CEFTRIAXONE 1000 MG: 500 INJECTION, POWDER, FOR SOLUTION INTRAMUSCULAR; INTRAVENOUS at 14:55

## 2020-09-16 ASSESSMENT — PATIENT HEALTH QUESTIONNAIRE - PHQ9
SUM OF ALL RESPONSES TO PHQ QUESTIONS 1-9: 0
1. LITTLE INTEREST OR PLEASURE IN DOING THINGS: 0
SUM OF ALL RESPONSES TO PHQ QUESTIONS 1-9: 0
SUM OF ALL RESPONSES TO PHQ9 QUESTIONS 1 & 2: 0
2. FEELING DOWN, DEPRESSED OR HOPELESS: 0

## 2020-09-16 ASSESSMENT — ENCOUNTER SYMPTOMS
ABDOMINAL DISTENTION: 0
RESPIRATORY NEGATIVE: 1
COLOR CHANGE: 1
ABDOMINAL PAIN: 0

## 2020-09-16 NOTE — PROGRESS NOTES
Jose Elias Coronado  MEDICINE  [address] [place] [address]  Dept: [phone]  Dept Fax: [phone]  Loc: [phone]    Tone Luis is a 80 y.o. femalewho presents today for her medical conditions/complaints as noted below. Emma Barriga c/o of ED Follow-up [facility] 9/8 cellulitis on left foot/ankle, redness, swelling, painful, atb helping, wants to know if she should be on more )      HPI:      Pt went to ER on September 8th for a red warm painful foot. She did have an elevated white count of 13 and normal lactic acid. She was given augmentin which she continues but states the redness and swelling have improved but only has 2 days of atb left and does not think it will all clear up. She denies a fever chills or sweats. States she had been wearing a shoe that had rubbed the skin of her left foot. States the foot aches but denies numbness or tingling. Does hurt 4/10. She denies LLE swelling, has had a DVT in past bu ton prophylaxis for this. Lab Results       Component                Value               Date                       WBC                      13.1 (H)            09/08/2020                 HGB                      14.9                09/08/2020                 HCT                      45.4                09/08/2020                 MCV                      89.2                09/08/2020                 PLT                      202                 09/08/2020            Lab Results       Component                Value               Date                       LACTA                    1.6                 09/08/2020            Pt stated a fall precipitated her foot hurting. She did have normal x-rays in ER of both feet. Did not injure her head during the fall.           Current Outpatient Medications   Medication Sig Dispense Refill    amoxicillin-clavulanate (AUGMENTIN) 875-125 MG per tablet Take 1 tablet by mouth 2 times daily for 10 days 20 tablet 0    amoxicillin-clavulanate (AUGMENTIN) 875-125 MG per tablet Take 1 tablet by mouth 2 times daily for 10 days 20 tablet 0    diltiazem (DILACOR XR) 180 MG extended release capsule Take 2 capsules by mouth daily Indications: High Blood Pressure Disorder 180 capsule 4    levothyroxine (SYNTHROID) 125 MCG tablet Take 1 tablet by mouth Daily Indications: Impaired Thyroid Function Pt wants only synthroid brand name  Takes [facility] only. 5-14-18. 90 tablet 4    atorvastatin (LIPITOR) 20 MG tablet Take 1 tablet by mouth every evening Indications: Blood Cholesterol Abnormal 90 tablet 4    warfarin (COUMADIN) 2 MG tablet Take as directed by [facility]. 180 tabs = 90 days supply (Patient taking differently: Take as directed by [id number] 180 tabs = 90 days supply    Pt takes 4 mg every day) 180 tablet 3    acetaminophen (TYLENOL) 500 MG tablet Take 500 mg by mouth every 6 hours as needed for Pain or Fever Don't take more than 3,000 mg each day.  SF 5000 PLUS 1.1 % CREA Place onto teeth 2 times daily        No current facility-administered medications for this visit. Past Medical History:   Diagnosis Date    Arthritis     wrist, back, neck, foot    Blood circulation, collateral     Breathing difficulty     Chronic kidney disease     DVT (deep venous thrombosis) (HCC)     History of blood transfusion 1960's    child birth    Hx of blood clots 2013? ??    left leg    Hyperlipidemia     Hypertension     Pneumonia     Prolonged emergence from general anesthesia     with hysterectomy? ?    Thyroid disease     Unspecified diseases of blood and blood-forming organs       Past Surgical History:   Procedure Laterality Date    COLONOSCOPY      last one before 2005???    CYSTOSCOPY  8/18/15    HYDRODISTENTION, RPG, LEFT STENT    HYSTERECTOMY  1983??    SKIN BIOPSY      VARICOSE VEIN SURGERY  1969??    VENA CAVA FILTER PLACEMENT  2013     Family History   Problem Relation Age of Onset    Cancer Mother     Heart Disease Father      Social History     Tobacco Use    Smoking status: Never Smoker    Smokeless tobacco: Never Used   Substance Use Topics    Alcohol use: No        Allergies   Allergen Reactions    Bactrim [Sulfamethoxazole-Trimethoprim] Nausea Only    Tramadol Other (See Comments)     Patient felt \"not right\"    Codeine Nausea And Vomiting       Health Maintenance   Topic Date Due    DTaP/Tdap/Td vaccine (1 - Tdap) 03/01/1953    Shingles Vaccine (1 of 2) 03/01/1984    Pneumococcal 65+ years Vaccine (1 of 1 - PPSV23) 03/01/1999    Annual Wellness Visit (AWV)  06/23/2019    Flu vaccine (1) 09/01/2020    Lipid screen  12/20/2020    TSH testing  02/04/2021    Hepatitis A vaccine  Aged Out    Hepatitis B vaccine  Aged Out    Hib vaccine  Aged Out    Meningococcal (ACWY) vaccine  Aged Out       Subjective:      Review of Systems   Constitutional: Negative for fatigue and fever. HENT: Negative for congestion. Respiratory: Negative. Cardiovascular: Negative. Gastrointestinal: Negative for abdominal distention and abdominal pain. Musculoskeletal: Positive for arthralgias, gait problem, joint swelling and myalgias. Skin: Positive for color change. Neurological: Positive for weakness (left foot). Negative for dizziness and headaches. Objective:      /71   Pulse 74   Temp 97.7 °F (36.5 °C) (Temporal)   Resp 14   Ht 5' 4\" (1.626 m)   Wt 163 lb 6.4 oz (74.1 kg)   SpO2 96%   BMI 28.05 kg/m²      Physical Exam  Vitals signs and nursing note reviewed. Constitutional:       Appearance: She is not ill-appearing. Cardiovascular:      Rate and Rhythm: Normal rate and regular rhythm. Pulses: Normal pulses. Heart sounds: Normal heart sounds. Pulmonary:      Effort: Pulmonary effort is normal.      Breath sounds: Normal breath sounds.    Musculoskeletal:        Feet:    Feet:      Comments: Left foot with anterior erythema, the midfoot section is warm to touch and painful to touch. Cap  refiill 2+, pt withht positive sensation to left foot. Left mid foot slightly edematous. Negative gladis's left leg, no pain with palpation of left lower calf. Left toes with erythema at bases and toes slightly swollen but not red. Skin:     Capillary Refill: Capillary refill takes less than 2 seconds. Findings: Erythema present. Neurological:      Mental Status: She is alert. Assessment/Plan:           1. Cellulitis of foot  Rocephin 1 gm given in addition to finishing augmentin  Another script of augmentin given. Await repeat labs. May need to send back to ER with cbc and lactic acid have risen. Pt in agreement with plan. - CBC With Auto Differential; Future  - Lactic Acid, Plasma; Future  - NOLBERTO Rankin MD, Infectious Disease, [facility]    2. At high risk for falls  Pt needs to use a walker when ambulating for stability. 3. Leukocytosis, unspecified type  1 gm rocephin IM in office  Continue atb  ER precautions given to her if needed  Pt  Voiced understanding and agreement  Keep foot elevated when able. - CBC With Auto Differential; Future  - Lactic Acid, Plasma; Future  - NOLBERTO - Noble Rankin MD, Infectious Disease, [facility]      Return in about 6 days (around 9/22/2020) for with Ronak Alberts for celulitis foot . Reccommended tobaccocessation options including pharmacologic methods, counseled great than 3 minutesduring this visit:  Yes[]  No  []       Patient given educational materials -see patient instructions. Discussed use, benefit, and side effects of prescribedmedications. All patient questions answered. Pt voiced understanding. Reviewedhealth maintenance. Instructed to continue current medications, diet and exercise. Patient agreed with treatment plan. Follow up as directed.        Electronicallysigned by LISA Arthur - CNP on 9/16/2020 at 3:20 PM        On the basis of positive falls risk screening, assessment and plan is as follows: in-office gait and balance testing performed using The 30 Second Chair Stand Test was negative for increased falls risk- no further intervention is currently indicated, home safety tips provided.

## 2020-09-17 ENCOUNTER — TELEPHONE (OUTPATIENT)
Dept: FAMILY MEDICINE CLINIC | Age: 85
End: 2020-09-17

## 2020-09-17 ENCOUNTER — NURSE ONLY (OUTPATIENT)
Dept: LAB | Age: 85
End: 2020-09-17

## 2020-09-17 LAB
ATYPICAL LYMPHOCYTES: ABNORMAL %
BASOPHILS # BLD: 0.6 %
BASOPHILS ABSOLUTE: 0.1 THOU/MM3 (ref 0–0.1)
EOSINOPHIL # BLD: 1.6 %
EOSINOPHILS ABSOLUTE: 0.2 THOU/MM3 (ref 0–0.4)
ERYTHROCYTE [DISTWIDTH] IN BLOOD BY AUTOMATED COUNT: 13.2 % (ref 11.5–14.5)
ERYTHROCYTE [DISTWIDTH] IN BLOOD BY AUTOMATED COUNT: 44.8 FL (ref 35–45)
HCT VFR BLD CALC: 45 % (ref 37–47)
HEMOGLOBIN: 14.3 GM/DL (ref 12–16)
IMMATURE GRANS (ABS): 0.02 THOU/MM3 (ref 0–0.07)
IMMATURE GRANULOCYTES: 0.2 %
LACTIC ACID: 1.9 MMOL/L (ref 0.5–2.2)
LYMPHOCYTES # BLD: 49.6 %
LYMPHOCYTES ABSOLUTE: 5.3 THOU/MM3 (ref 1–4.8)
MCH RBC QN AUTO: 29.4 PG (ref 26–33)
MCHC RBC AUTO-ENTMCNC: 31.8 GM/DL (ref 32.2–35.5)
MCV RBC AUTO: 92.4 FL (ref 81–99)
MONOCYTES # BLD: 6.6 %
MONOCYTES ABSOLUTE: 0.7 THOU/MM3 (ref 0.4–1.3)
NUCLEATED RED BLOOD CELLS: 0 /100 WBC
PLATELET # BLD: 251 THOU/MM3 (ref 130–400)
PLATELET ESTIMATE: ADEQUATE
PMV BLD AUTO: 11 FL (ref 9.4–12.4)
RBC # BLD: 4.87 MILL/MM3 (ref 4.2–5.4)
SCAN OF BLOOD SMEAR: NORMAL
SEG NEUTROPHILS: 41.4 %
SEGMENTED NEUTROPHILS ABSOLUTE COUNT: 4.4 THOU/MM3 (ref 1.8–7.7)
WBC # BLD: 10.6 THOU/MM3 (ref 4.8–10.8)

## 2020-09-21 ENCOUNTER — TELEPHONE (OUTPATIENT)
Dept: FAMILY MEDICINE CLINIC | Age: 85
End: 2020-09-21

## 2020-09-21 ENCOUNTER — TELEPHONE (OUTPATIENT)
Dept: WOUND CARE | Age: 85
End: 2020-09-21

## 2020-09-21 NOTE — TELEPHONE ENCOUNTER
EXTERNAL REFERRAL TO INFECTIOUS DISEASE-   On 9/16/202 - Dr Margo Farah ofc was contacted & per their instruction the wound clinic was contacted at [phone]. Per the wound clinic they could see/pull the referral & will contact the pt to schedule. They did not need any info faxed to their ofc as they stated they can pull whatever they need from the pt's chart. On 9/21/2020 (today), left a detailed message of background to this referral (above notes) and asked for a c/b to clinical staff to let them know what is being done to get the patient in for an appointment. Later in the day:  Per Nadja Robert, attempts to schedule were done on Thursday and today. Both times messages were left for patient to call back. Nadja Robert states that this is documented in a telephone encounter in the patient's chart.

## 2020-09-22 ENCOUNTER — APPOINTMENT (OUTPATIENT)
Dept: PHARMACY | Age: 85
End: 2020-09-22
Payer: MEDICARE

## 2020-09-22 ENCOUNTER — OFFICE VISIT (OUTPATIENT)
Dept: FAMILY MEDICINE CLINIC | Age: 85
End: 2020-09-22
Payer: MEDICARE

## 2020-09-22 VITALS
TEMPERATURE: 98.4 F | SYSTOLIC BLOOD PRESSURE: 138 MMHG | WEIGHT: 163.4 LBS | HEART RATE: 66 BPM | BODY MASS INDEX: 28.05 KG/M2 | RESPIRATION RATE: 16 BRPM | DIASTOLIC BLOOD PRESSURE: 78 MMHG

## 2020-09-22 PROCEDURE — 99213 OFFICE O/P EST LOW 20 MIN: CPT | Performed by: NURSE PRACTITIONER

## 2020-09-22 NOTE — PROGRESS NOTES
Emma Mesa  is a 80 y.o. y/o female that presents for Cellulitis (follow from cellulitis- loooking better)      Rash    HPI:    Length of time Sx have been present - since about 9/8/20  Rash has gotten better since initially starting  Affected areas - left ankle  Inciting events or exposures prior to rash starting? No  Pruritic? Yes  Erythematous? Yes  Weeping or drainage? No  History of Urticaria? No  Fever? No  Painful? No    Review of Systems - General ROS: negative for - chills, fever or night sweats  Respiratory ROS: negative for - shortness of breath, stridor or wheezing    She notes that in the morning the redness is much improved. As the day progresses the redness worsens. She has been wearing compression hose and this does also help. Overall, the swelling and redness is much improved. No fever. OBJECTIVE:  /78   Pulse 66   Temp 98.4 °F (36.9 °C)   Resp 16   Wt 163 lb 6.4 oz (74.1 kg)   BMI 28.05 kg/m²   She appears well; non-toxic and in no apparent distress. Mouth - mucous membranes moist, pharynx normal without lesions  Chest - clear to auscultation, no wheezes, rales or rhonchi, symmetric air entry  Heart - normal rate, regular rhythm, normal S1, S2, no murmurs, rubs, clicks or gallops  Extremities - no pedal edema noted, intact peripheral pulses  Skin - DERMATITIS NOTED: erythematous dermatitis left foot and ankle, mild erythema, minimal swelling, some dry cracked some              ASSESSMENT & PLAN  Emma was seen today for cellulitis. Diagnoses and all orders for this visit:    Cellulitis of foot    Venous stasis dermatitis of left lower extremity  -     betamethasone valerate (VALISONE) 0.1 % cream; Apply topically 2 times daily.       - complete course of Augmentin  - I think there is a component of venous stasis dermatitis as well, will benefit from topical steroid cream  - overall improvement  - con't elevated and compression hose  - f/u with wound center if still no improvement     Return if symptoms worsen or fail to improve. Emma received counseling on the following healthy behaviors: medication adherence  Reviewed prior labs and health maintenance. Continue current medications, diet and exercise. Discussed use, benefit, and side effects of prescribed medications. Barriers to medication compliance addressed. Patient given educational materials - see patient instructions. All patient questions answered. Patient voiced understanding.

## 2020-09-23 ENCOUNTER — TELEPHONE (OUTPATIENT)
Dept: WOUND CARE | Age: 85
End: 2020-09-23

## 2020-09-23 NOTE — TELEPHONE ENCOUNTER
----- Message from Heraclio Douglas RN sent at 9/23/2020  9:03 AM EDT -----  Regarding: new referral  Patients daughter called and stated they are trying a new treatment and not sure that they will need us but would like a call.  Daughters # [phone]

## 2020-09-29 ENCOUNTER — HOSPITAL ENCOUNTER (OUTPATIENT)
Dept: PHARMACY | Age: 85
Setting detail: THERAPIES SERIES
Discharge: HOME | End: 2020-09-29
Payer: MEDICARE

## 2020-09-29 VITALS — TEMPERATURE: 95 F

## 2020-09-29 LAB — POC INR: 2.1 (ref 0.8–1.2)

## 2020-09-29 PROCEDURE — 85610 PROTHROMBIN TIME: CPT | Performed by: PHARMACIST

## 2020-09-29 PROCEDURE — 36416 COLLJ CAPILLARY BLOOD SPEC: CPT | Performed by: PHARMACIST

## 2020-09-29 PROCEDURE — 99211 OFF/OP EST MAY X REQ PHY/QHP: CPT | Performed by: PHARMACIST

## 2020-10-05 ENCOUNTER — TELEPHONE (OUTPATIENT)
Dept: FAMILY MEDICINE CLINIC | Age: 85
End: 2020-10-05

## 2020-10-05 NOTE — TELEPHONE ENCOUNTER
Aysha Valverde @ [facility] wound clinic calling to let you know pt declined referral

## 2020-11-10 ENCOUNTER — HOSPITAL ENCOUNTER (OUTPATIENT)
Dept: PHARMACY | Age: 85
Setting detail: THERAPIES SERIES
Discharge: HOME | End: 2020-11-10
Payer: MEDICARE

## 2020-11-10 VITALS — TEMPERATURE: 98.2 F

## 2020-11-10 LAB — POC INR: 2.2 (ref 0.8–1.2)

## 2020-11-10 PROCEDURE — 36416 COLLJ CAPILLARY BLOOD SPEC: CPT | Performed by: PHARMACIST

## 2020-11-10 PROCEDURE — 99211 OFF/OP EST MAY X REQ PHY/QHP: CPT | Performed by: PHARMACIST

## 2020-11-10 PROCEDURE — 85610 PROTHROMBIN TIME: CPT | Performed by: PHARMACIST

## 2020-11-10 NOTE — PROGRESS NOTES
Medication Management [address]  [phone] (phone)  [phone] (fax)      Ms. Hemanth Smith is a 80 y.o.  female with history of PE who presents today for anticoagulation monitoring and adjustment. Patient verifies current dosing regimen and tablet strength. She thinks she missed a dose about 2 weeks ago. Patient denies s/s bleeding/bruising/swelling/SOB/chest pain  No blood in urine or stool. Patient had eaten a beet and a few other things she knows she's not supposed to eat, but not very much. No changes in medication/OTC agents/Herbals. No change in alcohol use or tobacco use. Patient thinks she is more active than usual.  Patient denies headaches/dizziness/lightheadedness/falls. No vomiting/diarrhea or acute illness. No Procedures scheduled in the future at this time. Assessment:   Lab Results   Component Value Date    INR 2.20 (H) 11/10/2020    INR 2.10 (H) 09/29/2020    INR 2.30 (H) 09/08/2020     INR therapeutic   Recent Labs     11/10/20  1519   INR 2.20*     Patient interview completed and discussed with pharmacist by Hunter Trejo PharmD candidate    6th consecutive therapeutic INR result. Plan:  Continue Coumadin 4mg daily. Recheck INR in 6 week(s). Patient reminded to call the Anticoagulation Clinic with any signs or symptoms of bleeding or with any medication changes. Patient given instructions utilizing the teach back method. Discharged ambulatory in no apparent distress. After visit summary printed and reviewed with patient.       Medications reviewed and updated on home medication list Yes    Influenza vaccine:     [] given    [x] declined   [] received previously   [] plans to receive at a later time   [] refused    [] documented in [address]: MED [address]: No  Total # of Interventions Recommended: 0  - Maintenance Safety Lab Monitoring #: 1  Total Interventions Accepted: 0  Time Spent (min): 20    Farrel Saint, PharmD 11/10/2020 3:50 PM

## 2020-11-30 ENCOUNTER — TELEPHONE (OUTPATIENT)
Dept: PHARMACY | Age: 85
End: 2020-11-30

## 2020-11-30 NOTE — TELEPHONE ENCOUNTER
Patient informed clinic that eye procedure is on 12/3 and was not instructed to hold warfarin. Procedure is v. low risk for bleeding. Patient to continue Coumadin 4mg daily and follow-up at previously scheduled appt.     Electronically signed by JEN Keyes [facility] on 11/30/2020 at 1:04 PM

## 2020-11-30 NOTE — TELEPHONE ENCOUNTER
----- Message from Udorse sent at 11/30/2020 12:06 PM EST -----  Contact: [phone]  Emma said the last time she was in the clinic, she knew that she was going to have an eye procedure, but not sure of the date. She said [facility] wanted her to let us know and she also wants to know if she needs to do anything with the coumadin.  Please call her

## 2020-12-19 RX ORDER — WARFARIN SODIUM 2 MG/1
TABLET ORAL
Qty: 180 TABLET | Refills: 0 | Status: SHIPPED | OUTPATIENT
Start: 2020-12-19 | End: 2021-03-15 | Stop reason: SDUPTHER

## 2020-12-28 ENCOUNTER — HOSPITAL ENCOUNTER (OUTPATIENT)
Dept: PHARMACY | Age: 85
Setting detail: THERAPIES SERIES
Discharge: HOME | End: 2020-12-28
Payer: MEDICARE

## 2020-12-28 VITALS — TEMPERATURE: 97.3 F

## 2020-12-28 LAB — POC INR: 1.8 (ref 0.8–1.2)

## 2020-12-28 PROCEDURE — 36416 COLLJ CAPILLARY BLOOD SPEC: CPT

## 2020-12-28 PROCEDURE — 85610 PROTHROMBIN TIME: CPT

## 2020-12-28 PROCEDURE — 99211 OFF/OP EST MAY X REQ PHY/QHP: CPT

## 2020-12-28 RX ORDER — OFLOXACIN 3 MG/ML
SOLUTION/ DROPS OPHTHALMIC
COMMUNITY
Start: 2020-11-16 | End: 2021-01-18 | Stop reason: ALTCHOICE

## 2020-12-28 RX ORDER — PREDNISOLONE ACETATE 10 MG/ML
SUSPENSION/ DROPS OPHTHALMIC
COMMUNITY
Start: 2020-11-16 | End: 2021-01-18 | Stop reason: ALTCHOICE

## 2020-12-28 NOTE — PROGRESS NOTES
Medication Management [address]  [phone] (phone)  [phone] (fax)      Ms. Isaac Mckoy is a 80 y.o.  female with history of PE, per Dr. Jackelyn Hanson referral, who presents today for Warfarin monitoring and adjustment (1 week late for 6 week visit - late for today's visit). Patient verifies current dosing regimen and tablet strength. No missed or extra doses. Patient denies bleeding/bruising/swelling/SOB/chest pain. No blood in urine or stool. Dietary changes: had small amount of cranberry salad 12/25. Changes in medication/OTC agents/herbals: still doing eye drops after 12/3 and 12/9 cataract removals. No change in alcohol use or tobacco use. Change in activity level: increased. Patient denies headaches/dizziness/lightheadedness/falls. No vomiting/diarrhea or acute illness. C/o fatigue. No procedures scheduled in the future at this time. Assessment:   Lab Results   Component Value Date    INR 1.80 (H) 12/28/2020    INR 2.20 (H) 11/10/2020    INR 2.10 (H) 09/29/2020     INR subtherapeutic - goal 2-3. Recent Labs     12/28/20  1402   INR 1.80*       Plan:  POCT INR ordered/performed/result reviewed. 6 mg today, per policy, then continue Coumadin 4 mg daily PO. Recheck INR in 3 week(s), per policy. Patient reminded to call the Anticoagulation Clinic with any signs or symptoms of bleeding or with any medication changes. Patient given instructions utilizing the teach back method. Discharged ambulatory in no apparent distress, wearing mask. After visit summary printed and reviewed with patient. Medications reviewed and updated on home medication list.    Influenza vaccine: doesn't take.     [] given    [x] declined   [] received previously   [] plans to receive at a later time   [x] refused    [x] documented in Epic

## 2021-01-18 ENCOUNTER — HOSPITAL ENCOUNTER (OUTPATIENT)
Dept: PHARMACY | Age: 86
Setting detail: THERAPIES SERIES
Discharge: HOME | End: 2021-01-18
Payer: MEDICARE

## 2021-01-18 VITALS — TEMPERATURE: 97.8 F

## 2021-01-18 DIAGNOSIS — I26.99 OTHER PULMONARY EMBOLISM WITHOUT ACUTE COR PULMONALE, UNSPECIFIED CHRONICITY (HCC): ICD-10-CM

## 2021-01-18 LAB — POC INR: 2.7 (ref 0.8–1.2)

## 2021-01-18 PROCEDURE — 85610 PROTHROMBIN TIME: CPT

## 2021-01-18 PROCEDURE — 36416 COLLJ CAPILLARY BLOOD SPEC: CPT

## 2021-01-18 PROCEDURE — 99211 OFF/OP EST MAY X REQ PHY/QHP: CPT

## 2021-02-15 ENCOUNTER — APPOINTMENT (OUTPATIENT)
Dept: PHARMACY | Age: 86
End: 2021-02-15
Payer: MEDICARE

## 2021-02-22 ENCOUNTER — HOSPITAL ENCOUNTER (OUTPATIENT)
Dept: PHARMACY | Age: 86
Setting detail: THERAPIES SERIES
Discharge: HOME | End: 2021-02-22
Payer: MEDICARE

## 2021-02-22 VITALS — TEMPERATURE: 97.9 F

## 2021-02-22 DIAGNOSIS — I26.99 OTHER PULMONARY EMBOLISM WITHOUT ACUTE COR PULMONALE, UNSPECIFIED CHRONICITY (HCC): ICD-10-CM

## 2021-02-22 LAB — POC INR: 3.1 (ref 0.8–1.2)

## 2021-02-22 PROCEDURE — 85610 PROTHROMBIN TIME: CPT

## 2021-02-22 PROCEDURE — 99211 OFF/OP EST MAY X REQ PHY/QHP: CPT

## 2021-02-22 PROCEDURE — 36416 COLLJ CAPILLARY BLOOD SPEC: CPT

## 2021-02-22 NOTE — PROGRESS NOTES
Medication Management [address]  [phone] (phone)  [phone] (fax)      Ms. David Cardenas is a 80 y.o.  female with history of PE, per Dr. Adriana Wakefield referral, who presents today for Warfarin monitoring and adjustment (1 week late for 4 week visit). Patient verifies current dosing regimen and tablet strength. No missed or extra doses. Patient denies bleeding/bruising. After the episode of hearing pop in back (see below), did have pain in middle of back to chest and down both arms. Has usual SOB/swelling of legs (wears compression socks). No blood in urine or stool. Dietary changes: had less salad and less food in general (decreased appetite with back pain). Hopes to resume usual salads. Changes in medication/OTC agents/herbals: has used small amount of Tylenol for back pain; did take 1 Ibuprofen/day 2-3 days last week (knows it's not good with Coumadin). Pain started after doing some exercises with arms that provider had given her to do - heard a pop in back. Plans to see PCP. No change in alcohol use or tobacco use. Change in activity level: decreased. Patient denies headaches/falls. Having some dizziness she blames on not having new glasses - gets in 2 days. No vomiting/diarrhea or acute illness. No procedures scheduled in the future at this time. Assessment:   Lab Results   Component Value Date    INR 3.10 (H) 02/22/2021    INR 2.70 (H) 01/18/2021    INR 1.80 (H) 12/28/2020     INR supratherapeutic - goal 2-3. Recent Labs     02/22/21  1410   INR 3.10*       Plan:  POCT INR ordered/performed/result reviewed. 2 mg today, per policy, then continue Coumadin 4 mg daily PO. Recheck INR in 3 week(s), per policy. Patient reminded to call the Anticoagulation Clinic with any signs or symptoms of bleeding or with any medication changes. Patient given instructions utilizing the teach back method. Advised extra caution. Discharged ambulatory in no apparent distress, wearing mask. After visit summary printed and reviewed with patient. Medications reviewed and updated on home medication list.    Influenza vaccine: doesn't take.   [] given    [x] declined   [] received previously   [] plans to receive at a later time   [x] refused    [x] documented in Epic

## 2021-03-15 ENCOUNTER — HOSPITAL ENCOUNTER (OUTPATIENT)
Dept: PHARMACY | Age: 86
Setting detail: THERAPIES SERIES
Discharge: HOME | End: 2021-03-15
Payer: MEDICARE

## 2021-03-15 DIAGNOSIS — I10 ESSENTIAL HYPERTENSION: ICD-10-CM

## 2021-03-15 DIAGNOSIS — Z95.828 PRESENCE OF IVC FILTER: ICD-10-CM

## 2021-03-15 DIAGNOSIS — I26.99 OTHER PULMONARY EMBOLISM WITHOUT ACUTE COR PULMONALE, UNSPECIFIED CHRONICITY (HCC): ICD-10-CM

## 2021-03-15 DIAGNOSIS — E03.9 HYPOTHYROIDISM, UNSPECIFIED TYPE: ICD-10-CM

## 2021-03-15 LAB — POC INR: 2.4 (ref 0.8–1.2)

## 2021-03-15 PROCEDURE — 36416 COLLJ CAPILLARY BLOOD SPEC: CPT

## 2021-03-15 PROCEDURE — 99212 OFFICE O/P EST SF 10 MIN: CPT

## 2021-03-15 PROCEDURE — 85610 PROTHROMBIN TIME: CPT

## 2021-03-15 RX ORDER — DILTIAZEM HYDROCHLORIDE 180 MG/1
360 CAPSULE, EXTENDED RELEASE ORAL DAILY
Qty: 180 CAPSULE | Refills: 4 | Status: CANCELLED | OUTPATIENT
Start: 2021-03-15

## 2021-03-15 RX ORDER — WARFARIN SODIUM 2 MG/1
TABLET ORAL EVERY EVENING
COMMUNITY
End: 2021-04-12

## 2021-03-15 RX ORDER — WARFARIN SODIUM 2 MG/1
TABLET ORAL
Qty: 180 TABLET | Refills: 1 | Status: SHIPPED | OUTPATIENT
Start: 2021-03-15 | End: 2021-09-07

## 2021-03-15 RX ORDER — LEVOTHYROXINE SODIUM 0.12 MG/1
125 TABLET ORAL DAILY
Qty: 90 TABLET | Refills: 4 | Status: CANCELLED | OUTPATIENT
Start: 2021-03-15

## 2021-03-15 NOTE — TELEPHONE ENCOUNTER
Pt informed with her appointment for a medication refill.    Future Appointments   Date Time Provider Samuel Valladares   3/15/2021  2:20 PM Kathy Philip RN [facility]   3/18/2021  4:20 PM LISA Castanon

## 2021-03-15 NOTE — PROGRESS NOTES
Medication Management [address]  [phone] (phone)  [phone] (fax)      Ms. Lena Triplett is a 80 y.o.  female with history of PE, per Dr. Aleksandra Burris referral, who presents today for Warfarin monitoring and adjustment (3 week visit - 1 hour late for today's visit; forgot to change clocks.)  . Patient verifies current dosing regimen and tablet strength. No missed or extra doses. Patient denies bleeding/bruising/swelling/chest pain. Has usual SOB. Wears compression hose. Taking Tylenol for chest soreness since pulling something in her back before last visit. Sees PCP this week. No blood in urine or stool. No dietary changes. No changes in medication/OTC agents/herbals. No change in alcohol use or tobacco use. No change in activity level. Patient denies headaches/dizziness/lightheadedness/falls. No vomiting/diarrhea or acute illness. No procedures scheduled in the future at this time. Assessment:   Lab Results   Component Value Date    INR 2.40 (H) 03/15/2021    INR 3.10 (H) 02/22/2021    INR 2.70 (H) 01/18/2021     INR therapeutic - goal 2-3. Recent Labs     03/15/21  1525   INR 2.40*       Plan:  POCT INR ordered/performred/result reviewed. Continue Coumadin 4 mg daily PO . Recheck INR in 4 week(s). Patient reminded to call the Anticoagulation Clinic with any signs or symptoms of bleeding or with any medication changes. Patient given instructions utilizing the teach back method. Given routine H&H order. Discharged ambulatory in no apparent distress, wearing mask. Prescription renewed electronically by clinic pharmacist - patient wanted put on file. After visit summary printed and reviewed with patient. Medications reviewed and updated on home medication list.    Influenza vaccine: doesn't take.   [] given    [x] declined   [] received previously   [] plans to receive at a later time   [x] refused    [x] documented in Epic

## 2021-03-18 ENCOUNTER — OFFICE VISIT (OUTPATIENT)
Dept: FAMILY MEDICINE CLINIC | Age: 86
End: 2021-03-18
Payer: MEDICARE

## 2021-03-18 ENCOUNTER — TELEPHONE (OUTPATIENT)
Dept: FAMILY MEDICINE CLINIC | Age: 86
End: 2021-03-18

## 2021-03-18 VITALS
WEIGHT: 170 LBS | BODY MASS INDEX: 29.02 KG/M2 | HEART RATE: 72 BPM | OXYGEN SATURATION: 96 % | SYSTOLIC BLOOD PRESSURE: 121 MMHG | RESPIRATION RATE: 14 BRPM | TEMPERATURE: 97.4 F | HEIGHT: 64 IN | DIASTOLIC BLOOD PRESSURE: 70 MMHG

## 2021-03-18 DIAGNOSIS — Z00.00 ROUTINE GENERAL MEDICAL EXAMINATION AT A HEALTH CARE FACILITY: ICD-10-CM

## 2021-03-18 DIAGNOSIS — Z71.89 ACP (ADVANCE CARE PLANNING): ICD-10-CM

## 2021-03-18 DIAGNOSIS — Z13.220 SCREENING FOR HYPERLIPIDEMIA: ICD-10-CM

## 2021-03-18 DIAGNOSIS — M48.02 CERVICAL SPINAL STENOSIS: ICD-10-CM

## 2021-03-18 DIAGNOSIS — E03.9 HYPOTHYROIDISM, UNSPECIFIED TYPE: ICD-10-CM

## 2021-03-18 DIAGNOSIS — I10 ESSENTIAL HYPERTENSION: ICD-10-CM

## 2021-03-18 PROCEDURE — G0438 PPPS, INITIAL VISIT: HCPCS | Performed by: NURSE PRACTITIONER

## 2021-03-18 PROCEDURE — 99497 ADVNCD CARE PLAN 30 MIN: CPT | Performed by: NURSE PRACTITIONER

## 2021-03-18 PROCEDURE — 99214 OFFICE O/P EST MOD 30 MIN: CPT | Performed by: NURSE PRACTITIONER

## 2021-03-18 RX ORDER — KETOROLAC TROMETHAMINE 10 MG/1
10 TABLET, FILM COATED ORAL EVERY 6 HOURS PRN
Qty: 20 TABLET | Refills: 0 | Status: SHIPPED | OUTPATIENT
Start: 2021-03-18 | End: 2022-03-29

## 2021-03-18 RX ORDER — ATORVASTATIN CALCIUM 20 MG/1
20 TABLET, FILM COATED ORAL EVERY EVENING
Qty: 90 TABLET | Refills: 4 | Status: SHIPPED | OUTPATIENT
Start: 2021-03-18 | End: 2022-03-29 | Stop reason: SDUPTHER

## 2021-03-18 RX ORDER — DILTIAZEM HYDROCHLORIDE 180 MG/1
360 CAPSULE, EXTENDED RELEASE ORAL DAILY
Qty: 180 CAPSULE | Refills: 4 | Status: SHIPPED | OUTPATIENT
Start: 2021-03-18 | End: 2022-03-29 | Stop reason: SDUPTHER

## 2021-03-18 RX ORDER — LEVOTHYROXINE SODIUM 0.12 MG/1
125 TABLET ORAL DAILY
Qty: 90 TABLET | Refills: 4 | Status: SHIPPED | OUTPATIENT
Start: 2021-03-18 | End: 2022-03-29 | Stop reason: SDUPTHER

## 2021-03-18 ASSESSMENT — PATIENT HEALTH QUESTIONNAIRE - PHQ9: SUM OF ALL RESPONSES TO PHQ9 QUESTIONS 1 & 2: 0

## 2021-03-18 ASSESSMENT — LIFESTYLE VARIABLES: HOW OFTEN DO YOU HAVE A DRINK CONTAINING ALCOHOL: 0

## 2021-03-18 NOTE — PATIENT INSTRUCTIONS
Advance Directives: Care Instructions  Overview  An advance directive is a legal way to state your wishes at the end of your life. It tells your family and your doctor what to do if you can't say what you want. There are two main types of advance directives. You can change them any time your wishes change. Living will. This form tells your family and your doctor your wishes about life support and other treatment. The form is also called a declaration. Medical power of . This form lets you name a person to make treatment decisions for you when you can't speak for yourself. This person is called a health care agent (health care proxy, health care surrogate). The form is also called a durable power of  for health care. If you do not have an advance directive, decisions about your medical care may be made by a family member, or by a doctor or a  who doesn't know you. It may help to think of an advance directive as a gift to the people who care for you. If you have one, they won't have to make tough decisions by themselves. Follow-up care is a key part of your treatment and safety. Be sure to make and go to all appointments, and call your doctor if you are having problems. It's also a good idea to know your test results and keep a list of the medicines you take. What should you include in an advance directive? Many states have a unique advance directive form. (It may ask you to address specific issues.) Or you might use a universal form that's approved by many states. If your form doesn't tell you what to address, it may be hard to know what to include in your advance directive. Use the questions below to help you get started. · Who do you want to make decisions about your medical care if you are not able to? · What life-support measures do you want if you have a serious illness that gets worse over time or can't be cured? · What are you most afraid of that might happen? (Maybe you're afraid of having pain, losing your independence, or being kept alive by machines.)  · Where would you prefer to die? (Your home? A hospital? A nursing home?)  · Do you want to donate your organs when you die? · Do you want certain Lutheran practices performed before you die? When should you call for help? Be sure to contact your doctor if you have any questions. Where can you learn more? Go to [email or web address] [email or web address]."[email or web address]. org and sign in to your [employer] account. Enter R264 in the [employer] box to learn more about \"Advance Directives: Care Instructions. \"     If you do not have an account, please click on the \"Sign Up Now\" link. Current as of: July 17, 2020               Content Version: 12.8  © 9671-0888 Healthwise, [employer]. Care instructions adapted under license by [facility] [address] If you have questions about a medical condition or this instruction, always ask your healthcare professional. [place] [address] any warranty or liability for your use of this information. Personalized Preventive Plan for Any [place] - 3/18/2021  Medicare offers a range of preventive health benefits. Some of the tests and screenings are paid in full while other may be subject to a deductible, co-insurance, and/or copay. Some of these benefits include a comprehensive review of your medical history including lifestyle, illnesses that may run in your family, and various assessments and screenings as appropriate. After reviewing your medical record and screening and assessments performed today your provider may have ordered immunizations, labs, imaging, and/or referrals for you. A list of these orders (if applicable) as well as your Preventive Care list are included within your After Visit Summary for your review.     Other Preventive Recommendations:    · A preventive eye exam performed by an eye specialist is recommended every 1-2 years to screen for glaucoma; cataracts, macular degeneration, and other eye disorders. · A preventive dental visit is recommended every 6 months. · Try to get at least 150 minutes of exercise per week or 10,000 steps per day on a pedometer . · Order or download the FREE \"Exercise & Physical Activity: Your Everyday Guide\" from [employer]. Call 7-185.257.1787 or search [employer] online. · You need 2145-4999 mg of calcium and 1745-9677 IU of vitamin D per day. It is possible to meet your calcium requirement with diet alone, but a vitamin D supplement is usually necessary to meet this goal.  · When exposed to the sun, use a sunscreen that protects against both UVA and UVB radiation with an SPF of 30 or greater. Reapply every 2 to 3 hours or after sweating, drying off with a towel, or swimming. · Always wear a seat belt when traveling in a car. Always wear a helmet when riding a bicycle or motorcycle.

## 2021-03-18 NOTE — TELEPHONE ENCOUNTER
ABDULAZIZM for pt to call us back to schedule appts, AWFRANDY in one year and check up with Kumar in 6 months, she left after her appt without checking out.

## 2021-03-18 NOTE — PROGRESS NOTES
Medicare Annual Wellness Visit  Name: Nel Simental Date: 3/19/2021   MRN: [id number] Sex: Female   Age: 80 y.o. Ethnicity: Non-/Non    : 1934 Race: White      Emma Mesa is here for Medicare AWV    Screenings for behavioral, psychosocial and functional/safety risks, and cognitive dysfunction are all negative except as indicated below. These results, as well as other patient data from the [address] form, are documented in Flowsheets linked to this Encounter. Allergies   Allergen Reactions    Bactrim [Sulfamethoxazole-Trimethoprim] Nausea Only    Tramadol Other (See Comments)     Patient felt \"not right\"    Codeine Nausea And Vomiting         Prior to Visit Medications    Medication Sig Taking? Authorizing Provider   atorvastatin (LIPITOR) 20 MG tablet Take 1 tablet by mouth every evening Indications: Blood Cholesterol Abnormal Yes Adolm Romberg, APRN - CNP   levothyroxine (SYNTHROID) 125 MCG tablet Take 1 tablet by mouth Daily Indications: Impaired Thyroid Function Pt wants only synthroid brand name  Takes [facility] only. 18. Yes Adolm Romberg, APRN - CNP   dilTIAZem (DILACOR XR) 180 MG extended release capsule Take 2 capsules by mouth daily Indications: High Blood Pressure Disorder Yes Adolm Romberg, APRN - CNP   ketorolac (TORADOL) 10 MG tablet Take 1 tablet by mouth every 6 hours as needed for Pain Yes Adolm Romberg, APRN - CNP   warfarin (JANTOVEN) 2 MG tablet TAKE AS DIRECTED BY [facility] 180 TABS = 90 DAYS Yes Ilya Mathews MD   warfarin (COUMADIN) 2 MG tablet Take by mouth every evening Dosed by [facility]. Yes Historical Provider, MD   Polyethyl Glycol-Propyl Glycol (SYSTANE ULTRA OP) Apply 1 drop to eye 2 times daily Yes Historical Provider, MD   acetaminophen (TYLENOL) 500 MG tablet Take 500 mg by mouth every 6 hours as needed for Pain or Fever Don't take more than 3,000 mg each day.  Yes Historical Provider, MD   [facility] 5000 PLUS 1.1 % CREA Place onto teeth 2 times daily  Yes Historical Provider, MD         Past Medical History:   Diagnosis Date    Arthritis     wrist, back, neck, foot    Blood circulation, collateral     Breathing difficulty     Chronic kidney disease     DVT (deep venous thrombosis) (HCC)     History of blood transfusion 1960's    child birth   [de-identified] Hx of blood clots 2013? ??    left leg    Hyperlipidemia     Hypertension     Pneumonia     Prolonged emergence from general anesthesia     with hysterectomy? ?    Thyroid disease     Unspecified diseases of blood and blood-forming organs        Past Surgical History:   Procedure Laterality Date    CATARACT REMOVAL Right 12/03/2020    CATARACT REMOVAL Left 12/09/2020    COLONOSCOPY      last one before 2005???    CYSTOSCOPY  08/18/2015    HYDRODISTENTION, RPG, LEFT STENT    HYSTERECTOMY  1983??    SKIN BIOPSY      VARICOSE VEIN SURGERY  1969??    VENA CAVA FILTER PLACEMENT  01/01/2013         Family History   Problem Relation Age of Onset    Cancer Mother     Heart Disease Father        CareTeam (Including outside providers/suppliers regularly involved in providing care):   Patient Care Team:  LISA Blount CNP as PCP - General (Nurse Practitioner)  LISA Blount CNP [facility]CP [facility]    Wt Readings from Last 3 Encounters:   03/18/21 170 lb (77.1 kg)   09/22/20 163 lb 6.4 oz (74.1 kg)   09/16/20 163 lb 6.4 oz (74.1 kg)     Vitals:    03/18/21 1608   BP: 121/70   Pulse: 72   Resp: 14   Temp: 97.4 °F (36.3 °C)   TempSrc: Temporal   SpO2: 96%   Weight: 170 lb (77.1 kg)   Height: 5' 4\" (1.626 m)     Body mass index is 29.18 kg/m². Based upon direct observation of the patient, evaluation of cognition reveals recent and remote memory intact.     General Appearance: alert and oriented to person, place and time, well-developed and well-nourished, in no acute distress  Skin: warm and dry, no rash or erythema  Head: normocephalic and atraumatic  Eyes: pupils equal, round, and reactive to light, extraocular eye movements intact, conjunctivae normal  ENT: tympanic membrane, external ear and ear canal normal bilaterally, oropharynx clear and moist with normal mucous membranes  Neck: neck supple and non tender without mass, no thyromegaly or thyroid nodules, no cervical lymphadenopathy   Pulmonary/Chest: clear to auscultation bilaterally- no wheezes, rales or rhonchi, normal air movement, no respiratory distress  Cardiovascular: normal rate, normal S1 and S2, no gallops, intact distal pulses and no carotid bruits    Patient's complete Health Risk Assessment and screening values have been reviewed and are found in Flowsheets. The following problems were reviewed today and where indicated follow up appointments were made and/or referrals ordered. Positive Risk Factor Screenings with Interventions:     Fall Risk:  Timed Up and Go Test > 12 seconds? (Complete if either Fall Risk answers are Yes): no  2 or more falls in past year?: (!) yes  Fall with injury in past year?: (!) yes  Fall Risk Interventions:    · Home safety tips provided  · Home exercises provided to promote strength and balance          General Health and ACP:  General  In general, how would you say your health is?: Fair  In the past 7 days, have you experienced any of the following?  New or Increased Pain, New or Increased Fatigue, Loneliness, Social Isolation, Stress or Anger?: None of These  Do you get the social and emotional support that you need?: Yes  Do you have a Living Will?: (!) No  Advance Directives     Power of [employer] Will ACP-Advance Directive ACP-Power of     Not on File Not on File Not on File Not on File      General Health Risk Interventions:  · No Living Will: Advance Care Planning addressed with patient today     Hearing/Vision:  No exam data present  Hearing/Vision  Do you or your family notice any trouble with your hearing that hasn't been managed with hearing aids?: (!) Yes  Do you have difficulty driving, watching TV, or doing any of your daily activities because of your eyesight?: No  Have you had an eye exam within the past year?: Yes  Hearing/Vision Interventions:  · n/a    Safety:  Safety  Do you have working smoke detectors?: Yes  Have all throw rugs been removed or fastened?: (!) No  Do you have non-slip mats or surfaces in all bathtubs/showers?: Yes  Do all of your stairways have a railing or banister?: Yes  Are your doorways, halls and stairs free of clutter?: (!) No  Do you always fasten your seatbelt when you are in a car?: Yes  Safety Interventions:  · Home safety tips provided     Personalized Preventive Plan   Current Health Maintenance Status  Immunization History   Administered Date(s) Administered    Influenza Vaccine, unspecified formulation 10/14/2011    Influenza Virus Vaccine 09/24/2014, 01/30/2018    Influenza, Quadv, IM, PF (6 mo and older Fluzone, Flulaval, Fluarix, and 3 yrs and older Afluria) 01/30/2018    PPD Test 12/11/2013        Health Maintenance   Topic Date Due    COVID-19 Vaccine (1) Never done    DTaP/Tdap/Td vaccine (1 - Tdap) Never done    Shingles Vaccine (1 of 2) Never done    Pneumococcal 65+ years Vaccine (1 of 1 - PPSV23) Never done   [employer] Visit (AWV)  Never done    Flu vaccine (1) 09/01/2020    Lipid screen  12/20/2020    TSH testing  02/04/2021    Hepatitis A vaccine  Aged Out    Hepatitis B vaccine  Aged Out    Hib vaccine  Aged Out    Meningococcal (ACWY) vaccine  Aged Out     Recommendations for rapt[employer]fm Due: see orders and patient instructions/AVS.  . Recommended screening schedule for the next 5-10 years is provided to the patient in written form: see Patient Instructions/AVS.    Ej Carter was seen today for medicare awv.     Diagnoses and all orders for this visit:    Routine general medical examination at a health care facility    Hypothyroidism, unspecified type  -     levothyroxine (SYNTHROID) 125 MCG tablet; Take 1 tablet by mouth Daily Indications: Impaired Thyroid Function Pt wants only synthroid brand name  Takes [facility] only. 5-14-18.  -     TSH With Reflex Ft4; Future    Essential hypertension  -     dilTIAZem (DILACOR XR) 180 MG extended release capsule; Take 2 capsules by mouth daily Indications: High Blood Pressure Disorder    ACP (advance care planning)  -     [place] ADVANCED CARE PLAN FACE TO [address], 1ST 30MIN [id number]    Screening for hyperlipidemia  -     Lipid Panel; Future    Cervical spinal stenosis  -     ketorolac (TORADOL) 10 MG tablet; Take 1 tablet by mouth every 6 hours as needed for Pain    Other orders  -     atorvastatin (LIPITOR) 20 MG tablet; Take 1 tablet by mouth every evening Indications: Blood Cholesterol Abnormal                 Advance Care Planning   Advanced Care Planning: Discussed the patients choices for care and treatment in case of a health event that adversely affects decision-making abilities. Also discussed the patients long-term treatment options. Reviewed with the patient the [address] [place] [address] Declaration forms  Reviewed the process of designating a competent adult as an Agent (or -in-fact) that could take make health care decisions for the patient if incompetent. Patient was asked to complete the declaration forms, either acknowledge the forms by a public notary or an eligible witness and provide a signed copy to the practice office.   Time spent (minutes): 10

## 2021-03-18 NOTE — ACP (ADVANCE CARE PLANNING)
Advance Care Planning     General Advance Care Planning (ACP) Conversation    Date of Conversation: 3/18/2021  Conducted with: Patient with Decision Making Capacity    Healthcare Decision Maker:        Click here to complete [employer] including selection of the [employer] Relationship (ie \"Primary\")  Today we documented Decision Maker(s) consistent with Legal Next of Kin hierarchy.     Content/Action Overview:  DECLINED ACP Conversation - will revisit periodically  Reviewed DNR/DNI and patient elects Full Code (Attempt Resuscitation)  treatment goals, benefit/burden of treatment options and ventilation preferences  15    Length of Voluntary ACP Conversation in minutes:  <16 minutes (Non-Billable)    Romana Blazer

## 2021-03-20 ENCOUNTER — HOSPITAL ENCOUNTER (OUTPATIENT)
Age: 86
Discharge: HOME | End: 2021-03-20
Payer: MEDICARE

## 2021-03-20 DIAGNOSIS — E03.9 HYPOTHYROIDISM, UNSPECIFIED TYPE: ICD-10-CM

## 2021-03-20 DIAGNOSIS — I26.99 OTHER PULMONARY EMBOLISM WITHOUT ACUTE COR PULMONALE, UNSPECIFIED CHRONICITY (HCC): ICD-10-CM

## 2021-03-20 DIAGNOSIS — Z13.220 SCREENING FOR HYPERLIPIDEMIA: ICD-10-CM

## 2021-03-20 LAB
CHOLESTEROL, TOTAL: 157 MG/DL (ref 100–199)
HCT VFR BLD CALC: 46.2 % (ref 37–47)
HDLC SERPL-MCNC: 67 MG/DL
HEMOGLOBIN: 14.6 GM/DL (ref 12–16)
LDL CHOLESTEROL CALCULATED: 70 MG/DL
TRIGL SERPL-MCNC: 98 MG/DL (ref 0–199)
TSH SERPL DL<=0.05 MIU/L-ACNC: 1.15 UIU/ML (ref 0.4–4.2)

## 2021-03-20 PROCEDURE — 80061 LIPID PANEL: CPT

## 2021-03-20 PROCEDURE — 85018 HEMOGLOBIN: CPT

## 2021-03-20 PROCEDURE — 36415 COLL VENOUS BLD VENIPUNCTURE: CPT

## 2021-03-20 PROCEDURE — 84443 ASSAY THYROID STIM HORMONE: CPT

## 2021-03-20 PROCEDURE — 85014 HEMATOCRIT: CPT

## 2021-03-20 NOTE — PROGRESS NOTES
SUBJECTIVE:  Echo Powers is a 80 y.o. y/o female that presents with Medicare AWV  .    HPI:  Symptoms have been present for she has had neck pain for many years, has seen orthopedics before,  year(s). she describes the pain as aching, burning  in the cervical region. Inciting injury or history of trauma? No  Pain is relieved by - tylenol does help has also tried pain cream with some relief. Pain is aggravated by - neck movements up and down head movements  Radiation of the pain? No  Paresthesias of the extremities? No  Saddle anesthesia? No  Bowel or bladder incontinence? No  Treatments tried - tylenol pain creams did have therapy in the past  She has been seen by orthopedics and diagnosed with cervical spinal canal stenosis, declined surgery at that time. Declines today we did discuss returning to [facility] for treatment she will think about it. HTN    Does patient check BP regularly at home? - No  Current Medication regimen - lipitor, diltiazem, on chronic anticoagulation for history of PE  Tolerating medications well? - yes    Shortness of breath or chest pain? No  Headache or visual complaints? No  Neurologic changes like confusion? No  Extremity edema? No    BP Readings from Last 3 Encounters:   03/18/21 121/70   09/22/20 138/78   09/16/20 127/71     Hypothyroidism    HPI:  Currently treated for Hypothyroidism? Yes  Fatigue?  states she does get tired but also states she is getting older  Lab Results   Component Value Date    TSH 0.897 02/04/2020    T4FREE 1.52 12/20/2019     Lab Results   Component Value Date    WBC 10.6 09/17/2020    HGB 14.3 09/17/2020    HCT 45.0 09/17/2020    MCV 92.4 09/17/2020     09/17/2020   cent change in weight? No  Cold/Heat intolerance? No  Diarrhea/Constipation? No  Diaphoresis? No  Anxiety? No  Palpitations? No   Hair Loss?   No    Lab Results   Component Value Date    CHOL 179 12/20/2019    CHOL 157 06/27/2019     Lab Results   Component Value Date    TRIG 128 12/20/2019    TRIG 105 06/27/2019     Lab Results   Component Value Date    HDL 69 12/20/2019    HDL 72 06/27/2019     Lab Results   Component Value Date    LDLCALC 84 12/20/2019    LDLCALC 64 06/27/2019     No results found for: LABVLDL, VLDL  No results found for: [facility]   Lab Results   Component Value Date     09/08/2020    K 3.7 09/08/2020    CL 96 09/08/2020    CO2 24 09/08/2020    BUN 16 09/08/2020    CREATININE 0.6 09/08/2020    GLUCOSE 112 09/08/2020    GLUCOSE 121 05/29/2015    CALCIUM 9.2 09/08/2020        OBJECTIVE:  /70   Pulse 72   Temp 97.4 °F (36.3 °C) (Temporal)   Resp 14   Ht 5' 4\" (1.626 m)   Wt 170 lb (77.1 kg)   SpO2 96%   BMI 29.18 kg/m²   Physical Examination: General appearance - alert, well appearing, and in no distress  Chest - clear to auscultation, no wheezes, rales or rhonchi, symmetric air entry  Heart - normal rate, regular rhythm, normal S1, S2, no murmurs, rubs, clicks or gallops  Back exam - pain with motion noted during exam, normal reflex and strength of upper extremities. No limited room of neck ,  5/5 strength globally and symmetrically in the LEs, 2+ patellar reflexes b/l  Extremities - peripheral pulses normal, no pedal edema, no clubbing or cyanosis  Skin -  Skin color, texture, turgor normal. No rashes or lesions. Psych - Affect normal, no evidence of depression or anxiety    ASSESSMENT & PLAN  Emma was seen today for medicare awv. Diagnoses and all orders for this visit:    Routine general medical examination at a health care facility    Hypothyroidism, unspecified type  -     levothyroxine (SYNTHROID) 125 MCG tablet; Take 1 tablet by mouth Daily Indications: Impaired Thyroid Function Pt wants only synthroid brand name  Takes [facility] only. 5-14-18.  -     TSH With Reflex Ft4; Future    Essential hypertension  -     dilTIAZem (DILACOR XR) 180 MG extended release capsule;  Take 2 capsules by mouth daily Indications: High Blood Pressure Disorder    ACP (advance care planning)  -     [place] ADVANCED CARE PLAN FACE TO [address], 1ST 30MIN [id number]    Screening for hyperlipidemia  -     Lipid Panel; Future    Cervical spinal stenosis  -     ketorolac (TORADOL) 10 MG tablet; Take 1 tablet by mouth every 6 hours as needed for Pain  toradol for pain control      May aso use pain cream  Pt in a greement with plan  Other orders  -     atorvastatin (LIPITOR) 20 MG tablet; Take 1 tablet by mouth every evening Indications: Blood Cholesterol Abnormal        Return in 6 months (on 9/18/2021) for Medicare Annual Wellness Visit in 1 year, CLEMENTINE 6 MONTHS . Emma received counseling on the following healthy behaviors: medication adherence  Reviewed prior labs and health maintenance. Continue current medications, diet and exercise. Discussed use, benefit, and side effects of prescribed medications. Barriers to medication compliance addressed. Patient given educational materials - see patient instructions. All patient questions answered. Patient voiced understanding.

## 2021-03-22 ENCOUNTER — TELEPHONE (OUTPATIENT)
Dept: FAMILY MEDICINE CLINIC | Age: 86
End: 2021-03-22

## 2021-03-22 NOTE — TELEPHONE ENCOUNTER
----- Message from LISA Xei CNP sent at 3/22/2021  8:00 AM EDT -----  Let pt know her labs are all stable and in appropriate range, continue all current meds

## 2021-04-12 ENCOUNTER — HOSPITAL ENCOUNTER (OUTPATIENT)
Dept: PHARMACY | Age: 86
Setting detail: THERAPIES SERIES
Discharge: HOME | End: 2021-04-12
Payer: MEDICARE

## 2021-04-12 DIAGNOSIS — Z79.01 ANTICOAGULATED ON COUMADIN: ICD-10-CM

## 2021-04-12 DIAGNOSIS — Z51.81 ENCOUNTER FOR THERAPEUTIC DRUG MONITORING: ICD-10-CM

## 2021-04-12 DIAGNOSIS — I26.99 OTHER PULMONARY EMBOLISM WITHOUT ACUTE COR PULMONALE, UNSPECIFIED CHRONICITY (HCC): ICD-10-CM

## 2021-04-12 LAB — POC INR: 2.2 (ref 0.8–1.2)

## 2021-04-12 PROCEDURE — 99211 OFF/OP EST MAY X REQ PHY/QHP: CPT

## 2021-04-12 PROCEDURE — 85610 PROTHROMBIN TIME: CPT

## 2021-04-12 PROCEDURE — 36416 COLLJ CAPILLARY BLOOD SPEC: CPT

## 2021-04-12 NOTE — PROGRESS NOTES
Medication Management [address]   [phone] (phone)  [phone] (fax)      Ms. Trung Brooks is a 80 y.o.  female with history of PE, per Dr. Yenny Dumont referral,  who presents today for Warfarin  monitoring and adjustment (4 week visit). Patient verifies current dosing regimen and tablet strength. No missed or extra doses. Patient denies bleeding/bruising/swelling/chest pain. SOB worsens only if tired/rushing/or overdoing it. Wears compression socks. No blood in urine or stool. No dietary changes. No changes in medication/OTC agents/herbals. No change in alcohol use or tobacco use. No change in activity level. Patient denies headaches/dizziness/lightheadedness/falls. Takes the occasional Tylenol for typical back pain. Has not used Toradol - reminded to take with food, but use sparingly since on Coumadin. No vomiting/diarrhea or acute illness. No procedures scheduled in the future at this time. [place]. Assessment:   Lab Results   Component Value Date    INR 2.20 (H) 04/12/2021    INR 2.40 (H) 03/15/2021    INR 3.10 (H) 02/22/2021     INR therapeutic - goal 2-3. Recent Labs     04/12/21  1428   INR 2.20*     Did routine H&H 3/20:  14.6/46.2 (14.3/45 on 9/17/20). Plan:  POCT INR ordered/performed/result reviewed. Continue Coumadin 4 mg daily PO. Recheck INR in 4 week(s). Patient reminded to call the Anticoagulation Clinic with any signs or symptoms of bleeding or with any medication changes. Patient given instructions utilizing the teach back method. Discharged ambulatory in no apparent distress, wearing mask. After visit summary printed and reviewed with patient.       Medications reviewed and updated on home medication list.    Influenza vaccine:     [] given    [] declined   [] received previously   [] plans to receive at a later time   [] refused    [] documented in Epic

## 2021-05-10 ENCOUNTER — HOSPITAL ENCOUNTER (OUTPATIENT)
Dept: PHARMACY | Age: 86
Setting detail: THERAPIES SERIES
Discharge: HOME | End: 2021-05-10
Payer: MEDICARE

## 2021-05-10 DIAGNOSIS — I26.99 PULMONARY EMBOLISM, UNSPECIFIED CHRONICITY, UNSPECIFIED PULMONARY EMBOLISM TYPE, UNSPECIFIED WHETHER ACUTE COR PULMONALE PRESENT (HCC): ICD-10-CM

## 2021-05-10 DIAGNOSIS — Z79.01 ANTICOAGULATED ON COUMADIN: ICD-10-CM

## 2021-05-10 DIAGNOSIS — Z51.81 ENCOUNTER FOR THERAPEUTIC DRUG MONITORING: ICD-10-CM

## 2021-05-10 LAB — POC INR: 2.5 (ref 0.8–1.2)

## 2021-05-10 PROCEDURE — 99211 OFF/OP EST MAY X REQ PHY/QHP: CPT

## 2021-05-10 PROCEDURE — 36416 COLLJ CAPILLARY BLOOD SPEC: CPT

## 2021-05-10 PROCEDURE — 85610 PROTHROMBIN TIME: CPT

## 2021-06-07 ENCOUNTER — HOSPITAL ENCOUNTER (OUTPATIENT)
Dept: PHARMACY | Age: 86
Setting detail: THERAPIES SERIES
Discharge: HOME | End: 2021-06-07
Payer: MEDICARE

## 2021-06-07 ENCOUNTER — TELEPHONE (OUTPATIENT)
Dept: FAMILY MEDICINE CLINIC | Age: 86
End: 2021-06-07

## 2021-06-07 DIAGNOSIS — Z79.01 ANTICOAGULATED ON COUMADIN: ICD-10-CM

## 2021-06-07 DIAGNOSIS — Z51.81 ENCOUNTER FOR THERAPEUTIC DRUG MONITORING: ICD-10-CM

## 2021-06-07 DIAGNOSIS — I26.99 PULMONARY EMBOLISM, UNSPECIFIED CHRONICITY, UNSPECIFIED PULMONARY EMBOLISM TYPE, UNSPECIFIED WHETHER ACUTE COR PULMONALE PRESENT (HCC): Primary | ICD-10-CM

## 2021-06-07 DIAGNOSIS — I27.82 OTHER CHRONIC PULMONARY EMBOLISM, UNSPECIFIED WHETHER ACUTE COR PULMONALE PRESENT (HCC): Primary | ICD-10-CM

## 2021-06-07 DIAGNOSIS — Z79.01 CHRONIC ANTICOAGULATION: ICD-10-CM

## 2021-06-07 LAB — POC INR: 3 (ref 0.8–1.2)

## 2021-06-07 PROCEDURE — 99211 OFF/OP EST MAY X REQ PHY/QHP: CPT

## 2021-06-07 PROCEDURE — 85610 PROTHROMBIN TIME: CPT

## 2021-06-07 PROCEDURE — 36416 COLLJ CAPILLARY BLOOD SPEC: CPT

## 2021-06-07 NOTE — PROGRESS NOTES
Medication Management [address]   [phone] (phone)  [phone] (fax)    Ms. Daja Mcneil is a 80 y.o.  female with history of PE, per Dr. Radha Boateng referral,  who presents today for Warfarin monitoring and adjustment (4 week visit). Patient verifies current dosing regimen and tablet strength. No missed or extra doses. Patient denies bleeding/chest pain. Has usual easy bruising. Had increased SOB for a couple of days - usual now. Has usual swelling of feet intermittently - wears compression socks. No blood in urine or stool. Dietary changes: had more salad. No changes in medication/OTC agents/herbals. No change in alcohol use or tobacco use. Change in activity level: had been increased, but back to usual now. Patient denies headaches/dizziness/lightheadedness/falls. Takes usual Tylenol for back pain/LW pain. States has spurs on spine. No vomiting/diarrhea or acute illness. No procedures scheduled in the future at this time. Assessment:   Lab Results   Component Value Date    INR 3.00 (H) 06/07/2021    INR 2.50 (H) 05/10/2021    INR 2.20 (H) 04/12/2021     INR therapeutic - goal 2-3. Recent Labs     06/07/21  1443   INR 3.00*       Plan:  POCT INR ordered/performed/result reviewed. Continue Coumadin 4 mg daily PO. Recheck INR in 4 week(s). Patient reminded to call the Anticoagulation Clinic with any signs or symptoms of bleeding or with any medication changes. Patient given instructions utilizing the teach back method. After visit summary printed and reviewed with patient. Discharged ambulatory in no apparent distress, wearing mask.

## 2021-07-06 ENCOUNTER — HOSPITAL ENCOUNTER (OUTPATIENT)
Dept: PHARMACY | Age: 86
Setting detail: THERAPIES SERIES
Discharge: HOME | End: 2021-07-06
Payer: MEDICARE

## 2021-07-06 DIAGNOSIS — I26.99 PULMONARY EMBOLISM, UNSPECIFIED CHRONICITY, UNSPECIFIED PULMONARY EMBOLISM TYPE, UNSPECIFIED WHETHER ACUTE COR PULMONALE PRESENT (HCC): Primary | ICD-10-CM

## 2021-07-06 DIAGNOSIS — Z79.01 ANTICOAGULATED ON COUMADIN: ICD-10-CM

## 2021-07-06 DIAGNOSIS — Z51.81 ENCOUNTER FOR THERAPEUTIC DRUG MONITORING: ICD-10-CM

## 2021-07-06 LAB — POC INR: 2.5 (ref 0.8–1.2)

## 2021-07-06 PROCEDURE — 36416 COLLJ CAPILLARY BLOOD SPEC: CPT

## 2021-07-06 PROCEDURE — 99211 OFF/OP EST MAY X REQ PHY/QHP: CPT

## 2021-07-06 PROCEDURE — 85610 PROTHROMBIN TIME: CPT

## 2021-07-06 NOTE — PROGRESS NOTES
Medication Management [address]   [phone] (phone)  [phone] (fax)    Ms. Kj Cope is a 80 y.o.  female with history of PE, per referral from BENNIE Bynum, who presents today for Warfarin monitoring and adjustment (4 week visit). Patient verifies current dosing regimen and tablet strength. No missed or extra doses. Patient denies bleeding/chest pain. Had swelling of legs 1 day, even though she wore her compression socks. Has usual SOB/intermittent bruising. No blood in urine or stool. No dietary changes. No changes in medication/OTC agents/herbals. No change in alcohol use or tobacco use. No change in activity level. Patient denies headaches/dizziness/lightheadedness/falls. No vomiting/diarrhea or acute illness. No procedures scheduled in the future at this time. [place]. Assessment:   Lab Results   Component Value Date    INR 2.50 (H) 07/06/2021    INR 3.00 (H) 06/07/2021    INR 2.50 (H) 05/10/2021     INR therapeutic - goal 2-3. Recent Labs     07/06/21  1443   INR 2.50*       Plan:  POCT INR ordered/performed/result reviewed. Continue Coumadin 4 mg daily PO. Recheck INR in 5 week(s). Patient reminded to call the Anticoagulation Clinic with any signs or symptoms of bleeding or with any medication changes. Patient given instructions utilizing the teach back method. After visit summary printed and reviewed with patient. Discharged ambulatory in no apparent distress, wearing mask.

## 2021-08-10 ENCOUNTER — HOSPITAL ENCOUNTER (OUTPATIENT)
Dept: PHARMACY | Age: 86
Setting detail: THERAPIES SERIES
Discharge: HOME | End: 2021-08-10
Payer: MEDICARE

## 2021-08-10 DIAGNOSIS — I26.99 PULMONARY EMBOLISM, UNSPECIFIED CHRONICITY, UNSPECIFIED PULMONARY EMBOLISM TYPE, UNSPECIFIED WHETHER ACUTE COR PULMONALE PRESENT (HCC): ICD-10-CM

## 2021-08-10 DIAGNOSIS — Z79.01 ANTICOAGULATED ON COUMADIN: Primary | ICD-10-CM

## 2021-08-10 DIAGNOSIS — Z51.81 ENCOUNTER FOR THERAPEUTIC DRUG MONITORING: ICD-10-CM

## 2021-08-10 LAB — POC INR: 3.1 (ref 0.8–1.2)

## 2021-08-10 PROCEDURE — 99211 OFF/OP EST MAY X REQ PHY/QHP: CPT

## 2021-08-10 PROCEDURE — 36416 COLLJ CAPILLARY BLOOD SPEC: CPT

## 2021-08-10 PROCEDURE — 85610 PROTHROMBIN TIME: CPT

## 2021-08-10 NOTE — PROGRESS NOTES
Medication Management [address]  [phone] (phone)  [phone] (fax)    Ms. Jens Deleon is a 80 y.o.  female with history of PE, per referral from BENNIE Alvarado, who presents today for Warfarin monitoring and adjustment (5 week visit). Patient verifies current dosing regimen and tablet strength. States does not need renewed, yet. Unsure what day last week, but missed all evening pills, including Coumadin. Took an extra pill next 2 days - \"to make it up. \"  Reminded to call this clinic for instructions. Patient denies bleeding/swelling/chest pain. Has usual SOB/easy bruising. Has no swelling if wears compression socks bilat. No blood in urine or stool. Dietary changes: had more restaurant salad than usual (darker greens than she would have at home). No changes in medication/OTC agents/herbals, except for using slightly less Tylenol for back pain. No change in alcohol use or tobacco use. Change in activity level: slightly increased. Patient denies headaches/dizziness/lightheadedness/falls. No vomiting/diarrhea or acute illness. No procedures scheduled in the future at this time. [place]. Had second dose of COVID vaccine today. Card copied/scanned in per RMA. Assessment:   Lab Results   Component Value Date    INR 3.10 (H) 08/10/2021    INR 2.50 (H) 07/06/2021    INR 3.00 (H) 06/07/2021     INR supratherapeutic - goal 2-3. Recent Labs     08/10/21  1453   INR 3.10*       Plan:  POCT INR ordered/performed/result reviewed. 2 mg today, then continue Coumadin 4 mg daily PO. Recheck INR in 4 week(s). (Report given - orders entered by DANK Mathews, PharmD.)   Patient reminded to call the Anticoagulation Clinic with any signs or symptoms of bleeding or with any medication changes. Patient given instructions utilizing the teach back method. Advised extra caution. After visit summary printed and reviewed with patient.       Discharged ambulatory in no apparent distress, wearing mask.     For Pharmacy Admin Tracking Only     Intervention Detail: Dose Adjustment: 1, reason: Therapy De-escalation   Total # of Interventions Recommended: 1   Total # of Interventions Accepted: 1   Time Spent (min): 5

## 2021-08-19 ENCOUNTER — OFFICE VISIT (OUTPATIENT)
Dept: FAMILY MEDICINE CLINIC | Age: 86
End: 2021-08-19
Payer: MEDICARE

## 2021-08-19 VITALS
SYSTOLIC BLOOD PRESSURE: 120 MMHG | DIASTOLIC BLOOD PRESSURE: 70 MMHG | RESPIRATION RATE: 14 BRPM | TEMPERATURE: 98.4 F | WEIGHT: 164 LBS | HEIGHT: 64 IN | BODY MASS INDEX: 28 KG/M2 | OXYGEN SATURATION: 95 % | HEART RATE: 69 BPM

## 2021-08-19 DIAGNOSIS — E03.9 HYPOTHYROIDISM, UNSPECIFIED TYPE: ICD-10-CM

## 2021-08-19 DIAGNOSIS — Z79.01 CHRONIC ANTICOAGULATION: ICD-10-CM

## 2021-08-19 DIAGNOSIS — I10 ESSENTIAL HYPERTENSION: Primary | ICD-10-CM

## 2021-08-19 DIAGNOSIS — I27.82 OTHER CHRONIC PULMONARY EMBOLISM WITHOUT ACUTE COR PULMONALE (HCC): ICD-10-CM

## 2021-08-19 PROCEDURE — 99213 OFFICE O/P EST LOW 20 MIN: CPT | Performed by: NURSE PRACTITIONER

## 2021-08-19 ASSESSMENT — ENCOUNTER SYMPTOMS
ABDOMINAL PAIN: 0
RESPIRATORY NEGATIVE: 1
BACK PAIN: 1
CONSTIPATION: 1
ABDOMINAL DISTENTION: 0

## 2021-08-19 NOTE — PROGRESS NOTES
[facility] MEDICINE  [address] CR[facility] [address]  Dept: [phone]  Dept Fax: [phone]  Loc: [phone]    Buster Aguirre is a 80 y.o. femalewho presents today for her medical conditions/complaints as noted below. Emma Barriga c/o of Follow-up (med refill no concerns )      HPI:      Pt here for a 6 month check up. Denies any concerns. Hypothyroidism    HPI:  Currently treated for Hypothyroidism? Yes  Fatigue? No  Recent change in weight? No  Cold/Heat intolerance? No  Diarrhea/Constipation? Yes - some constipation at times takes stool softeners for this   Diaphoresis? No  Anxiety? No  Palpitations? No   Hair Loss? No    Has a history of PE and has ivc filter on chronic anticoagulation due to this. Takes diltiazem for bp control denies chest pain or heart palpitations. HTN    Does patient check BP regularly at home? - No  Current Medication regimen - diltiazem  Tolerating medications well? - yes    Shortness of breath or chest pain? No  Headache or visual complaints? No  Neurologic changes like confusion? No  Extremity edema? No    BP Readings from Last 3 Encounters:  08/19/21 : 120/70  03/18/21 : 121/70  09/22/20 : 138/78    Needs labs updated Lab Results       Component                Value               Date                       TSH                      1.150               03/20/2021                 T4FREE                   1.52                12/20/2019            Taking lipitor            Current Outpatient Medications   Medication Sig Dispense Refill    atorvastatin (LIPITOR) 20 MG tablet Take 1 tablet by mouth every evening Indications: Blood Cholesterol Abnormal 90 tablet 4    levothyroxine (SYNTHROID) 125 MCG tablet Take 1 tablet by mouth Daily Indications: Impaired Thyroid Function Pt wants only synthroid brand name  Takes MTWTF only.  5-14-18. 90 tablet 4    dilTIAZem (DILACOR XR) 180 MG extended release capsule Take 2 capsules by mouth daily Indications: High Blood Pressure Disorder 180 capsule 4    warfarin (JANTOVEN) 2 MG tablet TAKE AS DIRECTED BY [facility] 180 TABS = 90 DAYS 180 tablet 1    Polyethyl Glycol-Propyl Glycol (SYSTANE ULTRA OP) Apply 1 drop to eye 2 times daily      acetaminophen (TYLENOL) 500 MG tablet Take 500 mg by mouth every 6 hours as needed for Pain or Fever Don't take more than 3,000 mg each day.  SF 5000 PLUS 1.1 % CREA Place onto teeth 2 times daily       ketorolac (TORADOL) 10 MG tablet Take 1 tablet by mouth every 6 hours as needed for Pain (Patient not taking: Reported on 8/19/2021) 20 tablet 0     No current facility-administered medications for this visit. Past Medical History:   Diagnosis Date    Arthritis     wrist, back, neck, foot    Blood circulation, collateral     Breathing difficulty     Chronic kidney disease     DVT (deep venous thrombosis) (HCC)     History of blood transfusion 1960's    child birth    Hx of blood clots 2013? ??    left leg    Hyperlipidemia     Hypertension     Pneumonia     Prolonged emergence from general anesthesia     with hysterectomy? ?    Thyroid disease     Unspecified diseases of blood and blood-forming organs       Past Surgical History:   Procedure Laterality Date    CATARACT REMOVAL Right 12/03/2020    CATARACT REMOVAL Left 12/09/2020    COLONOSCOPY      last one before 2005???    CYSTOSCOPY  08/18/2015    HYDRODISTENTION, RPG, LEFT STENT    HYSTERECTOMY  1983??    SKIN BIOPSY      VARICOSE VEIN SURGERY  1969??    VENA CAVA FILTER PLACEMENT  01/01/2013     Family History   Problem Relation Age of Onset    Cancer Mother     Heart Disease Father      Social History     Tobacco Use    Smoking status: Never Smoker    Smokeless tobacco: Never Used   Substance Use Topics    Alcohol use: No        Allergies   Allergen Reactions    Bactrim [Sulfamethoxazole-Trimethoprim] Nausea Only    Tramadol Other (See Comments)     Patient felt \"not right\"    Codeine Nausea And Vomiting       Health Maintenance   Topic Date Due    Pneumococcal 65+ years Vaccine (1 of 1 - PPSV23) Never done    DTaP/Tdap/Td vaccine (1 - Tdap) 08/19/2022 (Originally 3/1/1953)    Shingles Vaccine (1 of 2) 08/19/2022 (Originally [birth year]/1/1984)    Flu vaccine (1) 09/01/2021    Annual Wellness Visit (AWV)  03/19/2022    Lipid screen  03/20/2022    TSH testing  03/20/2022    COVID-19 Vaccine  Completed    Hepatitis A vaccine  Aged Out    Hepatitis B vaccine  Aged Out    Hib vaccine  Aged Out    Meningococcal (ACWY) vaccine  Aged Out       Subjective:      Review of Systems   Constitutional: Negative for chills, fatigue and fever. HENT: Negative. Respiratory: Negative. Cardiovascular: Negative. Gastrointestinal: Positive for constipation (on occasion ). Negative for abdominal distention and abdominal pain. Genitourinary: Negative for difficulty urinating and dysuria. Musculoskeletal: Positive for arthralgias and back pain (has cervical spienal stenosis will take a tylenol from time to tome for it does not want any other meds or work up ). Skin: Negative. Neurological: Negative for dizziness, facial asymmetry, weakness and headaches. Psychiatric/Behavioral: Negative for self-injury, sleep disturbance and suicidal ideas. Objective:      /70   Pulse 69   Temp 98.4 °F (36.9 °C) (Oral)   Resp 14   Ht 5' 4\" (1.626 m)   Wt 164 lb (74.4 kg)   SpO2 95%   BMI 28.15 kg/m²      Physical Exam  Vitals and nursing note reviewed. Constitutional:       Appearance: She is not ill-appearing. HENT:      Nose: Nose normal.   Neck:      Vascular: No carotid bruit or JVD. Cardiovascular:      Rate and Rhythm: Normal rate and regular rhythm. Pulses: Normal pulses. Dorsalis pedis pulses are 2+ on the right side and 2+ on the left side.         Posterior tibial pulses are 2+ on the right side and 2+ on the left side. Heart sounds: Normal heart sounds. No murmur heard. Pulmonary:      Effort: Pulmonary effort is normal. No respiratory distress. Breath sounds: Normal breath sounds. Abdominal:      General: Abdomen is flat. Bowel sounds are normal. There is no distension. Palpations: Abdomen is soft. Tenderness: There is no abdominal tenderness. Musculoskeletal:         General: Normal range of motion. Right lower leg: No edema. Left lower leg: No edema. Skin:     General: Skin is warm and dry. Capillary Refill: Capillary refill takes less than 2 seconds. Neurological:      Mental Status: She is alert. Psychiatric:         Mood and Affect: Mood normal.         Behavior: Behavior normal.         Thought Content: Thought content normal.         Judgment: Judgment normal.          Assessment/Plan:           1. Essential hypertension    - Comprehensive Metabolic Panel; Future  - CBC With Auto Differential; Future  - Lipid Panel; Future    2. Other chronic pulmonary embolism without acute cor pulmonale (HCC)  Stable no new concerns. 3. Chronic anticoagulation    Continue with CC  4. Hypothyroidism, unspecified type  controlled  - TSH With Reflex Ft4; Future    Chronic conditions stable continue current meds  Return in about 7 months (around 3/19/2022) for MAW. Reccommended tobaccocessation options including pharmacologic methods, counseled great than 3 minutesduring this visit:  Yes[]  No  []       Patient given educational materials -see patient instructions. Discussed use, benefit, and side effects of prescribedmedications. All patient questions answered. Pt voiced understanding. Reviewedhealth maintenance. Instructed to continue current medications, diet and exercise. Patient agreed with treatment plan. Follow up as directed.        Electronicallysigned by LISA Garcia CNP on 8/19/2021 at 1:55 PM

## 2021-08-25 ENCOUNTER — HOSPITAL ENCOUNTER (OUTPATIENT)
Age: 86
Discharge: HOME | End: 2021-08-25
Payer: MEDICARE

## 2021-08-25 DIAGNOSIS — I10 ESSENTIAL HYPERTENSION: ICD-10-CM

## 2021-08-25 DIAGNOSIS — E03.9 HYPOTHYROIDISM, UNSPECIFIED TYPE: ICD-10-CM

## 2021-08-25 LAB
ALBUMIN SERPL-MCNC: 4.2 G/DL (ref 3.5–5.1)
ALP BLD-CCNC: 104 U/L (ref 38–126)
ALT SERPL-CCNC: 24 U/L (ref 11–66)
ANION GAP SERPL CALCULATED.3IONS-SCNC: 12 MEQ/L (ref 8–16)
AST SERPL-CCNC: 35 U/L (ref 5–40)
BASOPHILS # BLD: 0.3 %
BASOPHILS ABSOLUTE: 0 THOU/MM3 (ref 0–0.1)
BILIRUB SERPL-MCNC: 0.5 MG/DL (ref 0.3–1.2)
BUN BLDV-MCNC: 11 MG/DL (ref 7–22)
CALCIUM SERPL-MCNC: 9.3 MG/DL (ref 8.5–10.5)
CHLORIDE BLD-SCNC: 101 MEQ/L (ref 98–111)
CHOLESTEROL, TOTAL: 155 MG/DL (ref 100–199)
CO2: 27 MEQ/L (ref 23–33)
CREAT SERPL-MCNC: 0.6 MG/DL (ref 0.4–1.2)
EOSINOPHIL # BLD: 1.1 %
EOSINOPHILS ABSOLUTE: 0.1 THOU/MM3 (ref 0–0.4)
ERYTHROCYTE [DISTWIDTH] IN BLOOD BY AUTOMATED COUNT: 13.5 % (ref 11.5–14.5)
ERYTHROCYTE [DISTWIDTH] IN BLOOD BY AUTOMATED COUNT: 45.5 FL (ref 35–45)
GFR SERPL CREATININE-BSD FRML MDRD: > 90 ML/MIN/1.73M2
GLUCOSE BLD-MCNC: 144 MG/DL (ref 70–108)
HCT VFR BLD CALC: 45.3 % (ref 37–47)
HDLC SERPL-MCNC: 56 MG/DL
HEMOGLOBIN: 14.4 GM/DL (ref 12–16)
IMMATURE GRANS (ABS): 0.01 THOU/MM3 (ref 0–0.07)
IMMATURE GRANULOCYTES: 0.1 %
LDL CHOLESTEROL CALCULATED: 74 MG/DL
LYMPHOCYTES # BLD: 46.1 %
LYMPHOCYTES ABSOLUTE: 4.6 THOU/MM3 (ref 1–4.8)
MCH RBC QN AUTO: 29.1 PG (ref 26–33)
MCHC RBC AUTO-ENTMCNC: 31.8 GM/DL (ref 32.2–35.5)
MCV RBC AUTO: 91.5 FL (ref 81–99)
MONOCYTES # BLD: 7.4 %
MONOCYTES ABSOLUTE: 0.7 THOU/MM3 (ref 0.4–1.3)
NUCLEATED RED BLOOD CELLS: 0 /100 WBC
PLATELET # BLD: 204 THOU/MM3 (ref 130–400)
PMV BLD AUTO: 11.3 FL (ref 9.4–12.4)
POTASSIUM SERPL-SCNC: 4.4 MEQ/L (ref 3.5–5.2)
RBC # BLD: 4.95 MILL/MM3 (ref 4.2–5.4)
SEG NEUTROPHILS: 45 %
SEGMENTED NEUTROPHILS ABSOLUTE COUNT: 4.5 THOU/MM3 (ref 1.8–7.7)
SODIUM BLD-SCNC: 140 MEQ/L (ref 135–145)
TOTAL PROTEIN: 7.5 G/DL (ref 6.1–8)
TRIGL SERPL-MCNC: 125 MG/DL (ref 0–199)
TSH SERPL DL<=0.05 MIU/L-ACNC: 1.34 UIU/ML (ref 0.4–4.2)
WBC # BLD: 9.9 THOU/MM3 (ref 4.8–10.8)

## 2021-08-25 PROCEDURE — 80053 COMPREHEN METABOLIC PANEL: CPT

## 2021-08-25 PROCEDURE — 80061 LIPID PANEL: CPT

## 2021-08-25 PROCEDURE — 36415 COLL VENOUS BLD VENIPUNCTURE: CPT

## 2021-08-25 PROCEDURE — 84443 ASSAY THYROID STIM HORMONE: CPT

## 2021-08-25 PROCEDURE — 85025 COMPLETE CBC W/AUTO DIFF WBC: CPT

## 2021-08-26 ENCOUNTER — TELEPHONE (OUTPATIENT)
Dept: FAMILY MEDICINE CLINIC | Age: 86
End: 2021-08-26

## 2021-08-26 DIAGNOSIS — R73.9 ELEVATED BLOOD SUGAR: Primary | ICD-10-CM

## 2021-08-26 NOTE — TELEPHONE ENCOUNTER
Cristhian Bach from [facility] calling asking to please order the lab that needs added

## 2021-08-30 ENCOUNTER — TELEPHONE (OUTPATIENT)
Dept: FAMILY MEDICINE CLINIC | Age: 86
End: 2021-08-30

## 2021-08-30 NOTE — TELEPHONE ENCOUNTER
Please let pt knw her sugar was high when she had her recent labs done and I would like her to come into the office for an a1c to see if she is in diabetic range.

## 2021-09-01 ENCOUNTER — TELEPHONE (OUTPATIENT)
Dept: FAMILY MEDICINE CLINIC | Age: 86
End: 2021-09-01

## 2021-09-01 ENCOUNTER — NURSE ONLY (OUTPATIENT)
Dept: FAMILY MEDICINE CLINIC | Age: 86
End: 2021-09-01

## 2021-09-01 DIAGNOSIS — R73.9 ELEVATED BLOOD SUGAR: Primary | ICD-10-CM

## 2021-09-01 LAB — HBA1C MFR BLD: 6.2 % (ref 4.3–5.7)

## 2021-09-01 NOTE — TELEPHONE ENCOUNTER
Pt presents to office for nurse visit to have A1C checked           9/1/2021  1:46 PM - Eriberto, Darlene Incoming Lab Results From Soft    Component Value Ref Range & Units Status   Hemoglobin A1C 6.2High   4.3 - 5.7 % Final   Performed at [facility] under CLIA #  [id number]

## 2021-09-01 NOTE — TELEPHONE ENCOUNTER
Let pt know her sugar number is a little high, don't need to start diabetic meds just yet but she does need to cu t out sugars and decrease carbohydrate intake, we will recheck again in 3 months and if getting higher may need to consider diabetic meds at that time

## 2021-09-06 DIAGNOSIS — Z95.828 PRESENCE OF IVC FILTER: ICD-10-CM

## 2021-09-07 ENCOUNTER — HOSPITAL ENCOUNTER (OUTPATIENT)
Dept: PHARMACY | Age: 86
Setting detail: THERAPIES SERIES
Discharge: HOME | End: 2021-09-07
Payer: MEDICARE

## 2021-09-07 DIAGNOSIS — Z51.81 ENCOUNTER FOR THERAPEUTIC DRUG MONITORING: ICD-10-CM

## 2021-09-07 DIAGNOSIS — Z79.01 ANTICOAGULATED ON COUMADIN: ICD-10-CM

## 2021-09-07 DIAGNOSIS — I26.99 PULMONARY EMBOLISM, UNSPECIFIED CHRONICITY, UNSPECIFIED PULMONARY EMBOLISM TYPE, UNSPECIFIED WHETHER ACUTE COR PULMONALE PRESENT (HCC): Primary | ICD-10-CM

## 2021-09-07 LAB — POC INR: 3 (ref 0.8–1.2)

## 2021-09-07 PROCEDURE — 85610 PROTHROMBIN TIME: CPT

## 2021-09-07 PROCEDURE — 99211 OFF/OP EST MAY X REQ PHY/QHP: CPT

## 2021-09-07 PROCEDURE — 36416 COLLJ CAPILLARY BLOOD SPEC: CPT

## 2021-09-07 RX ORDER — WARFARIN SODIUM 2 MG/1
TABLET ORAL
Qty: 180 TABLET | Refills: 0 | Status: SHIPPED | OUTPATIENT
Start: 2021-09-07 | End: 2021-09-10

## 2021-09-07 NOTE — PROGRESS NOTES
Medication Management [address]   [phone] (phone)  [phone] (fax)    Ms. Swati Garnica is a 80 y.o.  female with history of PE, per referral from BENNIE Hancock, who presents today for Warfarin monitoring and adjustment (4 week visit). Patient verifies current dosing regimen and tablet strength. Will soon be getting last refill. No missed or extra doses. Patient denies bleeding/bruising/swelling/SOB/chest pain. Wears compression socks bilat. No blood in urine or stool. Dietary changes: eating fewer grapes to lower sugar. No changes in medication/OTC agents/herbals. No change in alcohol use or tobacco use. Change in activity level: slightly increased. Patient denies headaches/dizziness/lightheadedness/falls. No vomiting/diarrhea or acute illness. No procedures scheduled in the future at this time. Assessment:   Lab Results   Component Value Date    INR 3.00 (H) 09/07/2021    INR 3.10 (H) 08/10/2021    INR 2.50 (H) 07/06/2021     INR therapeutic - goal 2-3. Recent Labs     09/07/21  1513   INR 3.00*       Plan:  POCT INR ordered/performed/result reviewed. Continue Coumadin 4 mg daily PO. Recheck INR in 4 week(s). (Report given - orders entered by Jared Hernandez, PharmD.)  Patient reminded to call the Anticoagulation Clinic with any signs or symptoms of bleeding or with any medication changes. Patient given instructions utilizing the teach back method. After visit summary printed and reviewed with patient. Discharged ambulatory in no apparent distress, wearing mask.     For Pharmacy Admin Tracking Only        Time Spent (min): 5

## 2021-09-10 DIAGNOSIS — Z95.828 PRESENCE OF IVC FILTER: ICD-10-CM

## 2021-09-14 RX ORDER — WARFARIN SODIUM 2 MG/1
TABLET ORAL
Qty: 180 TABLET | Refills: 0 | Status: SHIPPED | OUTPATIENT
Start: 2021-09-14 | End: 2022-02-28 | Stop reason: SDUPTHER

## 2021-10-05 ENCOUNTER — HOSPITAL ENCOUNTER (OUTPATIENT)
Dept: PHARMACY | Age: 86
Setting detail: THERAPIES SERIES
Discharge: HOME | End: 2021-10-05
Payer: MEDICARE

## 2021-10-05 DIAGNOSIS — I26.99 PULMONARY EMBOLISM WITHOUT ACUTE COR PULMONALE, UNSPECIFIED CHRONICITY, UNSPECIFIED PULMONARY EMBOLISM TYPE (HCC): Primary | ICD-10-CM

## 2021-10-05 DIAGNOSIS — Z51.81 ENCOUNTER FOR THERAPEUTIC DRUG MONITORING: ICD-10-CM

## 2021-10-05 DIAGNOSIS — Z79.01 ANTICOAGULATED ON COUMADIN: ICD-10-CM

## 2021-10-05 LAB — POC INR: 2.4 (ref 0.8–1.2)

## 2021-10-05 PROCEDURE — 99211 OFF/OP EST MAY X REQ PHY/QHP: CPT

## 2021-10-05 PROCEDURE — 36416 COLLJ CAPILLARY BLOOD SPEC: CPT

## 2021-10-05 PROCEDURE — 85610 PROTHROMBIN TIME: CPT

## 2021-11-02 ENCOUNTER — HOSPITAL ENCOUNTER (OUTPATIENT)
Dept: PHARMACY | Age: 86
Setting detail: THERAPIES SERIES
Discharge: HOME | End: 2021-11-02
Payer: MEDICARE

## 2021-11-02 DIAGNOSIS — Z51.81 ENCOUNTER FOR THERAPEUTIC DRUG MONITORING: ICD-10-CM

## 2021-11-02 DIAGNOSIS — Z79.01 ANTICOAGULATED ON COUMADIN: ICD-10-CM

## 2021-11-02 DIAGNOSIS — I26.99 PULMONARY EMBOLISM, UNSPECIFIED CHRONICITY, UNSPECIFIED PULMONARY EMBOLISM TYPE, UNSPECIFIED WHETHER ACUTE COR PULMONALE PRESENT (HCC): Primary | ICD-10-CM

## 2021-11-02 LAB — POC INR: 2.6 (ref 0.8–1.2)

## 2021-11-02 PROCEDURE — 85610 PROTHROMBIN TIME: CPT

## 2021-11-02 PROCEDURE — 99211 OFF/OP EST MAY X REQ PHY/QHP: CPT

## 2021-11-02 PROCEDURE — 36416 COLLJ CAPILLARY BLOOD SPEC: CPT

## 2021-11-02 NOTE — PROGRESS NOTES
Medication Management [address]  [phone] (phone)  [phone] (fax)    Ms. Staci Mack is a 80 y.o.  female with history of PE, per referral from BENNIE Sena, who presents today for Warfarin monitoring and adjustment (4 week visit). Patient verifies current dosing regimen and tablet strength. No missed or extra doses. Patient denies bleeding/bruising/chest pain. Has usual SOB. Had some swelling of legs after car ride, that resolved. Also bought new compression socks. No blood in urine or stool. No dietary changes. No changes in medication/OTC agents/herbals. Takes usual Tylenol before leaving house. No change in alcohol use or tobacco use. No change in activity level. Patient denies headaches/dizziness/lightheadedness/falls. No vomiting/diarrhea or acute illness. No procedures scheduled in the future at this time. Assessment:   Lab Results   Component Value Date    INR 2.60 (H) 11/02/2021    INR 2.40 (H) 10/05/2021    INR 3.00 (H) 09/07/2021     INR therapeutic - goal 2-3. Recent Labs     11/02/21  1507   INR 2.60*       Plan:  POCT INR ordered/performed/result reviewed. Continue Coumadin 4 mg daily PO. Recheck INR in 4 week(s); patient wanted 6 weeks - agreed to 5. Patient reminded to call the Anticoagulation Clinic with any signs or symptoms of bleeding or with any medication changes. Patient given instructions utilizing the teach back method. After visit summary printed and reviewed with patient. Discharged ambulatory in no apparent distress,  wearing mask.

## 2021-11-30 ENCOUNTER — TELEPHONE (OUTPATIENT)
Dept: FAMILY MEDICINE CLINIC | Age: 86
End: 2021-11-30

## 2021-11-30 DIAGNOSIS — N63.20 LEFT BREAST LUMP: Primary | ICD-10-CM

## 2021-11-30 DIAGNOSIS — N64.4 BREAST PAIN, LEFT: ICD-10-CM

## 2021-11-30 NOTE — TELEPHONE ENCOUNTER
Cecy from [facility] states that the patient is scheduled for a left breast mammogram due to lump found in the left breast over the weekend  Marisol Julien mentioned that the patient and her family have been trying to reach the office without success and asked that the office reach out to the patient     I spoke with the patient who stated that she and her family have been calling and at one point was informed that the office had been closed permanently   Patient stated that she noticed a lump in her left breast over the weekend and stated that when she went to examine it in the mirror she found it to be black and blue in color as well.  Patient states that it could have been possible that she bumped up against something as she had been very active with the holiday and all and by her being on blood thinners it is very easy for her to bruise     Patient will need an order for the mammogram to be done     Please advise

## 2021-12-01 ENCOUNTER — TELEPHONE (OUTPATIENT)
Dept: FAMILY MEDICINE CLINIC | Age: 86
End: 2021-12-01

## 2021-12-01 DIAGNOSIS — N64.4 BREAST PAIN, LEFT: ICD-10-CM

## 2021-12-01 DIAGNOSIS — N63.20 LEFT BREAST MASS: Primary | ICD-10-CM

## 2021-12-01 NOTE — TELEPHONE ENCOUNTER
Order in epic  Future Appointments   Date Time Provider Samuel Jacquelyn   12/2/2021  1:30 PM STR MAMMOGRAPHY RM 1 [facility]   12/2/2021  2:00 PM STR [facility] RM 2 STRZ WOMEN STR Radiolog   12/7/2021  3:00 PM Catalina Huerta RN STR [facility]   3/29/2022  2:40 PM LISA [place]

## 2021-12-01 NOTE — TELEPHONE ENCOUNTER
----- Message from Dileep Bui sent at 12/1/2021 11:07 AM EST -----  Subject: Message to Provider    QUESTIONS  Information for Provider? Pt is scheduled for ultrasound, however the   order needs to be changed to diagnostic bilateral mammogram. Use same   diagnoses and ultrasound indicated. When calling [facility] Ed[facility] Coppola   select option 3  ---------------------------------------------------------------------------  --------------  CALL BACK INFO  What is the best way for the office to contact you? OK to leave message on   voicemail  Preferred Call Back Phone Number? [phone]  ---------------------------------------------------------------------------  --------------  SCRIPT ANSWERS  Relationship to Patient? Third Party  Representative Name?  Moy Edil Coppola

## 2021-12-02 ENCOUNTER — HOSPITAL ENCOUNTER (OUTPATIENT)
Dept: WOMENS IMAGING | Age: 86
Discharge: HOME | End: 2021-12-02
Payer: MEDICARE

## 2021-12-02 DIAGNOSIS — N63.0 BREAST MASS: ICD-10-CM

## 2021-12-02 DIAGNOSIS — N63.20 LEFT BREAST MASS: ICD-10-CM

## 2021-12-02 DIAGNOSIS — N64.4 BREAST PAIN, LEFT: ICD-10-CM

## 2021-12-02 PROCEDURE — G0279 TOMOSYNTHESIS, MAMMO: HCPCS

## 2021-12-02 PROCEDURE — 76642 ULTRASOUND BREAST LIMITED: CPT

## 2021-12-07 ENCOUNTER — HOSPITAL ENCOUNTER (OUTPATIENT)
Dept: PHARMACY | Age: 86
Setting detail: THERAPIES SERIES
Discharge: HOME | End: 2021-12-07
Payer: MEDICARE

## 2021-12-07 DIAGNOSIS — Z79.01 ANTICOAGULATED ON COUMADIN: ICD-10-CM

## 2021-12-07 DIAGNOSIS — I26.99 PULMONARY EMBOLISM, UNSPECIFIED CHRONICITY, UNSPECIFIED PULMONARY EMBOLISM TYPE, UNSPECIFIED WHETHER ACUTE COR PULMONALE PRESENT (HCC): Primary | ICD-10-CM

## 2021-12-07 DIAGNOSIS — Z51.81 ENCOUNTER FOR THERAPEUTIC DRUG MONITORING: ICD-10-CM

## 2021-12-07 LAB — POC INR: 2.9 (ref 0.8–1.2)

## 2021-12-07 PROCEDURE — 85610 PROTHROMBIN TIME: CPT

## 2021-12-07 PROCEDURE — 36416 COLLJ CAPILLARY BLOOD SPEC: CPT

## 2021-12-07 PROCEDURE — 99211 OFF/OP EST MAY X REQ PHY/QHP: CPT

## 2021-12-07 NOTE — PROGRESS NOTES
Medication Management [address]  [phone] (phone)  [phone] (fax)    Ms. David Mckoy is a 80 y.o.  female with history of PE, per referral from BENNIE Lynn, who presents today for Warfarin monitoring and adjustment (5 week visit). Patient verifies current dosing regimen and tablet strength. States does not need prescription renewed at this time. No missed or extra doses. Patient denies bleeding/chest pain. Has usual SOB. Has usual minor swelling of legs - wears compression socks. States she developed bruise/lump on left chest day after Thanksgiving (had tests done since). Bruise is fading. States she struggled with turkey on Thanksgiving - may have hit herself. No blood in urine or stool. Dietary changes: had small serving of cranberry relish 11/25, Thanksgiving. No changes in medication/OTC agents/herbals, except for taking 3 Tylenol in one day for left hip/lower back pain (normally doesn't take more than 2/day). No change in alcohol use or tobacco use. No change in activity level. Patient denies headaches/dizziness/lightheadedness/falls. No vomiting/diarrhea or acute illness. No procedures scheduled in the future at this time. Assessment:   Lab Results   Component Value Date    INR 2.90 (H) 12/07/2021    INR 2.60 (H) 11/02/2021    INR 2.40 (H) 10/05/2021     INR therapeutic - goal 2-3. Recent Labs     12/07/21  1505   INR 2.90*     Plan:  POCT INR ordered/performed/result reviewed. Continue Coumadin 4 mg daily PO. Recheck INR in 5 week(s). Patient reminded to call the Anticoagulation Clinic with any signs or symptoms of bleeding or with any medication changes. Patient given instructions utilizing the teach back method. After visit summary printed and reviewed with patient. Discharged ambulatory in no apparent distress, wearing mask. 0

## 2021-12-20 ENCOUNTER — HOSPITAL ENCOUNTER (EMERGENCY)
Age: 86
Discharge: HOME | End: 2021-12-20
Payer: MEDICARE

## 2021-12-20 VITALS
BODY MASS INDEX: 28.32 KG/M2 | DIASTOLIC BLOOD PRESSURE: 82 MMHG | SYSTOLIC BLOOD PRESSURE: 145 MMHG | TEMPERATURE: 97.6 F | OXYGEN SATURATION: 93 % | WEIGHT: 165 LBS | HEART RATE: 87 BPM | RESPIRATION RATE: 16 BRPM

## 2021-12-20 DIAGNOSIS — U07.1 COVID-19: Primary | ICD-10-CM

## 2021-12-20 DIAGNOSIS — U07.1 COVID-19: ICD-10-CM

## 2021-12-20 LAB — SARS-COV-2, NAA: DETECTED

## 2021-12-20 PROCEDURE — 6370000000 HC RX 637 (ALT 250 FOR IP): Performed by: NURSE PRACTITIONER

## 2021-12-20 PROCEDURE — 87635 SARS-COV-2 COVID-19 AMP PRB: CPT

## 2021-12-20 PROCEDURE — 99213 OFFICE O/P EST LOW 20 MIN: CPT

## 2021-12-20 PROCEDURE — 99213 OFFICE O/P EST LOW 20 MIN: CPT | Performed by: NURSE PRACTITIONER

## 2021-12-20 RX ORDER — SODIUM CHLORIDE 9 MG/ML
25 INJECTION, SOLUTION INTRAVENOUS PRN
OUTPATIENT
Start: 2021-12-20

## 2021-12-20 RX ORDER — ONDANSETRON 2 MG/ML
8 INJECTION INTRAMUSCULAR; INTRAVENOUS
Status: CANCELLED | OUTPATIENT
Start: 2021-12-20

## 2021-12-20 RX ORDER — PREDNISONE 20 MG/1
20 TABLET ORAL 2 TIMES DAILY
Qty: 10 TABLET | Refills: 0 | Status: SHIPPED | OUTPATIENT
Start: 2021-12-20 | End: 2021-12-25

## 2021-12-20 RX ORDER — ONDANSETRON 4 MG/1
4 TABLET, ORALLY DISINTEGRATING ORAL ONCE
Status: COMPLETED | OUTPATIENT
Start: 2021-12-20 | End: 2021-12-20

## 2021-12-20 RX ORDER — SODIUM CHLORIDE 9 MG/ML
INJECTION, SOLUTION INTRAVENOUS CONTINUOUS
Status: CANCELLED | OUTPATIENT
Start: 2021-12-20

## 2021-12-20 RX ORDER — ONDANSETRON 2 MG/ML
4 INJECTION INTRAMUSCULAR; INTRAVENOUS ONCE
Status: DISCONTINUED | OUTPATIENT
Start: 2021-12-20 | End: 2021-12-20

## 2021-12-20 RX ORDER — SODIUM CHLORIDE 0.9 % (FLUSH) 0.9 %
5-40 SYRINGE (ML) INJECTION PRN
Status: CANCELLED | OUTPATIENT
Start: 2021-12-20

## 2021-12-20 RX ORDER — ALBUTEROL SULFATE 90 UG/1
4 AEROSOL, METERED RESPIRATORY (INHALATION) PRN
Status: CANCELLED | OUTPATIENT
Start: 2021-12-20

## 2021-12-20 RX ORDER — DIPHENHYDRAMINE HYDROCHLORIDE 50 MG/ML
50 INJECTION INTRAMUSCULAR; INTRAVENOUS
Status: CANCELLED | OUTPATIENT
Start: 2021-12-20

## 2021-12-20 RX ORDER — ACETAMINOPHEN 325 MG/1
650 TABLET ORAL
Status: CANCELLED | OUTPATIENT
Start: 2021-12-20

## 2021-12-20 RX ORDER — ONDANSETRON 4 MG/1
4 TABLET, ORALLY DISINTEGRATING ORAL EVERY 8 HOURS PRN
Qty: 40 TABLET | Refills: 0 | Status: SHIPPED | OUTPATIENT
Start: 2021-12-20

## 2021-12-20 RX ADMIN — ONDANSETRON 4 MG: 4 TABLET, ORALLY DISINTEGRATING ORAL at 14:52

## 2021-12-20 ASSESSMENT — ENCOUNTER SYMPTOMS
VOMITING: 1
DIARRHEA: 0
COUGH: 1
RHINORRHEA: 1
NAUSEA: 1
SORE THROAT: 1
SHORTNESS OF BREATH: 0
CHEST TIGHTNESS: 0

## 2021-12-20 NOTE — ED TRIAGE NOTES
Patient here for covid testing. Symptoms since Monday, with runny nose, watery eyes, Thurs. , nausea, headache, dry cough, sore throat, dry heaves,robitussin DM,tyelnol taken.

## 2021-12-20 NOTE — ED NOTES
Nasal swab for covid obtained. Labeled, taken to lab, tolerated well. Nurse wearing PPE.      Luisa Ko LPN  13/64/71 2686

## 2021-12-20 NOTE — ED PROVIDER NOTES
[facility]  Urgent Care Encounter       CHIEF COMPLAINT       Chief Complaint   Patient presents with    Cough    Pharyngitis    Headache    Nasal Congestion    Concern For COVID-19       Nurses Notes reviewed and I agree except as noted in the HPI. HISTORY OF PRESENT ILLNESS   Isabel Powell is a 80 y.o. female who presents to the [facility] urgent care for evaluation of cough and nasal congestion. She is requesting a COVID-19 test.  She does report that she is vaccinated for COVID-19. She reports her symptoms as cough, pharyngitis, headache, nasal congestion, rhinorrhea, and fatigue. She denies loss of taste or smell. She does report nausea and emesis. The history is provided by the patient. No  was used. REVIEW OF SYSTEMS     Review of Systems   Constitutional: Positive for fever. Negative for activity change, appetite change, chills and fatigue. HENT: Positive for congestion, rhinorrhea and sore throat. Negative for ear discharge and ear pain. Respiratory: Positive for cough. Negative for chest tightness and shortness of breath. Cardiovascular: Negative for chest pain. Gastrointestinal: Positive for nausea and vomiting. Negative for diarrhea. Genitourinary: Negative for dysuria. Skin: Negative for rash. Allergic/Immunologic: Negative for environmental allergies and food allergies. Neurological: Negative for dizziness and headaches. PAST MEDICAL HISTORY         Diagnosis Date    Arthritis     wrist, back, neck, foot    Blood circulation, collateral     Breathing difficulty     Chronic kidney disease     DVT (deep venous thrombosis) (HCC)     History of blood transfusion 1960's    child birth    Hx of blood clots 2013? ??    left leg    Hyperlipidemia     Hypertension     Pneumonia     Prolonged emergence from general anesthesia     with hysterectomy? ?    Thyroid disease     Unspecified diseases of blood and 01/30/2018    Influenza, Quadv, IM, PF (6 mo and older Fluzone, Flulaval, Fluarix, and 3 yrs and older Afluria) 01/30/2018    PPD Test 12/11/2013       FAMILY HISTORY     Patient's family history includes Breast Cancer in her maternal aunt and maternal grandmother; Breast Cancer (age of onset: 78) in her mother; Cancer in her mother; Heart Disease in her father. SOCIAL HISTORY     Patient  reports that she has never smoked. She has never used smokeless tobacco. She reports that she does not drink alcohol and does not use drugs. PHYSICAL EXAM     ED TRIAGE VITALS  BP: (!) 145/82, Temp: 97.6 °F (36.4 °C), Pulse: 87, Resp: 16, SpO2: 93 %,Estimated body mass index is 28.32 kg/m² as calculated from the following:    Height as of 8/19/21: 5' 4\" (1.626 m). Weight as of this encounter: 165 lb (74.8 kg). ,No LMP recorded. Patient has had a hysterectomy. Physical Exam  Vitals and nursing note reviewed. Constitutional:       General: She is not in acute distress. Appearance: Normal appearance. She is not ill-appearing, toxic-appearing or diaphoretic. HENT:      Head: Normocephalic. Right Ear: Ear canal and external ear normal.      Left Ear: Ear canal and external ear normal.      Nose: Nose normal. No congestion or rhinorrhea. Mouth/Throat:      Mouth: Mucous membranes are moist.      Pharynx: Oropharynx is clear. No oropharyngeal exudate or posterior oropharyngeal erythema. Cardiovascular:      Rate and Rhythm: Normal rate. Pulses: Normal pulses. Pulmonary:      Effort: Pulmonary effort is normal. No respiratory distress. Breath sounds: No stridor. No wheezing or rhonchi. Abdominal:      General: Abdomen is flat. Bowel sounds are normal.      Palpations: Abdomen is soft. Musculoskeletal:         General: No swelling or tenderness. Normal range of motion. Cervical back: Normal range of motion. Neurological:      General: No focal deficit present.       Mental Status: She is alert and oriented to person, place, and time. Psychiatric:         Mood and Affect: Mood normal.         Behavior: Behavior normal.         DIAGNOSTIC RESULTS     Labs:  Results for orders placed or performed during the hospital encounter of 12/20/21   COVID-19, Rapid   Result Value Ref Range    SARS-CoV-2, DORY DETECTED (AA) NOT DETECTED       IMAGING:    No orders to display         EKG: None      URGENT CARE COURSE:     Vitals:    12/20/21 1445   BP: (!) 145/82   Pulse: 87   Resp: 16   Temp: 97.6 °F (36.4 °C)   TempSrc: Temporal   SpO2: 93%   Weight: 165 lb (74.8 kg)       Medications   ondansetron (ZOFRAN-ODT) disintegrating tablet 4 mg (4 mg Oral Given 12/20/21 1452)            PROCEDURES:  None    FINAL IMPRESSION      1. COVID-19          DISPOSITION/ PLAN     Patient seen and evaluated for cough and above symptoms. A rapid Covid test was obtained and positive. Patient is instructed to isolate for 10 days from symptom onset. She is provided prescription for Zofran along with prednisone. We did also order Regeneron-monoclonal antibodies. We did discuss that these are not approved by the FDA, only emergency use only. We did discuss several risks and benefits. She is agreeable to obtain an infusion of these antibodies. She is instructed is over-the-counter Tylenol and Motrin for pain or fever. Instructed to follow-up with her PCP in 3 to 5 days new worsening symptoms. She is agreeable to the above plan and denies questions or concerns at this time.       PATIENT REFERRED TO:  LISA Ashraf CNP  [address] [place] [id number]      DISCHARGE MEDICATIONS:  Discharge Medication List as of 12/20/2021  3:13 PM      START taking these medications    Details   ondansetron (ZOFRAN ODT) 4 MG disintegrating tablet Take 1 tablet by mouth every 8 hours as needed for Nausea or Vomiting, Disp-40 tablet, R-0Normal      predniSONE (DELTASONE) 20 MG tablet Take 1 tablet by mouth 2 times daily for 5 days, Disp-10 tablet, R-0Normal             Discharge Medication List as of 12/20/2021  3:13 PM          Discharge Medication List as of 12/20/2021  3:13 PM          LISA Graza CNP    (Please note that portions of this note were completed with a voice recognition program. Efforts were made to edit the dictations but occasionally words are mis-transcribed.)           LISA Garza CNP  12/20/21 1521

## 2021-12-21 ENCOUNTER — HOSPITAL ENCOUNTER (OUTPATIENT)
Dept: NURSING | Age: 86
Discharge: HOME | End: 2021-12-21
Payer: MEDICARE

## 2021-12-21 ENCOUNTER — CARE COORDINATION (OUTPATIENT)
Dept: CARE COORDINATION | Age: 86
End: 2021-12-21

## 2021-12-21 VITALS
RESPIRATION RATE: 18 BRPM | HEART RATE: 57 BPM | OXYGEN SATURATION: 92 % | DIASTOLIC BLOOD PRESSURE: 64 MMHG | SYSTOLIC BLOOD PRESSURE: 128 MMHG

## 2021-12-21 DIAGNOSIS — U07.1 COVID-19: Primary | ICD-10-CM

## 2021-12-21 PROCEDURE — 2580000003 HC RX 258: Performed by: NURSE PRACTITIONER

## 2021-12-21 PROCEDURE — 2500000003 HC RX 250 WO HCPCS: Performed by: NURSE PRACTITIONER

## 2021-12-21 PROCEDURE — M0245 HC IV INFUSION BAMLANIVIMAB & ETESEVIMAB W/MONITORING: HCPCS

## 2021-12-21 PROCEDURE — 6360000002 HC RX W HCPCS: Performed by: NURSE PRACTITIONER

## 2021-12-21 RX ORDER — ACETAMINOPHEN 325 MG/1
650 TABLET ORAL
Status: ACTIVE | OUTPATIENT
Start: 2021-12-21 | End: 2021-12-21

## 2021-12-21 RX ORDER — SODIUM CHLORIDE 9 MG/ML
INJECTION, SOLUTION INTRAVENOUS CONTINUOUS
Status: DISCONTINUED | OUTPATIENT
Start: 2021-12-21 | End: 2021-12-22 | Stop reason: HOSPADM

## 2021-12-21 RX ORDER — SODIUM CHLORIDE 9 MG/ML
INJECTION, SOLUTION INTRAVENOUS CONTINUOUS
Status: CANCELLED | OUTPATIENT
Start: 2021-12-21

## 2021-12-21 RX ORDER — DIPHENHYDRAMINE HYDROCHLORIDE 50 MG/ML
50 INJECTION INTRAMUSCULAR; INTRAVENOUS
Status: ACTIVE | OUTPATIENT
Start: 2021-12-21 | End: 2021-12-21

## 2021-12-21 RX ORDER — ONDANSETRON 2 MG/ML
8 INJECTION INTRAMUSCULAR; INTRAVENOUS
Status: CANCELLED | OUTPATIENT
Start: 2021-12-21

## 2021-12-21 RX ORDER — SODIUM CHLORIDE 9 MG/ML
25 INJECTION, SOLUTION INTRAVENOUS PRN
OUTPATIENT
Start: 2021-12-21

## 2021-12-21 RX ORDER — SODIUM CHLORIDE 0.9 % (FLUSH) 0.9 %
5-40 SYRINGE (ML) INJECTION PRN
Status: CANCELLED | OUTPATIENT
Start: 2021-12-21

## 2021-12-21 RX ORDER — ALBUTEROL SULFATE 90 UG/1
4 AEROSOL, METERED RESPIRATORY (INHALATION) PRN
Status: DISCONTINUED | OUTPATIENT
Start: 2021-12-21 | End: 2021-12-22 | Stop reason: HOSPADM

## 2021-12-21 RX ORDER — DIPHENHYDRAMINE HYDROCHLORIDE 50 MG/ML
50 INJECTION INTRAMUSCULAR; INTRAVENOUS
Status: CANCELLED | OUTPATIENT
Start: 2021-12-21

## 2021-12-21 RX ORDER — ONDANSETRON 2 MG/ML
8 INJECTION INTRAMUSCULAR; INTRAVENOUS
Status: ACTIVE | OUTPATIENT
Start: 2021-12-21 | End: 2021-12-21

## 2021-12-21 RX ORDER — ALBUTEROL SULFATE 90 UG/1
4 AEROSOL, METERED RESPIRATORY (INHALATION) PRN
Status: CANCELLED | OUTPATIENT
Start: 2021-12-21

## 2021-12-21 RX ORDER — ACETAMINOPHEN 325 MG/1
650 TABLET ORAL
Status: CANCELLED | OUTPATIENT
Start: 2021-12-21

## 2021-12-21 RX ORDER — SODIUM CHLORIDE 0.9 % (FLUSH) 0.9 %
5-40 SYRINGE (ML) INJECTION PRN
Status: DISCONTINUED | OUTPATIENT
Start: 2021-12-21 | End: 2021-12-22 | Stop reason: HOSPADM

## 2021-12-21 RX ADMIN — SODIUM CHLORIDE: 9 INJECTION, SOLUTION INTRAVENOUS at 14:00

## 2021-12-21 RX ADMIN — SODIUM CHLORIDE: 9 INJECTION, SOLUTION INTRAVENOUS at 13:59

## 2021-12-21 ASSESSMENT — PAIN - FUNCTIONAL ASSESSMENT: PAIN_FUNCTIONAL_ASSESSMENT: 0-10

## 2021-12-21 NOTE — PROGRESS NOTES
_m_ Safety:       (Environmental)   [place] to environment   Ensure ID band is correct and in place/ allergy band as needed   Assess for fall risk   Initiate fall precautions as applicable (fall band, side rails, etc.)   Call light within reach   Bed in low position/ wheels locked    ___m_ Pain:        Assess pain level and characteristics   Administer analgesics as ordered   Assess effectiveness of pain management and report to MD as needed    _m___ Knowledge Deficit:   Assess baseline knowledge   Provide teaching at level of understanding   Provide teaching via preferred learning method   Evaluate teaching effectiveness    ___m_ Hemodynamic/Respiratory Status:       (Pre and Post Procedure Monitoring)   Assess/Monitor vital signs and LOC   Assess Baseline SpO2 prior to any sedation   Obtain weight/height   Assess vital signs/ LOC until patient meets discharge criteria   Monitor procedure site and notify MD of any issues    _

## 2021-12-21 NOTE — CARE COORDINATION
Patient contacted regarding COVID-19 diagnosis and monoclonal antibody infusion follow up. Discussed COVID-19 related testing which was available at this time. Test results were positive. Patient informed of results, if available? Patient shared she is aware of positive results prior to this ACM f/u call being completed. Ambulatory Care Manager contacted the patient by telephone to perform post discharge assessment. Call within 2 business days of discharge: Yes. Verified name and  with patient as identifiers. Provided introduction to self, and explanation of the CTN/ACM role, and reason for call due to risk factors for infection and/or exposure to COVID-19. Symptoms reviewed with patient who verbalized the following symptoms: fatigue, cough, shortness of breath, no new symptoms and no worsening symptoms. Due to no new or worsening symptoms encounter was not routed to provider for escalation. Discussed follow-up appointments. If no appointment was previously scheduled, appointment scheduling offered: Patient is agreeable to PCP f/u appointment and will ask Axel Monreal, to reach out and assist with scheduling. [facility] follow up appointment(s):   Future Appointments   Date Time Provider Samuel Valladares   2021  2:00 PM STR EXAM ROOM 3 COVID INFUSION STR[facility] OP NURS [place] HOD   2022  3:00 PM Anastasia Aburto RN [facility] MED MGMT [address]   3/29/2022  2:40 PM Ilda Cifuentes APRN - [id number] [address]     Non-[facility] follow up appointment(s): N/A     Non-face-to-face services provided:  Obtained and reviewed discharge summary and/or continuity of care documents  Education of patient/family/caregiver/guardian to support self-management-COVID-19 education and zone information reviewed with patient. Patient educated on signs/symptoms to call and report as well as the importance of early symptom recognition.   ACM educated on the need to self quarantine as directed and encouraged patient to f/u with the health department. Advance Care Planning:   Does patient have an Advance Directive:  reviewed and current. ACP note completed. Educated patient about risk for severe COVID-19 due to risk factors according to CDC guidelines. ACM reviewed discharge instructions, medical action plan and red flag symptoms with the patient who verbalized understanding. Discussed COVID vaccination status: Yes. Education provided on COVID-19 vaccination as appropriate. Discussed exposure protocols and quarantine with CDC Guidelines. Patient was given an opportunity to verbalize any questions and concerns and agrees to contact ACM or health care provider for questions related to their healthcare. Reviewed and educated patient on any new and changed medications related to discharge diagnosis     Was patient discharged with a pulse oximeter? No     ACM provided contact information. No further follow-up call identified per patient's request.      Patient called for covid-19 f/u s/p recent  visit for c/o cough, congestion, and headache. Patient denied any new/change/worsening of symptoms. Patient shared she is feeling better today and she has started prednisone as directed. Patient denied any questions re: her discharge instructions. COVID-19 education and zone information was reviewed with patient. Patient was educated on signs/symptoms to report as well as the importance of early symptom recognition. Patient verbalized understanding. ACM reviewed need to self quarantine as directed and patient acknowledged understanding. Patient was reminded to call covid-19 hotline number with any new symptoms or questions/concerns. Patient verbalized understanding. Patient denied any other questions, concerns, or needs. Patient declines any further f/u and she was instructed to call with any questions or concerns.

## 2022-01-11 ENCOUNTER — HOSPITAL ENCOUNTER (OUTPATIENT)
Dept: PHARMACY | Age: 87
Setting detail: THERAPIES SERIES
Discharge: HOME | End: 2022-01-11
Payer: MEDICARE

## 2022-01-11 DIAGNOSIS — Z51.81 ENCOUNTER FOR THERAPEUTIC DRUG MONITORING: ICD-10-CM

## 2022-01-11 DIAGNOSIS — I26.99 PULMONARY EMBOLISM, UNSPECIFIED CHRONICITY, UNSPECIFIED PULMONARY EMBOLISM TYPE, UNSPECIFIED WHETHER ACUTE COR PULMONALE PRESENT (HCC): Primary | ICD-10-CM

## 2022-01-11 DIAGNOSIS — Z79.01 ANTICOAGULATED ON COUMADIN: ICD-10-CM

## 2022-01-11 LAB — POC INR: 3.3 (ref 0.8–1.2)

## 2022-01-11 PROCEDURE — 85610 PROTHROMBIN TIME: CPT

## 2022-01-11 PROCEDURE — 36416 COLLJ CAPILLARY BLOOD SPEC: CPT

## 2022-01-11 PROCEDURE — 99212 OFFICE O/P EST SF 10 MIN: CPT

## 2022-01-11 NOTE — PROGRESS NOTES
Medication Management [address]  [phone] (phone)  [phone] (fax)    Ms. Shahnaz Milligan is a 80 y.o.  female with history of PE, per referral from BENNIE Motley, who presents today for Warfarin monitoring and adjustment (5 week visit). Patient verifies current dosing regimen and tablet strength. States does not need prescription renewed, yet. No missed or extra doses. Patient denies bleeding/bruising/swelling. Has usual slight SOB. Wears support socks bilat. No blood in urine or stool. Dietary changes: when ill, some days ate nothing or couldn't keep it down. Diet still not back to usual (would normally be eating salads/green vegetables). No change in alcohol use or tobacco use. Change in activity level: had much less activity with illness - better now, but not back to usual level. Patient denies falls. Went to [facility] 12/20 - COVID+. Had vomiting, headache, dizziness, chest pain. Was ordered Zofran and 5 days of Prednisone 20 mg BID (stressed importance of calling this clinic). Did receive Regeneron infusion. Still using Zofran for nausea. Has taken nothing for diarrhea past 2 days - none yet today. No procedures scheduled in the future at this time. Assessment:   Lab Results   Component Value Date    INR 3.30 (H) 01/11/2022    INR 2.90 (H) 12/07/2021    INR 2.60 (H) 11/02/2021     INR supratherapeutic - goal 2-3. Recent Labs     01/11/22  1501   INR 3.30*       Plan:  POCT INR ordered/performed/result reviewed. 2 mg today, then continue Coumadin 4 mg daily PO. Recheck INR in 2 week(s). (Report given - orders entered by Santi Bateman RPh., PharmD.)  Patient reminded to call the Anticoagulation Clinic with any signs or symptoms of bleeding or with any medication changes. Patient given instructions utilizing the teach back method. After visit summary printed and reviewed with patient. Advised extra caution.   Discharged ambulatory in no apparent distress, wearing mask.     For Pharmacy Admin Tracking Only     Intervention Detail: Dose Adjustment: 1, reason: Therapy De-escalation   Total # of Interventions Recommended: 1   Total # of Interventions Accepted: 1   Time Spent (min): 1

## 2022-01-25 ENCOUNTER — APPOINTMENT (OUTPATIENT)
Dept: PHARMACY | Age: 87
End: 2022-01-25
Payer: MEDICARE

## 2022-02-01 ENCOUNTER — HOSPITAL ENCOUNTER (OUTPATIENT)
Dept: PHARMACY | Age: 87
Setting detail: THERAPIES SERIES
Discharge: HOME | End: 2022-02-01
Payer: MEDICARE

## 2022-02-01 DIAGNOSIS — Z79.01 ANTICOAGULATED ON COUMADIN: ICD-10-CM

## 2022-02-01 DIAGNOSIS — Z51.81 ENCOUNTER FOR THERAPEUTIC DRUG MONITORING: ICD-10-CM

## 2022-02-01 DIAGNOSIS — Z79.01 ANTICOAGULATED ON COUMADIN: Primary | ICD-10-CM

## 2022-02-01 DIAGNOSIS — I26.99 PULMONARY EMBOLISM, UNSPECIFIED CHRONICITY, UNSPECIFIED PULMONARY EMBOLISM TYPE, UNSPECIFIED WHETHER ACUTE COR PULMONALE PRESENT (HCC): ICD-10-CM

## 2022-02-01 DIAGNOSIS — I26.99 PULMONARY EMBOLISM, UNSPECIFIED CHRONICITY, UNSPECIFIED PULMONARY EMBOLISM TYPE, UNSPECIFIED WHETHER ACUTE COR PULMONALE PRESENT (HCC): Primary | ICD-10-CM

## 2022-02-01 LAB — POC INR: 2.8 (ref 0.8–1.2)

## 2022-02-01 PROCEDURE — 36416 COLLJ CAPILLARY BLOOD SPEC: CPT

## 2022-02-01 PROCEDURE — 85610 PROTHROMBIN TIME: CPT

## 2022-02-01 PROCEDURE — 99211 OFF/OP EST MAY X REQ PHY/QHP: CPT

## 2022-02-01 NOTE — PROGRESS NOTES
Medication Management [address]  [phone] (phone)  [phone] (fax)    Ms. Gail Pan is a 80 y.o.  female with history of PE, per referral from Ronal Brunner, APRN-CNP, who presents today for Warfarin monitoring and adjustment (1 week late for 2 week visit). Patient verifies current dosing regimen and tablet strength. Doesn't want prescription renewed, yet. No missed or extra doses. Patient denies bleeding/chest pain. Has usual SOB/easy bruising. Chest feels \"stuffed\" at times. Has usual slight swelling of legs - wears compression socks. No blood in urine or stool. Dietary changes: still has poor appetite since COVID in December, but slightly improved since last visit. Has cut out sugary foods. Changes in medication/OTC agents/herbals: did start taking Toradol last week (1/day on some days) for back/left hip pain. Has also used Tylenol. States has arthritis in spine. Sees doctor in 1 month. Knows Toradol is not good with Coumadin; reminded to take with food. Still has weakness of arms, especially right (and that shoulder), since COVID. No change in alcohol use or tobacco use. No change in activity level. Patient denies headaches/falls. Has usual slight dizziness since COVID. No vomiting/diarrhea or acute illness. Takes Zofran at times for ongoing nausea since COVID. No procedures scheduled in the future at this time. Assessment:   Lab Results   Component Value Date    INR 2.80 (H) 02/01/2022    INR 3.30 (H) 01/11/2022    INR 2.90 (H) 12/07/2021     INR therapeutic - goal 2-3. Recent Labs     02/01/22  1314   INR 2.80*       Plan:  POCT INR ordered/performed/result reviewed. Continue Coumadin 4 mg daily PO. Recheck INR in 4 week(s). Patient reminded to call the Anticoagulation Clinic with any signs or symptoms of bleeding or with any medication changes. Patient given instructions utilizing the teach back method.     After visit summary printed and reviewed with patient. Given routine H&H order for this month. Discharged ambulatory in no apparent distress, wearing mask.

## 2022-02-09 ENCOUNTER — HOSPITAL ENCOUNTER (OUTPATIENT)
Age: 87
Discharge: HOME | End: 2022-02-09
Payer: MEDICARE

## 2022-02-09 DIAGNOSIS — I26.99 PULMONARY EMBOLISM, UNSPECIFIED CHRONICITY, UNSPECIFIED PULMONARY EMBOLISM TYPE, UNSPECIFIED WHETHER ACUTE COR PULMONALE PRESENT (HCC): ICD-10-CM

## 2022-02-09 DIAGNOSIS — Z79.01 ANTICOAGULATED ON COUMADIN: ICD-10-CM

## 2022-02-09 LAB
HCT VFR BLD CALC: 44.9 % (ref 37–47)
HEMOGLOBIN: 14.3 GM/DL (ref 12–16)

## 2022-02-09 PROCEDURE — 85018 HEMOGLOBIN: CPT

## 2022-02-09 PROCEDURE — 36415 COLL VENOUS BLD VENIPUNCTURE: CPT

## 2022-02-09 PROCEDURE — 85014 HEMATOCRIT: CPT

## 2022-02-28 ENCOUNTER — HOSPITAL ENCOUNTER (OUTPATIENT)
Dept: PHARMACY | Age: 87
Setting detail: THERAPIES SERIES
Discharge: HOME | End: 2022-02-28
Payer: MEDICARE

## 2022-02-28 DIAGNOSIS — I26.99 PULMONARY EMBOLISM, UNSPECIFIED CHRONICITY, UNSPECIFIED PULMONARY EMBOLISM TYPE, UNSPECIFIED WHETHER ACUTE COR PULMONALE PRESENT (HCC): Primary | ICD-10-CM

## 2022-02-28 DIAGNOSIS — Z79.01 ANTICOAGULATED ON COUMADIN: ICD-10-CM

## 2022-02-28 DIAGNOSIS — Z51.81 ENCOUNTER FOR THERAPEUTIC DRUG MONITORING: ICD-10-CM

## 2022-02-28 DIAGNOSIS — Z95.828 PRESENCE OF IVC FILTER: ICD-10-CM

## 2022-02-28 LAB — POC INR: 2.6 (ref 0.8–1.2)

## 2022-02-28 PROCEDURE — 36416 COLLJ CAPILLARY BLOOD SPEC: CPT

## 2022-02-28 PROCEDURE — 99212 OFFICE O/P EST SF 10 MIN: CPT

## 2022-02-28 PROCEDURE — 85610 PROTHROMBIN TIME: CPT

## 2022-02-28 RX ORDER — WARFARIN SODIUM 2 MG/1
TABLET ORAL
Qty: 180 TABLET | Refills: 1 | Status: SHIPPED | OUTPATIENT
Start: 2022-02-28 | End: 2022-09-22 | Stop reason: SDUPTHER

## 2022-02-28 RX ORDER — WARFARIN SODIUM 2 MG/1
TABLET ORAL EVERY EVENING
COMMUNITY
End: 2022-04-05

## 2022-03-29 ENCOUNTER — TELEPHONE (OUTPATIENT)
Dept: FAMILY MEDICINE CLINIC | Age: 87
End: 2022-03-29

## 2022-03-29 ENCOUNTER — OFFICE VISIT (OUTPATIENT)
Dept: FAMILY MEDICINE CLINIC | Age: 87
End: 2022-03-29
Payer: MEDICARE

## 2022-03-29 VITALS
HEIGHT: 64 IN | HEART RATE: 60 BPM | RESPIRATION RATE: 14 BRPM | SYSTOLIC BLOOD PRESSURE: 132 MMHG | WEIGHT: 161.8 LBS | TEMPERATURE: 98.1 F | OXYGEN SATURATION: 99 % | DIASTOLIC BLOOD PRESSURE: 76 MMHG | BODY MASS INDEX: 27.62 KG/M2

## 2022-03-29 DIAGNOSIS — I10 ESSENTIAL HYPERTENSION: ICD-10-CM

## 2022-03-29 DIAGNOSIS — M25.559 HIP PAIN: ICD-10-CM

## 2022-03-29 DIAGNOSIS — M54.50 LUMBAR PAIN: ICD-10-CM

## 2022-03-29 DIAGNOSIS — Z00.00 MEDICARE ANNUAL WELLNESS VISIT, SUBSEQUENT: Primary | ICD-10-CM

## 2022-03-29 DIAGNOSIS — Z13.31 DEPRESSION SCREENING NEGATIVE: ICD-10-CM

## 2022-03-29 DIAGNOSIS — R35.1 FREQUENT URINATION AT NIGHT: ICD-10-CM

## 2022-03-29 DIAGNOSIS — E03.9 HYPOTHYROIDISM, UNSPECIFIED TYPE: ICD-10-CM

## 2022-03-29 DIAGNOSIS — M40.203 KYPHOSIS OF CERVICOTHORACIC REGION, UNSPECIFIED KYPHOSIS TYPE: ICD-10-CM

## 2022-03-29 PROCEDURE — G0439 PPPS, SUBSEQ VISIT: HCPCS | Performed by: NURSE PRACTITIONER

## 2022-03-29 PROCEDURE — 99214 OFFICE O/P EST MOD 30 MIN: CPT | Performed by: NURSE PRACTITIONER

## 2022-03-29 RX ORDER — HYDROCODONE BITARTRATE AND ACETAMINOPHEN 5; 325 MG/1; MG/1
1 TABLET ORAL DAILY
Qty: 18 TABLET | Refills: 0 | Status: SHIPPED | OUTPATIENT
Start: 2022-03-29 | End: 2022-03-29 | Stop reason: SDUPTHER

## 2022-03-29 RX ORDER — DILTIAZEM HYDROCHLORIDE 180 MG/1
360 CAPSULE, EXTENDED RELEASE ORAL DAILY
Qty: 180 CAPSULE | Refills: 4 | Status: SHIPPED | OUTPATIENT
Start: 2022-03-29

## 2022-03-29 RX ORDER — ATORVASTATIN CALCIUM 20 MG/1
20 TABLET, FILM COATED ORAL EVERY EVENING
Qty: 90 TABLET | Refills: 4 | Status: SHIPPED | OUTPATIENT
Start: 2022-03-29

## 2022-03-29 RX ORDER — HYDROCODONE BITARTRATE AND ACETAMINOPHEN 5; 325 MG/1; MG/1
1 TABLET ORAL DAILY
Qty: 30 TABLET | Refills: 0 | Status: SHIPPED | OUTPATIENT
Start: 2022-03-29 | End: 2022-04-28

## 2022-03-29 RX ORDER — LEVOTHYROXINE SODIUM 0.12 MG/1
125 TABLET ORAL DAILY
Qty: 90 TABLET | Refills: 4 | Status: SHIPPED | OUTPATIENT
Start: 2022-03-29

## 2022-03-29 ASSESSMENT — PATIENT HEALTH QUESTIONNAIRE - PHQ9
1. LITTLE INTEREST OR PLEASURE IN DOING THINGS: 0
SUM OF ALL RESPONSES TO PHQ QUESTIONS 1-9: 0
SUM OF ALL RESPONSES TO PHQ QUESTIONS 1-9: 0
SUM OF ALL RESPONSES TO PHQ9 QUESTIONS 1 & 2: 0
SUM OF ALL RESPONSES TO PHQ QUESTIONS 1-9: 0
SUM OF ALL RESPONSES TO PHQ QUESTIONS 1-9: 0
2. FEELING DOWN, DEPRESSED OR HOPELESS: 0

## 2022-03-29 ASSESSMENT — LIFESTYLE VARIABLES: HOW OFTEN DO YOU HAVE A DRINK CONTAINING ALCOHOL: NEVER

## 2022-03-29 NOTE — PROGRESS NOTES
Medicare Annual Wellness Visit    Emma Mesa is here for Medicare AWV and Medication Refill    Assessment & Plan      Encounter Diagnoses   Name Primary?  Medicare annual wellness visit, subsequent Yes    Frequent urination at night     Hip pain     Lumbar pain     Depression screening negative     Hypothyroidism, unspecified type     Essential hypertension     Kyphosis of cervicothoracic region, unspecified kyphosis type      Orders Placed This Encounter   Procedures    XR PELVIS (MIN 3 VIEWS)    XR LUMBAR SPINE (MIN 4 VIEWS)    Urinalysis with Reflex to Culture       Will order norco per pt request  Controlled substances monitoring: possible medication side effects, risk of tolerance and/or dependence, and alternative treatments discussed and no signs of potential drug abuse or diversion identified. Recommendations for Preventive Services Due: see orders and patient instructions/AVS.  Recommended screening schedule for the next 5-10 years is provided to the patient in written form: see Patient Instructions/AVS.        Chronic Pain    HPI:    Patient has chronic pain of the low back and bilateral hips. Pain control: inadequate  Improvement in function and ADLs? Patient is no table to walk or go to the grocery store and do cleaning around her house b/c she is in so much pain. Tried toradol and it did not work, has tried tylenol and motrin with minimal relief. Present for: approximately 6 months  Current Medications:    Escalation of dosage recently?  no    Evidence for abuse or diversion? no   Seen specialist or pain clinic?: no  Last UDS:  Hs not done, will trial 1 norco a day, we also discussed the risks of taking narcotics with her history of falls and being on coumadin, pt insistent on narcotics for pain control. States she can not do the things she needs to do in constant pain .    Pain contract: no    Controlled Substances Monitoring: Periodic Controlled Substance Monitoring: Possible medication side effects, risk of tolerance/dependence & alternative treatments discussed. Juan A Dumont, APRN - CNP)      Hypothyroidism    HPI:  Currently treated for Hypothyroidism? Yes  Fatigue? No  Recent change in weight? No  Cold/Heat intolerance? No  Diarrhea/Constipation? No  Diaphoresis? No  Anxiety? No  Palpitations? No   Hair Loss? No  Pt asking for urinalysis sample she has had repeated UTI's in the past and wants her urine checked. does get up at night a lot to urinate. Lab Results   Component Value Date    TSH 1.340 08/25/2021    T4FREE 1.52 12/20/2019     Lab Results   Component Value Date     08/25/2021    K 4.4 08/25/2021    K 3.7 09/08/2020     08/25/2021    CO2 27 08/25/2021    BUN 11 08/25/2021    CREATININE 0.6 08/25/2021    GLUCOSE 144 08/25/2021    GLUCOSE 121 05/29/2015    CALCIUM 9.3 08/25/2021      Lab Results   Component Value Date    CHOL 155 08/25/2021    CHOL 157 03/20/2021    CHOL 179 12/20/2019     Lab Results   Component Value Date    TRIG 125 08/25/2021    TRIG 98 03/20/2021    TRIG 128 12/20/2019     Lab Results   Component Value Date    HDL 56 08/25/2021    HDL 67 03/20/2021    HDL 69 12/20/2019     Lab Results   Component Value Date    LDLCALC 74 08/25/2021    LDLCALC 70 03/20/2021    LDLCALC 84 12/20/2019     No results found for: LABVLDL, VLDL  No results found for: CHOLHDLRATIO   Lab Results   Component Value Date    WBC 9.9 08/25/2021    HGB 14.3 02/09/2022    HCT 44.9 02/09/2022    MCV 91.5 08/25/2021     08/25/2021       No follow-ups on file. Subjective   In constant pain, needs some pain relief. Has had a few falls, declines nay therapy for this, denies balance concerns. Patient's complete Health Risk Assessment and screening values have been reviewed and are found in Flowsheets. The following problems were reviewed today and where indicated follow up appointments were made and/or referrals ordered.     Positive Risk Factor Screenings with Interventions:    Fall Risk:  Do you feel unsteady or are you worried about falling? : (!) yes  2 or more falls in past year?: (!) yes  Fall with injury in past year?: (!) yes     Fall Risk Interventions:    · Home safety tips provided  · Patient declines any further evaluation/treatment for this issue  · Imaging study ordered- x-rays of back and hips             General Health and ACP:  General  In general, how would you say your health is?: Fair  In the past 7 days, have you experienced any of the following: New or Increased Pain, New or Increased Fatigue, Loneliness, Social Isolation, Stress or Anger?: No  Do you get the social and emotional support that you need?: Yes  Do you have a Living Will?: Yes    Advance Directives     Power of  Living Will ACP-Advance Directive ACP-Power of     Not on File Not on File Not on File Not on File      General Health Risk Interventions:  · doing well no concerns.       Hearing/Vision:  Do you or your family notice any trouble with your hearing that hasn't been managed with hearing aids?: (!) Yes  Do you have difficulty driving, watching TV, or doing any of your daily activities because of your eyesight?: (!) Yes  Have you had an eye exam within the past year?: Yes  No exam data present    Hearing/Vision Interventions:  · Hearing concerns:  patient declines any further evaluation/treatment for hearing issues  · Vision concerns:  patient encouraged to make appointment with his/her eye specialist    Safety:  Do you have working smoke detectors?: Yes  Do you have any tripping hazards - loose or unsecured carpets or rugs?: (!) Yes  Do you have any tripping hazards - clutter in doorways, halls, or stairs?: (!) Yes  Do you have either shower bars, grab bars, non-slip mats or non-slip surfaces in your shower or bathtub?: Yes  Do all of your stairways have a railing or banister?: Yes  Do you always fasten your seatbelt when you are in a car?: Yes    Safety Interventions:  · Home safety tips provided  · Patient declines any further evaluation/treatment for this issue           Objective   Vitals:    03/29/22 1451   BP: 132/76   Pulse: 60   Resp: 14   Temp: 98.1 °F (36.7 °C)   TempSrc: Oral   SpO2: 99%   Weight: 161 lb 12.8 oz (73.4 kg)   Height: 5' 4\" (1.626 m)      Body mass index is 27.77 kg/m². General Appearance: alert and oriented to person, place and time, well-developed and well-nourished, in no acute distress  Skin: warm and dry, no rash or erythema  Head: normocephalic and atraumatic  Pulmonary/Chest: clear to auscultation bilaterally- no wheezes, rales or rhonchi, normal air movement, no respiratory distress  Cardiovascular: normal rate, normal S1 and S2, no gallops, intact distal pulses and no carotid bruits  Abdomen: soft, non-tender, non-distended, normal bowel sounds, no masses or organomegaly  Musculoskeletal: no swollen joints, osteoarthritic changes noted in both hands, diffuse trigger point tenderness present, paraspinal muscle tenderness present-  bilateral lumbar and kyphosis notd       Allergies   Allergen Reactions    Bactrim [Sulfamethoxazole-Trimethoprim] Nausea Only    Tramadol Other (See Comments)     Patient felt \"not right\"    Codeine Nausea And Vomiting     Prior to Visit Medications    Medication Sig Taking? Authorizing Provider   warfarin (JANTOVEN) 2 MG tablet use as directed by [facility] coumadin clinic 180 tabs = 90 days Yes Liz Kong MD   warfarin (COUMADIN) 2 MG tablet Take by mouth every evening Dosed by [facility]. [facility]. Yes Historical Provider, MD   atorvastatin (LIPITOR) 20 MG tablet Take 1 tablet by mouth every evening Indications: Blood Cholesterol Abnormal Yes LISA Lorenzo - CNP   levothyroxine (SYNTHROID) 125 MCG tablet Take 1 tablet by mouth Daily Indications: Impaired Thyroid Function Pt wants only synthroid brand name  Takes MTWT only. 5-14-18.  Yes LISA Lorenzo - CNP   dilTIAZem (DILACOR XR) 180 MG extended release capsule Take 2 capsules by mouth daily Indications: High Blood Pressure Disorder Yes LISA Hall CNP   Polyethyl Glycol-Propyl Glycol (SYSTANE ULTRA OP) Apply 1 drop to eye 2 times daily Yes Historical Provider, MD BARAJAS 5000 PLUS 1.1 % CREA Place onto teeth 2 times daily  Yes Historical Provider, MD   ondansetron (ZOFRAN ODT) 4 MG disintegrating tablet Take 1 tablet by mouth every 8 hours as needed for Nausea or Vomiting  Patient not taking: Reported on 2/28/2022  LISA Jones CNP   ketorolac (TORADOL) 10 MG tablet Take 1 tablet by mouth every 6 hours as needed for Pain  Patient not taking: Reported on 2/28/2022  LISA Hall CNP   acetaminophen (TYLENOL) 500 MG tablet Take 500 mg by mouth every 6 hours as needed for Pain or Fever Don't take more than 3,000 mg each day.   Patient not taking: Reported on 3/29/2022  Historical Provider, MD Ramos (Including outside providers/suppliers regularly involved in providing care):   Patient Care Team:  LISA Hall CNP as PCP - General (Nurse Practitioner)  LISA Hall CNP as PCP - [facility] Emil Butlerled Provider    Reviewed and updated this visit:  Tobacco  Allergies  Meds  Problems  Med Hx  Surg Hx  Soc Hx  Fam Hx

## 2022-03-29 NOTE — TELEPHONE ENCOUNTER
[facility] is requesting a updated script for norco  Dispense Quantity: 18 tablet Refills: 0    Sig: Take 1 tablet by mouth daily for 30 days. Intended supply: 3 days.  Take lowest dose possible to manage pain

## 2022-04-04 ENCOUNTER — APPOINTMENT (OUTPATIENT)
Dept: PHARMACY | Age: 87
End: 2022-04-04
Payer: MEDICARE

## 2022-04-05 ENCOUNTER — HOSPITAL ENCOUNTER (OUTPATIENT)
Age: 87
Discharge: HOME | End: 2022-04-05
Payer: MEDICARE

## 2022-04-05 ENCOUNTER — HOSPITAL ENCOUNTER (OUTPATIENT)
Dept: PHARMACY | Age: 87
Setting detail: THERAPIES SERIES
Discharge: HOME | End: 2022-04-05
Payer: MEDICARE

## 2022-04-05 ENCOUNTER — HOSPITAL ENCOUNTER (OUTPATIENT)
Dept: GENERAL RADIOLOGY | Age: 87
Discharge: HOME | End: 2022-04-05
Payer: MEDICARE

## 2022-04-05 DIAGNOSIS — M54.50 LUMBAR PAIN: ICD-10-CM

## 2022-04-05 DIAGNOSIS — Z79.01 ANTICOAGULATED ON COUMADIN: ICD-10-CM

## 2022-04-05 DIAGNOSIS — M25.559 HIP PAIN: ICD-10-CM

## 2022-04-05 DIAGNOSIS — Z51.81 ENCOUNTER FOR THERAPEUTIC DRUG MONITORING: ICD-10-CM

## 2022-04-05 DIAGNOSIS — I26.99 PULMONARY EMBOLISM, UNSPECIFIED CHRONICITY, UNSPECIFIED PULMONARY EMBOLISM TYPE, UNSPECIFIED WHETHER ACUTE COR PULMONALE PRESENT (HCC): Primary | ICD-10-CM

## 2022-04-05 LAB — POC INR: 3.5 (ref 0.8–1.2)

## 2022-04-05 PROCEDURE — 72110 X-RAY EXAM L-2 SPINE 4/>VWS: CPT

## 2022-04-05 PROCEDURE — 36416 COLLJ CAPILLARY BLOOD SPEC: CPT

## 2022-04-05 PROCEDURE — 85610 PROTHROMBIN TIME: CPT

## 2022-04-05 PROCEDURE — 99212 OFFICE O/P EST SF 10 MIN: CPT

## 2022-04-05 PROCEDURE — 72170 X-RAY EXAM OF PELVIS: CPT

## 2022-04-06 ENCOUNTER — TELEPHONE (OUTPATIENT)
Dept: FAMILY MEDICINE CLINIC | Age: 87
End: 2022-04-06

## 2022-04-06 ENCOUNTER — TELEPHONE (OUTPATIENT)
Dept: FAMILY MEDICINE CLINIC | Age: 87
End: 2022-04-06
Payer: MEDICARE

## 2022-04-06 DIAGNOSIS — R35.0 URINARY FREQUENCY: Primary | ICD-10-CM

## 2022-04-06 NOTE — TELEPHONE ENCOUNTER
She had an appt on 3/29/2022 in which we gave her an order to take with her to have the urine performed when she had her x-rays done. Did she do the urine at the hospital? If not I can place another order for her to do so or she can also come here.

## 2022-04-06 NOTE — TELEPHONE ENCOUNTER
----- Message from LISA Patel CNP sent at 4/6/2022  8:12 AM EDT -----  There is another encounter for her too, regarding her lumbar spine x-rays, Let pt know her x-rays also identify osteoporosis. She should be taking calcium with vitamin d daily, also she is eligible for an osteoporosis medication fosamax, which helps to preserve her current bone density and strength. It it taken once a week, is she agreeable to starting this?

## 2022-04-06 NOTE — TELEPHONE ENCOUNTER
Spoke with pt earlier and she stated that she thought a lab order to check her urine was going to be ordered to check for a uti.

## 2022-04-06 NOTE — TELEPHONE ENCOUNTER
----- Message from LISA Gabriel CNP sent at 4/6/2022  8:10 AM EDT -----  Let pt know her lumbar spine x-ray identified multilevel degenerative disc disease with arthritis. Which is moderate to severe. She also has some bone spurs, which are adding to her pain. Ask if the nroco helped her, how is her pain level now?  Thanks

## 2022-04-06 NOTE — TELEPHONE ENCOUNTER
Called pt and advised of results. She would like to research the fosamax and let us know. She states she is only doing a half of the norco and she said she is doing ok so far. She didn't have any questions currently. She voiced understanding.

## 2022-04-07 NOTE — TELEPHONE ENCOUNTER
I am so sorry the patient is frustrated but please let her know it sounds like an error on the end of the lab and not our office. I guess I don't understand why it wasn't collected yesterday or showing as collected when it was not? as we can see I placed the order, did she go to a [facility] lab? I can place another orrder but it is possible the same thing may happen again. Does she want to come here to have it done so there is not any confusion?

## 2022-04-07 NOTE — TELEPHONE ENCOUNTER
Pt informed and verbalized understanding.     appt scheduled for tomorrow,pt has company today  Future Appointments   Date Time Provider Samuel Valladares   4/8/2022 10:00 AM SCHEDULE, NURSE Emile [address]   4/19/2022  2:20 PM JEN Mcgarry [facility] [address]   6/29/2022  3:00 PM Kayleen Wells APRN - [id number] [address]   3/30/2023  3:20 PM LISA Palacios - [address]

## 2022-04-07 NOTE — TELEPHONE ENCOUNTER
Kumar   Patient calling a bit frustrated about the urine order. She went to the lab again yesterday and was told that the order was not there.   When you look in EPIC at the order it is showing Christiano Espinoza" but it is also showing that it was collected 04.06.2022  The order hat was created on 03.29.2022 was canceled buy the lab and there is a message on that order that says \"sterile container\"    Please create another urine order for the patient so that she may get this done today  Please route this conversation back to me so that I may address it with the patient

## 2022-04-11 ENCOUNTER — TELEPHONE (OUTPATIENT)
Dept: FAMILY MEDICINE CLINIC | Age: 87
End: 2022-04-11

## 2022-04-11 NOTE — TELEPHONE ENCOUNTER
Let Emma know it is possible the vaginal itching is due to menopause, is it inside vaginal itching or on the labia? has she tried any vaginal creams for it yet? If not I can recommend some that may help.

## 2022-04-11 NOTE — TELEPHONE ENCOUNTER
Called pt and she states she isn't having any new symptoms. She feels the blood could have just been from vaginal itching. Stating this is an ongoing issue that she has always just treated herself. She was wondering if there is something she can try for this. She isn't noticing any blood in her urine.

## 2022-04-11 NOTE — TELEPHONE ENCOUNTER
----- Message from LISA Iglesias CNP sent at 4/11/2022  8:44 AM EDT -----  Let pt know her urine identifies a small amount of blood but no bacteria, is she having any UTI symptoms? Can she see blood in her urine? Is it dark. She also had a small amount of blood in her urine 4 years ago. This does not look like it is new, but if symptomatic with UTI symptoms I would recommend an atb.

## 2022-04-12 NOTE — TELEPHONE ENCOUNTER
Noted, I would have her continue with the cortisone cream if it helps, if not I can try something prescription strength for her. If symptoms worsen have her let us know.

## 2022-04-12 NOTE — TELEPHONE ENCOUNTER
Pt informed and verbalized understanding.     Pt states she had a total hysterectomy and thought that might have something to do with the itching  the itching is outside not inside of the vagina  She has tried cortisone cream which does help some, she washes with vagisil

## 2022-04-19 ENCOUNTER — APPOINTMENT (OUTPATIENT)
Dept: PHARMACY | Age: 87
End: 2022-04-19
Payer: MEDICARE

## 2022-04-20 ENCOUNTER — APPOINTMENT (OUTPATIENT)
Dept: PHARMACY | Age: 87
End: 2022-04-20
Payer: MEDICARE

## 2022-04-27 ENCOUNTER — HOSPITAL ENCOUNTER (OUTPATIENT)
Dept: PHARMACY | Age: 87
Setting detail: THERAPIES SERIES
Discharge: HOME | End: 2022-04-27
Payer: MEDICARE

## 2022-04-27 DIAGNOSIS — I26.99 PULMONARY EMBOLISM, UNSPECIFIED CHRONICITY, UNSPECIFIED PULMONARY EMBOLISM TYPE, UNSPECIFIED WHETHER ACUTE COR PULMONALE PRESENT (HCC): Primary | ICD-10-CM

## 2022-04-27 DIAGNOSIS — Z79.01 ANTICOAGULATED ON COUMADIN: ICD-10-CM

## 2022-04-27 DIAGNOSIS — Z51.81 ENCOUNTER FOR THERAPEUTIC DRUG MONITORING: ICD-10-CM

## 2022-04-27 LAB — POC INR: 3.2 (ref 0.8–1.2)

## 2022-04-27 PROCEDURE — 99211 OFF/OP EST MAY X REQ PHY/QHP: CPT

## 2022-04-27 PROCEDURE — 85610 PROTHROMBIN TIME: CPT

## 2022-04-27 PROCEDURE — 36416 COLLJ CAPILLARY BLOOD SPEC: CPT

## 2022-04-27 NOTE — PROGRESS NOTES
Medication Management [address]  [phone] (phone)  [phone] (fax)    Ms. Dominique Gregorio is a 80 y.o.  female with history of PE, per referral from BENNIE Swartz, who presents today for Warfarin monitoring and adjustment (1 week late for 2 week visit). Patient verifies current dosing regimen and tablet strength. No missed or extra doses. Patient denies bleeding/chest pain. Has usual easy bruising. Has had more SOB past week, but doing more. C/o fatigue. Continues to have minor swelling of ankles since COVID last December. Wears compression hose. No blood in stool. States had blood on urine test, but none visible (had vaginal itching at the time). Urine had been dark, but increased water intake and it cleared. Dietary changes: has cut out sugar, bread, decaf. coffee. Guesses she's lost 6# since last visit - definitely lost belly fat. No changes in medication/OTC agents/herbals. Never purchased the Voltaren gel - removed from list. Continues to use Tylenol or Norco for chronic back pain. No change in alcohol use or tobacco use. Change in activity level: increased back to usual level. Patient denies dizziness/lightheadedness/falls. Took nothing for a minor headache. No diarrhea or acute illness. Had vomiting/dry heaves most of night shortly after last visit. Did not see pills in emesis. No procedures scheduled in the future at this time. [place]. Assessment:   Lab Results   Component Value Date    INR 3.20 (H) 04/27/2022    INR 3.50 (H) 04/05/2022    INR 2.60 (H) 02/28/2022     INR supratherapeutic - goal 2-3. Recent Labs     04/27/22  1346   INR 3.20*       Plan:  POCT INR ordered/performed/result reviewed. Decrease PO Coumadin to 2 mg W, 4 mg MTThFSS (from 4 mg daily=7.1% decrease); today is W.  Recheck INR in 2 week(s).   (Report given - orders entered by Pilar Pastrana, PharmD.)   Patient reminded to call the Anticoagulation Clinic with any signs or symptoms of bleeding or with any medication changes. Patient given instructions utilizing the teach back method. After visit summary printed and reviewed with patient. Advised extra caution. Discharged ambulatory in no apparent distress, wearing mask.     For Pharmacy Admin Tracking Only     Intervention Detail: Dose Adjustment: 1, reason: Therapy De-escalation   Total # of Interventions Recommended: 1   Total # of Interventions Accepted: 1   Time Spent (min): 0.5

## 2022-05-10 ENCOUNTER — TELEPHONE (OUTPATIENT)
Dept: FAMILY MEDICINE CLINIC | Age: 87
End: 2022-05-10

## 2022-05-10 DIAGNOSIS — I27.82 CHRONIC PULMONARY THROMBOEMBOLISM SYNDROME (HCC): Primary | ICD-10-CM

## 2022-05-10 DIAGNOSIS — Z79.01 LONG TERM (CURRENT) USE OF ANTICOAGULANTS: ICD-10-CM

## 2022-05-10 NOTE — TELEPHONE ENCOUNTER
----- Message from Selin Lopez RN sent at 5/10/2022  1:50 PM EDT -----  Please renew referral for Anticoagulation Monitoring. Thanks, L.  Brynn Daniels RN BSN

## 2022-05-11 ENCOUNTER — HOSPITAL ENCOUNTER (OUTPATIENT)
Dept: PHARMACY | Age: 87
Setting detail: THERAPIES SERIES
Discharge: HOME | End: 2022-05-11
Payer: MEDICARE

## 2022-05-11 DIAGNOSIS — Z79.01 ANTICOAGULATED ON COUMADIN: ICD-10-CM

## 2022-05-11 DIAGNOSIS — Z51.81 ENCOUNTER FOR THERAPEUTIC DRUG MONITORING: ICD-10-CM

## 2022-05-11 DIAGNOSIS — I26.99 PULMONARY EMBOLISM, OTHER, UNSPECIFIED CHRONICITY, UNSPECIFIED WHETHER ACUTE COR PULMONALE PRESENT (HCC): Primary | ICD-10-CM

## 2022-05-11 LAB — POC INR: 4.1 (ref 0.8–1.2)

## 2022-05-11 PROCEDURE — 85610 PROTHROMBIN TIME: CPT

## 2022-05-11 PROCEDURE — 36416 COLLJ CAPILLARY BLOOD SPEC: CPT

## 2022-05-11 PROCEDURE — 99212 OFFICE O/P EST SF 10 MIN: CPT

## 2022-05-11 NOTE — PROGRESS NOTES
Medication Management [address]  [phone] (phone)  [phone] (fax)    Ms. Radha Mullen is a 80 y.o.  female with history of PE who presents today for anticoagulation monitoring and adjustment. Patient verifies current dosing regimen and tablet strength. No missed or extra doses. Patient denies s/s bleeding/bruising/swelling/SOB/chest pain  No blood in urine or stool.  - Did have blood in urine on recent urinalysis. Will be following up with kidney doctor soon. No dietary changes. - Cut out coffee and cutting down on sweets and white bread. - Drinking more water. - Has lost approximately 6 pounds. No changes in medication/OTC agents/Herbals. No change in alcohol use or tobacco use. No change in activity level. - Less active. States that she doesn't feel like doing anything.  - Energy level is way down. Patient denies headaches/dizziness/lightheadedness/falls. - Been having headache for the past week. Patient has bone spurs on the back of head and neck. Thinks this may be contributing to her headache. No vomiting/diarrhea or acute illness. - Feels \"sick to her stomach\" sometimes. Feels constantly nauseous. No Procedures scheduled in the future at this time. Assessment:   Lab Results   Component Value Date    INR 4.10 (H) 05/11/2022    INR 3.20 (H) 04/27/2022    INR 3.50 (H) 04/05/2022     INR supratherapeutic   Recent Labs     05/11/22  1329   INR 4.10*     Plan:  Hold Coumadin today. Then decrease Coumadin to 2 mg on Monday and Thursday, 4 mg all other days. Recheck INR in 6 day(s). Patient reminded to call the Anticoagulation Clinic with any signs or symptoms of bleeding or with any medication changes. Patient given instructions utilizing the teach back method. After visit summary printed and reviewed with patient. Discharged ambulatory in no apparent distress.     For [address] [place] [address] Intervention Detail: Adherence Monitorin and Dose Adjustment: 1, reason: Therapy Optimization   Total # of Interventions Recommended: 2   Total # of Interventions Accepted: 2   Time Spent (min): [address], PharmD, BCPS  2022  2:48 PM

## 2022-05-17 ENCOUNTER — HOSPITAL ENCOUNTER (OUTPATIENT)
Dept: PHARMACY | Age: 87
Setting detail: THERAPIES SERIES
Discharge: HOME | End: 2022-05-17
Payer: MEDICARE

## 2022-05-17 DIAGNOSIS — I26.99 PULMONARY EMBOLISM, OTHER, UNSPECIFIED CHRONICITY, UNSPECIFIED WHETHER ACUTE COR PULMONALE PRESENT (HCC): Primary | ICD-10-CM

## 2022-05-17 DIAGNOSIS — Z79.01 ANTICOAGULATED ON COUMADIN: ICD-10-CM

## 2022-05-17 DIAGNOSIS — Z51.81 ENCOUNTER FOR THERAPEUTIC DRUG MONITORING: ICD-10-CM

## 2022-05-17 LAB — POC INR: 2.6 (ref 0.8–1.2)

## 2022-05-17 PROCEDURE — 85610 PROTHROMBIN TIME: CPT

## 2022-05-17 PROCEDURE — 99211 OFF/OP EST MAY X REQ PHY/QHP: CPT

## 2022-05-17 PROCEDURE — 36416 COLLJ CAPILLARY BLOOD SPEC: CPT

## 2022-05-17 NOTE — PROGRESS NOTES
Medication Management [address]  [phone] (phone)  [phone] (fax)    Ms. Anaya Alvarez is a 80 y.o.  female with history of PE who presents today for anticoagulation monitoring and adjustment. Patient verifies current dosing regimen and tablet strength. No missed or extra doses. Patient denies s/s bleeding/bruising/swelling/SOB/chest pain  No blood in urine or stool. No dietary changes. - Turnip greens once this past week  - Also ate something else with vitamin K in it but can't think of the name of it at this time. No changes in medication/OTC agents/Herbals. No change in alcohol use or tobacco use. No change in activity level. - Activity level still down. Don't feel like going anywhere or doing anything. Patient denies headaches/dizziness/lightheadedness/falls. - Headaches often   No vomiting/diarrhea or acute illness. No Procedures scheduled in the future at this time. Assessment:   Lab Results   Component Value Date    INR 2.60 (H) 05/17/2022    INR 4.10 (H) 05/11/2022    INR 3.20 (H) 04/27/2022     INR therapeutic   Recent Labs     05/17/22  1411   INR 2.60*     Plan:  Decrease Coumadin 2 mg MF, 4 mg all other days (~ 7.7% decrease). Recheck INR in 2 week(s). Patient reminded to call the Anticoagulation Clinic with signs or symptoms of bleeding or with any medication changes. Patient given instructions utilizing the teach back method. After visit summary printed and reviewed with patient. Discharged ambulatory in no apparent distress.     For Pharmacy Admin Tracking Only     Intervention Detail: Dose Adjustment: 1, reason: Therapy Optimization   Total # of Interventions Recommended: 1   Total # of Interventions Accepted: 1   Time Spent (min): [address], PharmD, BCPS  5/17/2022  3:36 PM

## 2022-05-31 ENCOUNTER — HOSPITAL ENCOUNTER (OUTPATIENT)
Dept: PHARMACY | Age: 87
Setting detail: THERAPIES SERIES
Discharge: HOME | End: 2022-05-31
Payer: MEDICARE

## 2022-05-31 DIAGNOSIS — Z79.01 ANTICOAGULATED ON COUMADIN: Primary | ICD-10-CM

## 2022-05-31 DIAGNOSIS — Z51.81 ENCOUNTER FOR THERAPEUTIC DRUG MONITORING: ICD-10-CM

## 2022-05-31 DIAGNOSIS — I26.99 PULMONARY EMBOLISM, OTHER, UNSPECIFIED CHRONICITY, UNSPECIFIED WHETHER ACUTE COR PULMONALE PRESENT (HCC): ICD-10-CM

## 2022-05-31 LAB — POC INR: 2.5 (ref 0.8–1.2)

## 2022-05-31 PROCEDURE — 85610 PROTHROMBIN TIME: CPT

## 2022-05-31 PROCEDURE — 36416 COLLJ CAPILLARY BLOOD SPEC: CPT

## 2022-05-31 PROCEDURE — 99211 OFF/OP EST MAY X REQ PHY/QHP: CPT

## 2022-05-31 NOTE — PROGRESS NOTES
Medication Management [address]   [phone] (phone)  [phone] (fax)    Ms. Anaya Alvarez is a 80 y.o.  female with history of PE who presents today for anticoagulation monitoring and adjustment. Patient verifies current dosing regimen and tablet strength. Reports following the dosing calendar. No missed or extra doses. Patient denies s/s bleeding/bruising/swelling/SOB/chest pain. Some leg/ankle swelling since having COVID in December, not unusual and MD is aware. No blood in urine or stool. Blood seen in urine sample on 4/8/22, will be seeing urologist soon. Otherwise, no blood seen in urine or stool. No dietary changes. No changes in medication/OTC agents/Herbals. No change in alcohol use or tobacco use. No change in activity level. Been going out more for graduation parties recently. Patient denies headaches/dizziness/lightheadedness/falls. Some dizzy spells, but not unusual.  No vomiting/diarrhea or acute illness. No Procedures scheduled in the future at this time. Assessment:   Lab Results   Component Value Date    INR 2.50 (H) 05/31/2022    INR 2.60 (H) 05/17/2022    INR 4.10 (H) 05/11/2022     INR therapeutic   Recent Labs     05/31/22  1425   INR 2.50*         Plan:  Continue Coumadin 2mg MF and 4mg [id number]. Recheck INR in 3 week(s). Patient reminded to call the Anticoagulation Clinic with any signs or symptoms of bleeding or with any medication changes. Patient given instructions utilizing the teach back method. After visit summary printed and reviewed with patient. Discharged ambulatory in no apparent distress.     For Pharmacy Admin Tracking Only     Time Spent (min): 9383 QVPN, PharmD  5/31/2022 3:17 PM

## 2022-06-03 ENCOUNTER — TELEPHONE (OUTPATIENT)
Dept: UROLOGY | Age: 87
End: 2022-06-03

## 2022-06-03 ENCOUNTER — TELEPHONE (OUTPATIENT)
Dept: FAMILY MEDICINE CLINIC | Age: 87
End: 2022-06-03

## 2022-06-03 DIAGNOSIS — R31.9 HEMATURIA, UNSPECIFIED TYPE: Primary | ICD-10-CM

## 2022-06-03 NOTE — TELEPHONE ENCOUNTER
Patient called in to get an appointment with [facility]. She is having her pcp send a referral over to us so she can get an appt.

## 2022-06-03 NOTE — TELEPHONE ENCOUNTER
Patient calling stating that she has been instructed to see a urologist. Patient made contact with urology and is able to see LISA Magana CNP but needs a referral.   Patient is requesting a referral to urology

## 2022-06-21 ENCOUNTER — HOSPITAL ENCOUNTER (OUTPATIENT)
Dept: PHARMACY | Age: 87
Setting detail: THERAPIES SERIES
Discharge: HOME | End: 2022-06-21
Payer: MEDICARE

## 2022-06-21 DIAGNOSIS — Z51.81 ENCOUNTER FOR THERAPEUTIC DRUG MONITORING: ICD-10-CM

## 2022-06-21 DIAGNOSIS — Z79.01 ANTICOAGULATED ON COUMADIN: ICD-10-CM

## 2022-06-21 DIAGNOSIS — I26.99 PULMONARY EMBOLISM, UNSPECIFIED CHRONICITY, UNSPECIFIED PULMONARY EMBOLISM TYPE, UNSPECIFIED WHETHER ACUTE COR PULMONALE PRESENT (HCC): Primary | ICD-10-CM

## 2022-06-21 LAB — POC INR: 2.3 (ref 0.8–1.2)

## 2022-06-21 PROCEDURE — 36416 COLLJ CAPILLARY BLOOD SPEC: CPT

## 2022-06-21 PROCEDURE — 85610 PROTHROMBIN TIME: CPT

## 2022-06-21 PROCEDURE — 99211 OFF/OP EST MAY X REQ PHY/QHP: CPT

## 2022-06-21 NOTE — PROGRESS NOTES
Medication Management [address]  [phone] (phone)  [phone] (fax)    Ms. Jerrell Orlando is a 80 y.o.  female with history of PE who presents today for anticoagulation monitoring and adjustment. Patient verifies current dosing regimen and tablet strength. No missed or extra doses. Patient denies s/s bleeding/bruising/swelling/chest pain. Patient reports she has a little SOB today due to hot weather - Patient states she will talk to her doctor if this continues. No blood in urine or stool. No dietary changes. No changes in medication/OTC agents/Herbals. No change in alcohol use or tobacco use. No change in activity level. Patient denies headaches/dizziness/lightheadedness/falls. No vomiting/diarrhea or acute illness. No Procedures scheduled in the future at this time. Assessment:   Lab Results   Component Value Date    INR 2.30 (H) 06/21/2022    INR 2.50 (H) 05/31/2022    INR 2.60 (H) 05/17/2022     INR therapeutic   Recent Labs     06/21/22  1421   INR 2.30*         Plan:  Continue Coumadin 2 mg MF and 4 mg all other days. Recheck INR in 4 week(s) on 7/19. Patient reminded to call the Anticoagulation Clinic with any signs or symptoms of bleeding or with any medication changes. Patient given instructions utilizing the teach back method. After visit summary printed and reviewed with patient. Discharged ambulatory in no apparent distress.     For Pharmacy Admin Tracking Only     Total # of Interventions Recommended: 0   Time Spent (min): 5620 Krystina Pinto PharmD   6/21/2022, 2:27 PM

## 2022-06-29 ENCOUNTER — OFFICE VISIT (OUTPATIENT)
Dept: FAMILY MEDICINE CLINIC | Age: 87
End: 2022-06-29
Payer: MEDICARE

## 2022-06-29 VITALS
RESPIRATION RATE: 12 BRPM | DIASTOLIC BLOOD PRESSURE: 60 MMHG | HEART RATE: 69 BPM | TEMPERATURE: 98.1 F | WEIGHT: 158 LBS | SYSTOLIC BLOOD PRESSURE: 124 MMHG | OXYGEN SATURATION: 95 % | HEIGHT: 64 IN | BODY MASS INDEX: 26.98 KG/M2

## 2022-06-29 DIAGNOSIS — R06.02 SOB (SHORTNESS OF BREATH) ON EXERTION: ICD-10-CM

## 2022-06-29 DIAGNOSIS — Z79.01 ANTICOAGULATED ON COUMADIN: ICD-10-CM

## 2022-06-29 DIAGNOSIS — R60.0 PEDAL EDEMA: Primary | ICD-10-CM

## 2022-06-29 DIAGNOSIS — Z91.81 AT HIGH RISK FOR FALLS: ICD-10-CM

## 2022-06-29 DIAGNOSIS — N89.8 VAGINAL ITCHING: ICD-10-CM

## 2022-06-29 DIAGNOSIS — Z95.828 PRESENCE OF IVC FILTER: ICD-10-CM

## 2022-06-29 PROCEDURE — 99214 OFFICE O/P EST MOD 30 MIN: CPT | Performed by: NURSE PRACTITIONER

## 2022-06-29 PROCEDURE — 1123F ACP DISCUSS/DSCN MKR DOCD: CPT | Performed by: NURSE PRACTITIONER

## 2022-06-29 RX ORDER — HYDROCORTISONE CREAM 1% 10 MG/G
1 CREAM TOPICAL ONCE
Qty: 28 G | Refills: 1 | Status: SHIPPED | OUTPATIENT
Start: 2022-06-29 | End: 2022-06-29

## 2022-06-29 ASSESSMENT — ENCOUNTER SYMPTOMS
CHEST TIGHTNESS: 0
COUGH: 0
BACK PAIN: 1
SHORTNESS OF BREATH: 1
WHEEZING: 0
CHOKING: 0

## 2022-06-29 NOTE — PROGRESS NOTES
[facility]  [address] [place]hp[place] [address]  Dept: [phone]  Dept Fax: [phone]  Loc: [phone]    Blair Sarah is a 80 y.o. femalewho presents today for her medical conditions/complaints as noted below. Emma Barriga c/o of Follow-up (swelling  continues in legs , sob w activity continues ) and Other (possible up coming  tooth extraction , discuss  blood thinners )      HPI:      Pt presents with a few concerns today. LE Edema    HPI:    Emma Mesa is complaining of swelling in both legs. Symptoms have been present for 4 month(s). Inciting event? No   Weight change associated with swelling? No    Erythema? No  Tenderness? Yes  Shortness of breath or chest pain? No  Orthopnea? No     Hx of renal, thyroid or liver disease? No  Hx of CHF? No  Hx of thromboembolic diease? Yes - she has had blood clots in the past and has an IVC filter in place and is on coumadin   Medications associated with fluid retention? No    Pt has never been diagnosed with CHF in past she does wear HEATH hose and her legs ache an hurt, swelling used to be gone in the  AM but now it continues even when she wakes up. She does have some SOB on exertion. Will evaluate for CHF,      Wants to know if she needs to stop coumadin for dental procedure. Advised she contact CC regarding this information.      Lab Results       Component                Value               Date/Time                  NA                       140                 08/25/2021 10:26 AM        K                        4.4                 08/25/2021 10:26 AM        K                        3.7                 09/08/2020 06:07 PM        CL                       101                 08/25/2021 10:26 AM        CO2                      27                  08/25/2021 10:26 AM        BUN                      11                  08/25/2021 10:26 AM        CREATININE 0.6                 08/25/2021 10:26 AM        GLUCOSE                  144                 08/25/2021 10:26 AM        GLUCOSE                  121                 05/29/2015 08:42 AM        CALCIUM                  9.3                 08/25/2021 10:26 AM    Lab Results       Component                Value               Date                       WBC                      9.9                 08/25/2021                 HGB                      14.3                02/09/2022                 HCT                      44.9                02/09/2022                 MCV                      91.5                08/25/2021                 PLT                      204                 08/25/2021            Last echo 2015 and was normal.     Pt with chronic vaginal itching denies any D/C or redness of labia or rashes of labia          Current Outpatient Medications   Medication Sig Dispense Refill    hydrocortisone acetate (VAGISIL) 1 % CREA Apply 1 Tube topically once for 1 dose 28 g 1    atorvastatin (LIPITOR) 20 MG tablet Take 1 tablet by mouth every evening Indications: Blood Cholesterol Abnormal 90 tablet 4    levothyroxine (SYNTHROID) 125 MCG tablet Take 1 tablet by mouth Daily Indications: Impaired Thyroid Function Pt wants only synthroid brand name  Takes [facility] only.  5-14-18. 90 tablet 4    dilTIAZem (DILACOR XR) 180 MG extended release capsule Take 2 capsules by mouth daily Indications: High Blood Pressure Disorder 180 capsule 4    warfarin (JANTOVEN) 2 MG tablet use as directed by [facility] coumadin clinic 180 tabs = 90 days 180 tablet 1    ondansetron (ZOFRAN ODT) 4 MG disintegrating tablet Take 1 tablet by mouth every 8 hours as needed for Nausea or Vomiting 40 tablet 0    Polyethyl Glycol-Propyl Glycol (SYSTANE ULTRA OP) Apply 1 drop to eye 2 times daily      acetaminophen (TYLENOL) 500 MG tablet Take 500 mg by mouth every 6 hours as needed for Pain or Fever Don't take more than 3,000 mg each day, including what's in Norco.      SF 5000 PLUS 1.1 % CREA Place onto teeth 2 times daily        No current facility-administered medications for this visit. Past Medical History:   Diagnosis Date    Arthritis     wrist, back, neck, foot    Blood circulation, collateral     Breathing difficulty     Chronic kidney disease     DVT (deep venous thrombosis) (HCC)     History of blood transfusion 1960's    child birth    Hx of blood clots 2013? ??    left leg    Hyperlipidemia     Hypertension     Pneumonia     Prolonged emergence from general anesthesia     with hysterectomy? ?    Thyroid disease     Unspecified diseases of blood and blood-forming organs       Past Surgical History:   Procedure Laterality Date    CATARACT REMOVAL Right 12/03/2020    CATARACT REMOVAL Left 12/09/2020    COLONOSCOPY      last one before 2005???    CYSTOSCOPY  08/18/2015    HYDRODISTENTION, RPG, LEFT STENT    HYSTERECTOMY (CERVIX STATUS UNKNOWN)  1983??    OVARY REMOVAL  1983    SKIN BIOPSY      VARICOSE VEIN SURGERY  1969??    VENA CAVA FILTER PLACEMENT  01/01/2013     Family History   Problem Relation Age of Onset    Cancer Mother     Breast Cancer Mother 78    Heart Disease Father     Breast Cancer Maternal Grandmother     Breast Cancer Maternal Aunt      Social History     Tobacco Use    Smoking status: Never Smoker    Smokeless tobacco: Never Used   Substance Use Topics    Alcohol use: No        Allergies   Allergen Reactions    Bactrim [Sulfamethoxazole-Trimethoprim] Nausea Only    Tramadol Other (See Comments)     Patient felt \"not right\"    Codeine Nausea And Vomiting       Health Maintenance   Topic Date Due    Pneumococcal 65+ years Vaccine (1 - PCV) Never done    DTaP/Tdap/Td vaccine (1 - Tdap) 08/19/2022 (Originally 3/1/1953)    Shingles vaccine (1 of 2) 08/19/2022 (Originally 3/1/1984)    Lipids  08/25/2022    Flu vaccine (Season Ended) 09/01/2022    Depression Screen  03/29/2023    Annual Wellness Visit (AWV)  2023    COVID-19 Vaccine  Completed    Hepatitis A vaccine  Aged Out    Hepatitis B vaccine  Aged Out    Hib vaccine  Aged Out    Meningococcal (ACWY) vaccine  Aged Out       Subjective:      Review of Systems   Constitutional: Negative for chills, fatigue and fever. HENT: Negative. Respiratory: Positive for shortness of breath. Negative for cough, choking, chest tightness and wheezing. Cardiovascular: Positive for leg swelling. Negative for chest pain and palpitations. Genitourinary: Negative for difficulty urinating, dysuria, vaginal bleeding, vaginal discharge and vaginal pain. Musculoskeletal: Positive for back pain. Negative for arthralgias and myalgias. Skin: Negative. Neurological: Negative for dizziness, facial asymmetry, weakness, light-headedness and headaches. Psychiatric/Behavioral: Negative for self-injury, sleep disturbance and suicidal ideas. Objective:      /60   Pulse 69   Temp 98.1 °F (36.7 °C) (Oral)   Resp 12   Ht 5' 3.5\" (1.613 m)   Wt 158 lb (71.7 kg)   SpO2 95%   BMI 27.55 kg/m²      Physical Exam  Vitals and nursing note reviewed. Constitutional:       Appearance: She is not ill-appearing. HENT:      Head: Normocephalic. Neck:      Vascular: No carotid bruit or JVD. Cardiovascular:      Rate and Rhythm: Normal rate and regular rhythm. Heart sounds: Murmur heard. Comments: No erythema in place    Negative gladis's bilaterally BLE pedal pulses equal and strong   Pulmonary:      Effort: Pulmonary effort is normal. No respiratory distress. Breath sounds: Normal breath sounds. No wheezing. Abdominal:      General: Bowel sounds are normal.      Palpations: Abdomen is soft. Tenderness: There is generalized abdominal tenderness. Musculoskeletal:      Right lower le+ Pitting Edema present. Left lower le+ Pitting Edema present. Skin:     General: Skin is warm and dry.    Neurological: Mental Status: She is alert. Assessment/Plan:           1. Pedal edema  R/o CHF vs cardiomyopathy  Pt declines lab work  Continue with HEATH hose  Await chest x-ray r/o pleural effusion   - ECHO Complete 2D W Doppler W Color; Future    2. At high risk for falls      3. SOB (shortness of breath) on exertion  #1  - ECHO Complete 2D W Doppler W Color; Future  - XR CHEST STANDARD (2 VW); Future    4. Vaginal itching  Likely due to menopuase  - hydrocortisone acetate (VAGISIL) 1 % CREA; Apply 1 Tube topically once for 1 dose  Dispense: 28 g; Refill: 1    5. Presence of IVC filter      6. Anticoagulated on Coumadin  Ask CC regarding stopping coumadin for dental procedure. Pt in agreement with plan       Return in about 6 months (around 12/29/2022) for check up . Reccommended tobaccocessation options including pharmacologic methods, counseled great than 3 minutesduring this visit:  Yes[]  No  []       Patient given educational materials -see patient instructions. Discussed use, benefit, and side effects of prescribedmedications. All patient questions answered. Pt voiced understanding. Reviewedhealth maintenance. Instructed to continue current medications, diet and exercise. Patient agreed with treatment plan. Follow up as directed. Electronicallysigned by LISA Paris CNP on 6/29/2022 at 5:22 PM        On the basis of positive falls risk screening, assessment and plan is as follows: Cal Pham

## 2022-06-30 ENCOUNTER — TELEPHONE (OUTPATIENT)
Dept: FAMILY MEDICINE CLINIC | Age: 87
End: 2022-06-30

## 2022-06-30 NOTE — TELEPHONE ENCOUNTER
ECHO   7/5/22  [facility] main radiology   Arrive 3:45PM   No prep   Bring insurance, ID and mask    Pt informed and understanding with no further questions

## 2022-07-05 ENCOUNTER — HOSPITAL ENCOUNTER (OUTPATIENT)
Dept: GENERAL RADIOLOGY | Age: 87
Discharge: HOME | End: 2022-07-05
Payer: MEDICARE

## 2022-07-05 ENCOUNTER — HOSPITAL ENCOUNTER (OUTPATIENT)
Age: 87
Discharge: HOME | End: 2022-07-05
Payer: MEDICARE

## 2022-07-05 ENCOUNTER — TELEPHONE (OUTPATIENT)
Dept: FAMILY MEDICINE CLINIC | Age: 87
End: 2022-07-05

## 2022-07-05 ENCOUNTER — HOSPITAL ENCOUNTER (OUTPATIENT)
Dept: NON INVASIVE DIAGNOSTICS | Age: 87
Discharge: HOME | End: 2022-07-05
Payer: MEDICARE

## 2022-07-05 DIAGNOSIS — R06.02 SOB (SHORTNESS OF BREATH) ON EXERTION: ICD-10-CM

## 2022-07-05 DIAGNOSIS — R60.0 PEDAL EDEMA: ICD-10-CM

## 2022-07-05 LAB
LV EF: 60 %
LVEF MODALITY: NORMAL

## 2022-07-05 PROCEDURE — 71046 X-RAY EXAM CHEST 2 VIEWS: CPT

## 2022-07-05 PROCEDURE — 93306 TTE W/DOPPLER COMPLETE: CPT

## 2022-07-05 NOTE — TELEPHONE ENCOUNTER
----- Message from Teo Samano DO sent at 7/5/2022  5:02 PM EDT -----  Please let pt know that CXR is WNL  Let me know if questions, thanks!

## 2022-07-05 NOTE — TELEPHONE ENCOUNTER
----- Message from LISA Da Silva Caro, CNP sent at 7/5/2022  4:31 PM EDT -----  Let Ramon Ybarra know her echocardiogram is normal.

## 2022-07-07 ENCOUNTER — OFFICE VISIT (OUTPATIENT)
Dept: UROLOGY | Age: 87
End: 2022-07-07
Payer: MEDICARE

## 2022-07-07 VITALS
DIASTOLIC BLOOD PRESSURE: 70 MMHG | HEIGHT: 66 IN | WEIGHT: 157 LBS | SYSTOLIC BLOOD PRESSURE: 144 MMHG | BODY MASS INDEX: 25.23 KG/M2

## 2022-07-07 DIAGNOSIS — N32.81 OVERACTIVE BLADDER: ICD-10-CM

## 2022-07-07 DIAGNOSIS — R10.2 PELVIC PAIN: ICD-10-CM

## 2022-07-07 DIAGNOSIS — R35.1 NOCTURIA: ICD-10-CM

## 2022-07-07 DIAGNOSIS — Q62.5 DUPLICATED URETER, LEFT: ICD-10-CM

## 2022-07-07 DIAGNOSIS — R31.21 ASYMPTOMATIC MICROSCOPIC HEMATURIA: Primary | ICD-10-CM

## 2022-07-07 DIAGNOSIS — N39.0 RECURRENT UTI: ICD-10-CM

## 2022-07-07 PROCEDURE — 1123F ACP DISCUSS/DSCN MKR DOCD: CPT | Performed by: UROLOGY

## 2022-07-07 PROCEDURE — 99204 OFFICE O/P NEW MOD 45 MIN: CPT | Performed by: UROLOGY

## 2022-07-07 RX ORDER — OXYBUTYNIN CHLORIDE 10 MG/1
10 TABLET, EXTENDED RELEASE ORAL DAILY
Qty: 30 TABLET | Refills: 2 | Status: SHIPPED | OUTPATIENT
Start: 2022-07-07 | End: 2022-10-18 | Stop reason: SINTOL

## 2022-07-07 NOTE — PROGRESS NOTES
Pawan Clemente MD  Urology Clinic office visit  NEW PATIENT    Patient:  Deonna Tierney  YOB: 1934  Date: 7/7/2022    HISTORY OF PRESENT ILLNESS:   The patient is a 80 y.o. female who presents today for evaluation of the following problems:      1. Asymptomatic microscopic hematuria    2. Recurrent UTI    3. Pelvic pain    4. Overactive bladder    5. Nocturia    6. Duplicated ureter, left         Overall the problem(s) : are worsening. Associated Symptoms: No dysuria, gross hematuria. 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 2015 cystoscopy with hydro-distention, bilateral retrograde pyelograms (1 on right and 2 on left with complete left ureteral duplication), left ureteroscopy with balloon dilation of distal left ureter / ureterocele and placement of a left ureteral stent      (Patient's old records, notes and chart reviewed and summarized above.)      Onset years  rec UTIs, intermittent, relieved with Abx- have not had any UTIs recently  Duplicated system on the right side  Was seen by PCP and had microscopic hematuria  Severity is described as mild-moderate. The course of symptoms over time is insidious. Alleviating factors: none  Worsening factors: none  Lower urinary tract symptoms: nocturia, several 5-6 times per night    Urinalysis today:  No results found for this visit on 07/07/22.     Last BUN and creatinine:  Lab Results   Component Value Date    BUN 11 08/25/2021     Lab Results   Component Value Date    CREATININE 0.6 08/25/2021       Imaging Reviewed during this Office Visit:   (results were independently reviewed by physician and radiology report verified)      PAST MEDICAL, FAMILY AND SOCIAL HISTORY:  Past Medical History:   Diagnosis Date    Arthritis     wrist, back, neck, foot    Blood circulation, collateral     Breathing difficulty     Chronic kidney disease     DVT Apply 1 drop to eye 2 times daily      acetaminophen (TYLENOL) 500 MG tablet Take 500 mg by mouth every 6 hours as needed for Pain or Fever Don't take more than 3,000 mg each day, including what's in Norco.      SF 5000 PLUS 1.1 % CREA Place onto teeth 2 times daily          Bactrim [sulfamethoxazole-trimethoprim], Tramadol, and Codeine  Social History     Tobacco Use   Smoking Status Never Smoker   Smokeless Tobacco Never Used       Social History     Substance and Sexual Activity   Alcohol Use No       REVIEW OF SYSTEMS:  Constitutional: negative  Eyes: negative  Respiratory: negative  Cardiovascular: negative  Gastrointestinal: negative  Genitourinary: negative except for what is in HPI  Musculoskeletal: negative  Skin: negative   Neurological: negative  Hematological/Lymphatic: negative  Psychological: negative    Physical Exam:    This a 80 y.o. female      Vitals:    07/07/22 1311   BP: (!) 144/70     Constitutional: Patient in no acute distress. Neuro: Alert and oriented to person, place and time. Psych: mood and affect normal  HEENT negative  Lungs: Respiratory effort is normal  Cardiovascular: Normal peripheral pulses  Abdomen: Soft, non-tender, non-distended   Lymphatics: No palpable lymphadenopathy. Bladder non-tender and not distended. Assessment and Plan      1. Asymptomatic microscopic hematuria    2. Recurrent UTI    3. Pelvic pain    4. Overactive bladder    5. Nocturia    6. Duplicated ureter, left           Plan:       Cystoscopy bilateral retrograde pyelogram under MAC  Check renal US  Trial of Ditropan for bothersome Nocturia    No follow-ups on file.          Prescriptions Ordered:  Orders Placed This Encounter   Medications    oxybutynin (DITROPAN XL) 10 MG extended release tablet     Sig: Take 1 tablet by mouth daily     Dispense:  30 tablet     Refill:  2      Orders Placed:  Orders Placed This Encounter   Procedures    Culture, Urine     Order Specific Question:   Specify (ex-cath, midstream, cysto, etc)?      Answer:   mid   Jet Stager US RENAL COMPLETE     Standing Status:   Future     Standing Expiration Date:   7/8/2023    POCT Urinalysis No Micro (Auto)            Bautista Siegel M.D, MD Blanco Chu, MD [facility]

## 2022-07-08 ENCOUNTER — HOSPITAL ENCOUNTER (OUTPATIENT)
Age: 87
Discharge: HOME | End: 2022-07-08
Payer: MEDICARE

## 2022-07-08 DIAGNOSIS — N39.0 RECURRENT UTI: ICD-10-CM

## 2022-07-08 DIAGNOSIS — R31.21 ASYMPTOMATIC MICROSCOPIC HEMATURIA: ICD-10-CM

## 2022-07-08 DIAGNOSIS — R35.1 NOCTURIA: ICD-10-CM

## 2022-07-08 DIAGNOSIS — Q62.5 DUPLICATED URETER, LEFT: ICD-10-CM

## 2022-07-08 DIAGNOSIS — R10.2 PELVIC PAIN: ICD-10-CM

## 2022-07-08 DIAGNOSIS — N32.81 OVERACTIVE BLADDER: ICD-10-CM

## 2022-07-08 PROCEDURE — 87086 URINE CULTURE/COLONY COUNT: CPT

## 2022-07-10 LAB
ORGANISM: ABNORMAL
URINE CULTURE, ROUTINE: ABNORMAL

## 2022-07-12 ENCOUNTER — TELEPHONE (OUTPATIENT)
Dept: UROLOGY | Age: 87
End: 2022-07-12

## 2022-07-12 DIAGNOSIS — Q62.5 DUPLICATED URETER, LEFT: ICD-10-CM

## 2022-07-12 DIAGNOSIS — N32.81 OVERACTIVE BLADDER: ICD-10-CM

## 2022-07-12 DIAGNOSIS — N39.0 RECURRENT UTI: Primary | ICD-10-CM

## 2022-07-12 DIAGNOSIS — Z01.818 PRE-OP TESTING: ICD-10-CM

## 2022-07-12 DIAGNOSIS — R31.21 ASYMPTOMATIC MICROSCOPIC HEMATURIA: ICD-10-CM

## 2022-07-12 DIAGNOSIS — R35.1 NOCTURIA: ICD-10-CM

## 2022-07-12 DIAGNOSIS — R10.2 PELVIC PAIN: ICD-10-CM

## 2022-07-12 NOTE — TELEPHONE ENCOUNTER
Patient scheduled for Renal Ultrasound  at [address] [place] [address] on 08/12/22. Order mailed with instructions or given to the patient in the office.

## 2022-07-12 NOTE — TELEPHONE ENCOUNTER
Patient scheduled for surgery with Dr. Gali Macdonald on 8/23/22. Surgery consent on arrival. To be cleared by Dr. Henny Saenz. Patient to do pre op fasting labs and urine culture on 8/9/22. Order and surgery instructions mailed to patient. Patient will have an adult over the age of 25 with them at discharge and 24 hours after procedure.

## 2022-07-12 NOTE — TELEPHONE ENCOUNTER
DO NOT TAKE ASPIRIN,  FISH OIL, MOBIC,COUMADIN, IBUPROFEN, MOTRIN-LIKE DRUGS AND ANY MULTIVITAMINS OR OVER THE COUNTER SUPPLEMENTS 14 DAYS PRIOR TO SURGERY. MUST HAVE AN ADULT OVER THE AGE OF 18 WITH YOU AT THE TIME OF THE DISCHARGE AND WITH YOU AT HOME AFTER THE PROCEDURE FOR 24 HOURS          Emma Mesa 1934 Diagnosis: Asymptomatic microscopic hematuria,  Recurrent UTI, Pelvic pain, Overactive bladder, Nocturia, Duplicated ureter, left    Surgical Physician: Dr. Kathy Boeck have been scheduled for the procedure marked below:      Surgery:  Cystoscopy, bilateral retrograde pyelogram            Date: 08/23/2022     Anesthesia: MAC     Place of Service: [address] [place] [address] Second Floor Same Day Surgery         Arrive to same day surgery by:  8:30 am  (Surgery time is subject to change)      INSTRUCTIONS AS MARKED BELOW:    1.  DO NOT eat or drink anything after midnight before surgery. 2.  We prefer you shower or bathe with an antibacterial soap (Dial) the morning of surgery. 3  Please bring a current medication list, photo ID and insurance card(s) with you  4. Okay to take Tylenol  5. If you take Glucophage, Metformin or Janumet, hold 48-hours prior to surgery  6  Take blood pressure or heart medication as directed, if taken in the morning take with a small sip of water  7. The office will call you in 1-2 days after your procedure to schedule a follow up. DATE SENSITIVE TESTING-DO ON THE DATE LISTED *WALK IN *NO APPOINTMENT    1. DO PRE-OP URINE CULTURE AND FASTING LABS ON 8/9/2022. ORDERS INCLUDED.         Date: 7/12/2022

## 2022-07-12 NOTE — TELEPHONE ENCOUNTER
Patient is scheduled for Cystoscopy, bilateral retrograde pyelogram with Dr. Evangelist Nogueira on 8/23/22. We are asking for clearance to proceed and direction for warfarin. Patient sees Dr. Arash Laguerre who fills warfarin and patient goes to Coumadin Clinic.

## 2022-07-12 NOTE — TELEPHONE ENCOUNTER
SURGERY [address] [place] [address] [address] ARTURO DUNN AM OFFENEGG II.KATHY, Corrie Beard [address]      Phone *[phone] *[phone]   Surgical Scheduling Direct Line Phone *[phone] Fax *[phone]      Christlarry Cousins 1934 female    [address] [place] [address]   Marital Status:          Home Phone: [phone]      Cell Phone:    Telephone Information:   Mobile [phone]          Surgeon: Dr. Nair Corporal Surgery Date: 08/23/2022   Time: 10:30 am    Procedure: Cystoscopy, bilateral retrograde pyelogram    Diagnosis: Asymptomatic microscopic hematuria,  Recurrent UTI, Pelvic pain, Overactive bladder, Nocturia, Duplicated ureter, left    Important Medical History:  In [facility]    Special Inst/Equip:     CPT Codes:    53846  Latex Allergy: No     Cardiac Device:  No    Anesthesia:  MAC          Admission Type:  Same Day                        Admit Prior to Day of Surgery: No    Case Location:  Main OR            Preadmission Testing:  Phone Call          PAT Date and Time:______________________________________________________    PAT Confirmation #: ______________________________________________________    Post Op Visit: ___________________________________________________________    Need Preop Cardiac Clearance: Yes    Does Patient have Cardiologist/physician?      Dr Polina Garcia    Surgery Confirmation #: __________________________________________________    Phuong Hernandez: ________________________   Date: __________________________     [employer] Name: [facility]

## 2022-07-13 ENCOUNTER — TELEPHONE (OUTPATIENT)
Dept: UROLOGY | Age: 87
End: 2022-07-13

## 2022-07-13 DIAGNOSIS — Z01.818 PRE-OP TESTING: Primary | ICD-10-CM

## 2022-07-19 ENCOUNTER — HOSPITAL ENCOUNTER (OUTPATIENT)
Dept: PHARMACY | Age: 87
Setting detail: THERAPIES SERIES
Discharge: HOME | End: 2022-07-19
Payer: MEDICARE

## 2022-07-19 DIAGNOSIS — Z51.81 ENCOUNTER FOR THERAPEUTIC DRUG MONITORING: ICD-10-CM

## 2022-07-19 DIAGNOSIS — Z79.01 ANTICOAGULATED ON COUMADIN: ICD-10-CM

## 2022-07-19 DIAGNOSIS — I26.99 PULMONARY EMBOLISM, UNSPECIFIED CHRONICITY, UNSPECIFIED PULMONARY EMBOLISM TYPE, UNSPECIFIED WHETHER ACUTE COR PULMONALE PRESENT (HCC): Primary | ICD-10-CM

## 2022-07-19 LAB — POC INR: 2.4 (ref 0.8–1.2)

## 2022-07-19 PROCEDURE — 85610 PROTHROMBIN TIME: CPT

## 2022-07-19 PROCEDURE — 99211 OFF/OP EST MAY X REQ PHY/QHP: CPT

## 2022-07-19 PROCEDURE — 36416 COLLJ CAPILLARY BLOOD SPEC: CPT

## 2022-07-19 NOTE — PROGRESS NOTES
Medication Management [address]  [phone] (phone)  [phone] (fax)    Ms. Charan Saldaña is a 80 y.o.  female with history of  multiple PEs and has IVC filter who presents today for anticoagulation monitoring and adjustment. Patient verifies current dosing regimen and tablet strength. No missed or extra doses. Patient denies s/s bleeding/bruising/swelling/SOB/chest pain  No blood in urine or stool. No dietary changes. No changes in medication/OTC agents/Herbals. No change in alcohol use or tobacco use. No change in activity level. Patient denies headaches/dizziness/lightheadedness/falls. No vomiting/diarrhea or acute illness. No Procedures scheduled in the future at this time except upcoming bladder procedure scheduled 8/23/22. Per pt, has been advised to hold 14 days prior and is questioning this. Advised pt we'd check on the procedure and provide instructions at next appt as appropriate. Assessment:   Lab Results   Component Value Date    INR 2.40 (H) 07/19/2022    INR 2.30 (H) 06/21/2022    INR 2.50 (H) 05/31/2022     INR therapeutic   Recent Labs     07/19/22  1432   INR 2.40*         Plan:  Continue Coumadin 2 mg MF, 4 mg TWThSaS. Recheck INR in 3 week(s). Patient reminded to call the Anticoagulation Clinic with any signs or symptoms of bleeding or with any medication changes. Patient given instructions utilizing the teach back method. After visit summary printed and reviewed with patient. Discharged ambulatory in no apparent distress.     For Pharmacy Admin Tracking Only    Time Spent (min): 20

## 2022-07-19 NOTE — TELEPHONE ENCOUNTER
Warfarin dosed by [facility] (Coumadin Clinic). Pt does not see Dr. Arthur Michael. Was referred to [facility] for warfarin dosing by Itzel Nugent CNP. We will contact Kumar Coronel's office to recommend duration of warfarin hold and Lovenox bridging. Spoke to Shelli at Dr. Le Raymundo. They don't need any further clearance. \"Just need to know patient will be holding her warfarin at least five days prior to procedure\".

## 2022-07-21 ENCOUNTER — TELEPHONE (OUTPATIENT)
Dept: PHARMACY | Age: 87
End: 2022-07-21

## 2022-07-21 NOTE — TELEPHONE ENCOUNTER
Per 7/12/22 urology encounter for cardiac clearance for cystoscopy 8/23/22- Per Dr. Gali Macdonald office will hold Coumadin 5 days prior to procedure. Per Angy Rodríguez, CNP okay to bridge with Lovenox.     ABW:  71.2 kg  Calculated CrCl:  45.5  Plt:  204  Lovenox dose:  105 mg once daily (1.5mg/kg daily)    Medication Management [facility] Instruction Sheet  Patient:   Sona Mesa     YOB: 1934    Procedure:  Cystoscopy      Date of Procedure:  8/23/22    Day Lovenox AM Lovenox PM Coumadin PM     8/18/22   none   none   none       8/19/22   105 mg   none   none       8/20/22     105 mg   none   none     8/21/22     105 mg   none   none     8/22/22     45 mg   none   none     8/23/22  Procedure      none   none   none     8/24/22     none   105 mg   6 mg     8/25/22     none   105 mg   6 mg      8/26/22     none   105 mg   6 mg     8/27/22     none   105 mg   4 mg     8/28/22     none   105 mg   4 mg             INR to be checked:  8/29/22  Linnette Ross [facility]/7/21/22    Clinical Pharmacist/Date    Any questions please call [facility] at [phone]

## 2022-07-21 NOTE — TELEPHONE ENCOUNTER
Thank you, Orestes Evans, CNP. Yes, the [address] will do the dosing and review with patient.     Thanks again,  Rashmi Clark, PharmD, BCPS

## 2022-08-08 ENCOUNTER — PREP FOR PROCEDURE (OUTPATIENT)
Dept: UROLOGY | Age: 87
End: 2022-08-08

## 2022-08-08 RX ORDER — SODIUM CHLORIDE 9 MG/ML
INJECTION, SOLUTION INTRAVENOUS CONTINUOUS
Status: CANCELLED | OUTPATIENT
Start: 2022-08-23

## 2022-08-09 ENCOUNTER — NURSE ONLY (OUTPATIENT)
Dept: LAB | Age: 87
End: 2022-08-09

## 2022-08-09 ENCOUNTER — HOSPITAL ENCOUNTER (OUTPATIENT)
Age: 87
Discharge: HOME | End: 2022-08-09
Payer: MEDICARE

## 2022-08-09 DIAGNOSIS — R31.21 ASYMPTOMATIC MICROSCOPIC HEMATURIA: ICD-10-CM

## 2022-08-09 DIAGNOSIS — Z01.818 PRE-OP TESTING: ICD-10-CM

## 2022-08-09 DIAGNOSIS — R10.2 PELVIC PAIN: ICD-10-CM

## 2022-08-09 DIAGNOSIS — R35.1 NOCTURIA: ICD-10-CM

## 2022-08-09 DIAGNOSIS — N32.81 OVERACTIVE BLADDER: ICD-10-CM

## 2022-08-09 DIAGNOSIS — Q62.5 DUPLICATED URETER, LEFT: ICD-10-CM

## 2022-08-09 DIAGNOSIS — N39.0 RECURRENT UTI: ICD-10-CM

## 2022-08-09 LAB
ANION GAP SERPL CALCULATED.3IONS-SCNC: 12 MEQ/L (ref 8–16)
BASOPHILS # BLD: 0.5 %
BASOPHILS ABSOLUTE: 0.1 THOU/MM3 (ref 0–0.1)
BUN BLDV-MCNC: 14 MG/DL (ref 7–22)
CALCIUM SERPL-MCNC: 9.3 MG/DL (ref 8.5–10.5)
CHLORIDE BLD-SCNC: 100 MEQ/L (ref 98–111)
CO2: 28 MEQ/L (ref 23–33)
CREAT SERPL-MCNC: 0.7 MG/DL (ref 0.4–1.2)
EOSINOPHIL # BLD: 1.4 %
EOSINOPHILS ABSOLUTE: 0.1 THOU/MM3 (ref 0–0.4)
ERYTHROCYTE [DISTWIDTH] IN BLOOD BY AUTOMATED COUNT: 13.8 % (ref 11.5–14.5)
ERYTHROCYTE [DISTWIDTH] IN BLOOD BY AUTOMATED COUNT: 45.1 FL (ref 35–45)
GFR SERPL CREATININE-BSD FRML MDRD: 79 ML/MIN/1.73M2
GLUCOSE BLD-MCNC: 115 MG/DL (ref 70–108)
HCT VFR BLD CALC: 45 % (ref 37–47)
HEMOGLOBIN: 14.2 GM/DL (ref 12–16)
IMMATURE GRANS (ABS): 0.04 THOU/MM3 (ref 0–0.07)
IMMATURE GRANULOCYTES: 0.4 %
LYMPHOCYTES # BLD: 47.3 %
LYMPHOCYTES ABSOLUTE: 5 THOU/MM3 (ref 1–4.8)
MCH RBC QN AUTO: 28.3 PG (ref 26–33)
MCHC RBC AUTO-ENTMCNC: 31.6 GM/DL (ref 32.2–35.5)
MCV RBC AUTO: 89.6 FL (ref 81–99)
MONOCYTES # BLD: 8 %
MONOCYTES ABSOLUTE: 0.8 THOU/MM3 (ref 0.4–1.3)
NUCLEATED RED BLOOD CELLS: 0 /100 WBC
PLATELET # BLD: 216 THOU/MM3 (ref 130–400)
PMV BLD AUTO: 10.7 FL (ref 9.4–12.4)
POTASSIUM SERPL-SCNC: 4.3 MEQ/L (ref 3.5–5.2)
RBC # BLD: 5.02 MILL/MM3 (ref 4.2–5.4)
SEG NEUTROPHILS: 42.4 %
SEGMENTED NEUTROPHILS ABSOLUTE COUNT: 4.5 THOU/MM3 (ref 1.8–7.7)
SODIUM BLD-SCNC: 140 MEQ/L (ref 135–145)
WBC # BLD: 10.6 THOU/MM3 (ref 4.8–10.8)

## 2022-08-09 PROCEDURE — 87086 URINE CULTURE/COLONY COUNT: CPT

## 2022-08-09 PROCEDURE — 80048 BASIC METABOLIC PNL TOTAL CA: CPT

## 2022-08-09 PROCEDURE — 85025 COMPLETE CBC W/AUTO DIFF WBC: CPT

## 2022-08-09 PROCEDURE — 36415 COLL VENOUS BLD VENIPUNCTURE: CPT

## 2022-08-11 LAB
ORGANISM: ABNORMAL
URINE CULTURE, ROUTINE: ABNORMAL

## 2022-08-12 ENCOUNTER — HOSPITAL ENCOUNTER (OUTPATIENT)
Dept: ULTRASOUND IMAGING | Age: 87
Discharge: HOME | End: 2022-08-12
Payer: MEDICARE

## 2022-08-12 DIAGNOSIS — R10.2 PELVIC PAIN: ICD-10-CM

## 2022-08-12 DIAGNOSIS — N39.0 RECURRENT UTI: ICD-10-CM

## 2022-08-12 DIAGNOSIS — R31.21 ASYMPTOMATIC MICROSCOPIC HEMATURIA: ICD-10-CM

## 2022-08-12 PROCEDURE — 76770 US EXAM ABDO BACK WALL COMP: CPT

## 2022-08-15 ENCOUNTER — TELEPHONE (OUTPATIENT)
Dept: UROLOGY | Age: 87
End: 2022-08-15

## 2022-08-15 NOTE — TELEPHONE ENCOUNTER
Please review urine culture on 8/9/22. Surgery with Dr Raymond Lord on 8/23/22 for a Cystoscopy, bilateral retrograde pyelogram Thanks.

## 2022-08-16 ENCOUNTER — HOSPITAL ENCOUNTER (OUTPATIENT)
Dept: PHARMACY | Age: 87
Setting detail: THERAPIES SERIES
Discharge: HOME | End: 2022-08-16
Payer: MEDICARE

## 2022-08-16 DIAGNOSIS — Z79.01 ANTICOAGULATED ON COUMADIN: ICD-10-CM

## 2022-08-16 DIAGNOSIS — Z51.81 ENCOUNTER FOR THERAPEUTIC DRUG MONITORING: ICD-10-CM

## 2022-08-16 DIAGNOSIS — I26.99 PULMONARY EMBOLISM, UNSPECIFIED CHRONICITY, UNSPECIFIED PULMONARY EMBOLISM TYPE, UNSPECIFIED WHETHER ACUTE COR PULMONALE PRESENT (HCC): Primary | ICD-10-CM

## 2022-08-16 LAB — POC INR: 1.7 (ref 0.8–1.2)

## 2022-08-16 RX ORDER — ENOXAPARIN SODIUM 100 MG/ML
100 INJECTION SUBCUTANEOUS DAILY
Qty: 9 EACH | Refills: 0 | Status: SHIPPED | OUTPATIENT
Start: 2022-08-19 | End: 2022-08-29 | Stop reason: ALTCHOICE

## 2022-08-16 NOTE — PATIENT INSTRUCTIONS
Medication Management [address] [place] [address] Instruction Sheet  Patient:   Karolyn Hauser                                                       YOB: 1934     Procedure:  Cystoscopy                                                         Date of Procedure:  8/23/22     Day Lovenox AM Lovenox PM Coumadin PM      8/18/22    none    none    none         8/19/22    100 mg    none    none         8/20/22       100 mg    none    none      8/21/22       100 mg    none    none      8/22/22       50 mg    none    none      8/23/22  Procedure        none    none    none      8/24/22       none    100 mg    6 mg      8/25/22       none    100 mg    6 mg       8/26/22       none    100 mg    6 mg      8/27/22       none    100 mg    4 mg      8/28/22       none    100 mg    4 mg                                                                                                      INR to be checked:  8/29/22  Nella Long, PharmD 8/16/2022     Clinical Pharmacist/Date     Any questions please call [facility] at [phone]

## 2022-08-16 NOTE — PROGRESS NOTES
Medication Management [address]  [phone] (phone)  [phone] (fax)    Ms. Fela Lu is a 80 y.o.  female with history of PE who presents today for anticoagulation monitoring and adjustment. Patient verifies current dosing regimen and tablet strength. No missed or extra doses. Patient denies s/s bleeding/bruising/swelling/SOB/chest pain. No blood in urine or stool. No dietary changes. Decreased appetite and less food overall. No changes in medication/OTC agents/Herbals. No change in alcohol use or tobacco use. No change in activity level. Patient denies headaches/dizziness/lightheadedness/falls. No vomiting/diarrhea or acute illness. No Procedures scheduled in the future at this time. Procedure on 8/23, bridging with Lovenox (see below instructions). Patient will also need at least one tooth pulled, has not been scheduled. Assessment:   Lab Results   Component Value Date    INR 1.70 (H) 08/16/2022    INR 2.40 (H) 07/19/2022    INR 2.30 (H) 06/21/2022     INR subtherapeutic   Recent Labs     08/16/22  1419   INR 1.70*         Plan:  Patient states she was mildly overwhelmed by Lovenox bridging instructions today but has a good support system with her daughters. States they will help patient. Also encouraged patient to call the clinic with questions and concerns. Continue Coumadin 2mg MF and 4mg SuTuWThSa. Patient is having a procedure on 8/23/22 and will require 4 day hold. Please see instructions below for bridging instructions. Recheck INR in 2 week(s). Patient reminded to call the Anticoagulation Clinic with any signs or symptoms of bleeding or with any medication changes. Patient given instructions utilizing the teach back method. After visit summary printed and reviewed with patient. Discharged ambulatory in no apparent distress.       For Pharmacy Admin Tracking Only    Intervention Detail: New Rx: 1, reason: Needs Additional Therapy  Total # of Interventions Recommended: 1  Total # of Interventions Accepted: 1  Time Spent (min): [address], PharmD  8/16/2022 2:55 PM    Medication Management [address] Instruction Sheet  Patient:   Kamari Villalobos                                                       YOB: 1934     Procedure:  Cystoscopy                                                         Date of Procedure:  8/23/22     Day Lovenox AM Lovenox PM Coumadin PM      8/18/22    none    none    none         8/19/22    100 mg    none    none         8/20/22       100 mg    none    none      8/21/22       100 mg    none    none      8/22/22       50 mg    none    none      8/23/22  Procedure        none    none    none      8/24/22       none    100 mg    6 mg      8/25/22       none    100 mg    6 mg       8/26/22       none    100 mg    6 mg      8/27/22       none    100 mg    4 mg      8/28/22       none    100 mg    4 mg                                                                                                      INR to be checked:  8/29/22  Carin Choi, PharmD 8/16/2022     Clinical Pharmacist/Date     Any questions please call [facility] at [phone]

## 2022-08-19 NOTE — PROGRESS NOTES
Follow all instructions given by your physician    NPO after midnight   Sips of water am of surgery with allowed medications  Bring insurance info and 's license  Wear comfortable clean, loose fitting clothing  No jewelry or contact lenses to be worn day of surgery  No glue on dentures morning of surgery;you will be asked to remove them for surgery. Case for glasses. Shower night before and morning of surgery with a liquid antibacterial soap, dry with fresh clean towel; no lotions, creams or powder. Clean sheets and pillow case on bed night before surgery  Bring medications in original bottles  Bring CPAP/BIPAP machine if you have one ( you may be charged if one is needed in recovery room )   needed at discharge and someone over 18 to stay with you for 24 hours overnight (surgery may be cancelled if you don't have this)  Report to [facility] on 2nd floor  If you would become ill prior to surgery, please call the surgeon  May have a visitor with you, we request that you limit to 2 visitors in pre-op area  Please bring and wear mask  Call [phone] for any questions  Covid questionnaire Complete; Patient negative for symptoms or exposure. See documentation.

## 2022-08-22 ENCOUNTER — HOSPITAL ENCOUNTER (OUTPATIENT)
Age: 87
Discharge: HOME | End: 2022-08-22
Payer: MEDICARE

## 2022-08-22 DIAGNOSIS — Z01.818 PRE-OP TESTING: ICD-10-CM

## 2022-08-22 LAB
EKG ATRIAL RATE: 63 BPM
EKG P AXIS: 67 DEGREES
EKG P-R INTERVAL: 174 MS
EKG Q-T INTERVAL: 396 MS
EKG QRS DURATION: 84 MS
EKG QTC CALCULATION (BAZETT): 405 MS
EKG R AXIS: 12 DEGREES
EKG T AXIS: 36 DEGREES
EKG VENTRICULAR RATE: 63 BPM

## 2022-08-22 PROCEDURE — 93005 ELECTROCARDIOGRAM TRACING: CPT

## 2022-08-22 PROCEDURE — 93010 ELECTROCARDIOGRAM REPORT: CPT | Performed by: INTERNAL MEDICINE

## 2022-08-22 NOTE — PROGRESS NOTES
I called and spoke with [facility]- she will come in today to outpatient [facility] to get her pre op EKG done. Last one in epic was from 2019- Echo good form 7/4/22. Thank you.

## 2022-08-23 ENCOUNTER — HOSPITAL ENCOUNTER (OUTPATIENT)
Age: 87
Setting detail: OUTPATIENT SURGERY
Discharge: HOME | End: 2022-08-23
Attending: UROLOGY | Admitting: UROLOGY
Payer: MEDICARE

## 2022-08-23 ENCOUNTER — ANESTHESIA (OUTPATIENT)
Dept: OPERATING ROOM | Age: 87
End: 2022-08-23
Payer: MEDICARE

## 2022-08-23 ENCOUNTER — ANESTHESIA EVENT (OUTPATIENT)
Dept: OPERATING ROOM | Age: 87
End: 2022-08-23
Payer: MEDICARE

## 2022-08-23 VITALS
WEIGHT: 159 LBS | TEMPERATURE: 97 F | HEIGHT: 66 IN | SYSTOLIC BLOOD PRESSURE: 139 MMHG | DIASTOLIC BLOOD PRESSURE: 75 MMHG | BODY MASS INDEX: 25.55 KG/M2 | RESPIRATION RATE: 18 BRPM | OXYGEN SATURATION: 93 % | HEART RATE: 54 BPM

## 2022-08-23 LAB — INR BLD: 1 (ref 0.85–1.13)

## 2022-08-23 PROCEDURE — 2500000003 HC RX 250 WO HCPCS: Performed by: NURSE ANESTHETIST, CERTIFIED REGISTERED

## 2022-08-23 PROCEDURE — 3700000000 HC ANESTHESIA ATTENDED CARE: Performed by: UROLOGY

## 2022-08-23 PROCEDURE — 3600000012 HC SURGERY LEVEL 2 ADDTL 15MIN: Performed by: UROLOGY

## 2022-08-23 PROCEDURE — 7100000001 HC PACU RECOVERY - ADDTL 15 MIN: Performed by: UROLOGY

## 2022-08-23 PROCEDURE — C1758 CATHETER, URETERAL: HCPCS | Performed by: UROLOGY

## 2022-08-23 PROCEDURE — 6360000002 HC RX W HCPCS: Performed by: NURSE ANESTHETIST, CERTIFIED REGISTERED

## 2022-08-23 PROCEDURE — 6360000002 HC RX W HCPCS

## 2022-08-23 PROCEDURE — 7100000011 HC PHASE II RECOVERY - ADDTL 15 MIN: Performed by: UROLOGY

## 2022-08-23 PROCEDURE — 2709999900 HC NON-CHARGEABLE SUPPLY: Performed by: UROLOGY

## 2022-08-23 PROCEDURE — 7100000000 HC PACU RECOVERY - FIRST 15 MIN: Performed by: UROLOGY

## 2022-08-23 PROCEDURE — C1769 GUIDE WIRE: HCPCS | Performed by: UROLOGY

## 2022-08-23 PROCEDURE — 2580000003 HC RX 258: Performed by: NURSE ANESTHETIST, CERTIFIED REGISTERED

## 2022-08-23 PROCEDURE — 7100000010 HC PHASE II RECOVERY - FIRST 15 MIN: Performed by: UROLOGY

## 2022-08-23 PROCEDURE — 3600000002 HC SURGERY LEVEL 2 BASE: Performed by: UROLOGY

## 2022-08-23 PROCEDURE — 6360000004 HC RX CONTRAST MEDICATION: Performed by: UROLOGY

## 2022-08-23 PROCEDURE — 85610 PROTHROMBIN TIME: CPT

## 2022-08-23 PROCEDURE — 36415 COLL VENOUS BLD VENIPUNCTURE: CPT

## 2022-08-23 PROCEDURE — 3700000001 HC ADD 15 MINUTES (ANESTHESIA): Performed by: UROLOGY

## 2022-08-23 RX ORDER — LIDOCAINE HYDROCHLORIDE 20 MG/ML
INJECTION, SOLUTION EPIDURAL; INFILTRATION; INTRACAUDAL; PERINEURAL PRN
Status: DISCONTINUED | OUTPATIENT
Start: 2022-08-23 | End: 2022-08-23 | Stop reason: SDUPTHER

## 2022-08-23 RX ORDER — DEXAMETHASONE SODIUM PHOSPHATE 10 MG/ML
INJECTION, EMULSION INTRAMUSCULAR; INTRAVENOUS PRN
Status: DISCONTINUED | OUTPATIENT
Start: 2022-08-23 | End: 2022-08-23 | Stop reason: SDUPTHER

## 2022-08-23 RX ORDER — SODIUM CHLORIDE 9 MG/ML
INJECTION, SOLUTION INTRAVENOUS CONTINUOUS PRN
Status: DISCONTINUED | OUTPATIENT
Start: 2022-08-23 | End: 2022-08-23 | Stop reason: SDUPTHER

## 2022-08-23 RX ORDER — CIPROFLOXACIN 500 MG/1
500 TABLET, FILM COATED ORAL 2 TIMES DAILY
Qty: 2 TABLET | Refills: 0 | Status: SHIPPED | OUTPATIENT
Start: 2022-08-23 | End: 2022-08-29

## 2022-08-23 RX ORDER — ONDANSETRON 2 MG/ML
INJECTION INTRAMUSCULAR; INTRAVENOUS PRN
Status: DISCONTINUED | OUTPATIENT
Start: 2022-08-23 | End: 2022-08-23 | Stop reason: SDUPTHER

## 2022-08-23 RX ORDER — PROPOFOL 10 MG/ML
INJECTION, EMULSION INTRAVENOUS PRN
Status: DISCONTINUED | OUTPATIENT
Start: 2022-08-23 | End: 2022-08-23 | Stop reason: SDUPTHER

## 2022-08-23 RX ORDER — PHENAZOPYRIDINE HYDROCHLORIDE 200 MG/1
200 TABLET, FILM COATED ORAL 3 TIMES DAILY PRN
Qty: 9 TABLET | Refills: 0 | Status: SHIPPED | OUTPATIENT
Start: 2022-08-23 | End: 2022-09-08

## 2022-08-23 RX ORDER — SODIUM CHLORIDE 9 MG/ML
INJECTION, SOLUTION INTRAVENOUS CONTINUOUS
Status: DISCONTINUED | OUTPATIENT
Start: 2022-08-23 | End: 2022-08-23 | Stop reason: HOSPADM

## 2022-08-23 RX ADMIN — SODIUM CHLORIDE: 9 INJECTION, SOLUTION INTRAVENOUS at 09:19

## 2022-08-23 RX ADMIN — PROPOFOL 140 MG: 10 INJECTION, EMULSION INTRAVENOUS at 09:28

## 2022-08-23 RX ADMIN — DEXAMETHASONE SODIUM PHOSPHATE 10 MG: 10 INJECTION, EMULSION INTRAMUSCULAR; INTRAVENOUS at 09:26

## 2022-08-23 RX ADMIN — Medication 100 MG: at 09:28

## 2022-08-23 RX ADMIN — CEFAZOLIN 2000 MG: 10 INJECTION, POWDER, FOR SOLUTION INTRAVENOUS at 09:27

## 2022-08-23 RX ADMIN — ONDANSETRON 4 MG: 2 INJECTION INTRAMUSCULAR; INTRAVENOUS at 09:26

## 2022-08-23 ASSESSMENT — PAIN SCALES - GENERAL
PAINLEVEL_OUTOF10: 0

## 2022-08-23 ASSESSMENT — PAIN - FUNCTIONAL ASSESSMENT: PAIN_FUNCTIONAL_ASSESSMENT: 0-10

## 2022-08-23 NOTE — H&P
History and Physical    Patient:  Fela Lu  MRN: [id number]  YOB: 1934    CHIEF COMPLAINT:  rec UTIs    HISTORY OF PRESENT ILLNESS:   The patient is a 80 y.o. female who presents with as above, here for surgery    Patient's old records, notes and chart reviewed and summarized above. Past Medical History:    Past Medical History:   Diagnosis Date    Arthritis     wrist, back, neck, foot    Blood circulation, collateral     Breathing difficulty     Chronic kidney disease     DVT (deep venous thrombosis) ([facility] [address]     History of blood transfusion 1960's    child birth    Hx of blood clots 2013? ??    left leg    Hyperlipidemia     Hypertension     Pneumonia     Prolonged emergence from general anesthesia     with hysterectomy? ?    Thyroid disease     Unspecified diseases of blood and blood-forming organs        Past Surgical History:    Past Surgical History:   Procedure Laterality Date    CATARACT REMOVAL Right 12/03/2020    CATARACT REMOVAL Left 12/09/2020    COLONOSCOPY      last one before 2005???    CYSTOSCOPY  08/18/2015    HYDRODISTENTION, RPG, LEFT STENT    HYSTERECTOMY (CERVIX STATUS UNKNOWN)  1983??    OVARY REMOVAL  1983    SKIN BIOPSY      VARICOSE VEIN SURGERY  1969?? VENA CAVA FILTER PLACEMENT  01/01/2013     Medications Prior to Admission:    Prior to Admission medications    Medication Sig Start Date End Date Taking? Authorizing Provider   enoxaparin (LOVENOX) 100 MG/ML Inject 1 mL into the skin in the morning. 8/19/22   Denise Kc MD   oxybutynin (DITROPAN XL) 10 MG extended release tablet Take 1 tablet by mouth daily 7/7/22   Fadi Whitlock MD   atorvastatin (LIPITOR) 20 MG tablet Take 1 tablet by mouth every evening Indications: Blood Cholesterol Abnormal 3/29/22   LISA Umana - CNP   levothyroxine (SYNTHROID) 125 MCG tablet Take 1 tablet by mouth Daily Indications: Impaired Thyroid Function Pt wants only synthroid brand name  Takes [facility] only. 5-14-18. 3/29/22   LISA Thurston CNP   dilTIAZem (DILACOR XR) 180 MG extended release capsule Take 2 capsules by mouth daily Indications: High Blood Pressure Disorder 3/29/22   LISA Thurston CNP   warfarin (JANTOVEN) 2 MG tablet use as directed by  [facility] 180 tabs = 90 days 2/28/22   Tucker Dubois MD   ondansetron (ZOFRAN ODT) 4 MG disintegrating tablet Take 1 tablet by mouth every 8 hours as needed for Nausea or Vomiting 12/20/21   Maikel LISA Jefferson CNP   Polyethyl Glycol-Propyl Glycol (SYSTANE ULTRA OP) Apply 1 drop to eye 2 times daily    Historical Provider, MD   acetaminophen (TYLENOL) 500 MG tablet Take 500 mg by mouth every 6 hours as needed for Pain or Fever Don't take more than 3,000 mg each day, including what's in [facility].    Historical Provider, MD   SF 5000 PLUS 1.1 % CREA Place onto teeth 2 times daily  1/6/18   Historical Provider, MD       Allergies:  Bactrim [sulfamethoxazole-trimethoprim], Tramadol, and Codeine    Social History:    Social History     Socioeconomic History    Marital status:       Spouse name: Not on file    Number of children: 8    Years of education: Not on file    Highest education level: Not on file   Occupational History    Not on file   Tobacco Use    Smoking status: Never    Smokeless tobacco: Never   Vaping Use    Vaping Use: Never used   Substance and Sexual Activity    Alcohol use: No    Drug use: No    Sexual activity: Not Currently   Other Topics Concern    Not on file   Social History Narrative    Not on file     Social Determinants of Health     Financial Resource Strain: Not on file   Food Insecurity: Not on file   Transportation Needs: Not on file   Physical Activity: Insufficiently Active    Days of Exercise per Week: 7 days    Minutes of Exercise per Session: 10 min   Stress: Not on file   Social Connections: Not on file   Intimate Partner Violence: Not on file   Housing Stability: Not on file       Family History: Family History   Problem Relation Age of Onset    Cancer Mother     Breast Cancer Mother 78    Heart Disease Father     Breast Cancer Maternal Grandmother     Breast Cancer Maternal Aunt        REVIEW OF SYSTEMS:  Constitutional: negative  Eyes: negative  Respiratory: negative  Cardiovascular: negative  Gastrointestinal: negative  Genitourinary: see HPI  Musculoskeletal: negative  Skin: negative   Neurological: negative  Hematological/Lymphatic: negative  Psychological: negative    Physical Exam:      Patient Vitals for the past 24 hrs:   BP Temp Temp src Pulse Resp SpO2 Height Weight   08/23/22 0844 -- -- -- -- -- -- 5' 6\" (1.676 m) 159 lb (72.1 kg)   08/23/22 0830 136/73 97.1 °F (36.2 °C) Temporal 60 16 94 % -- --     Constitutional: Patient in no acute distress; Neuro: alert and oriented to person place and time. Psych: Mood and affect normal.  Skin: Normal  Lungs: Respiratory effort normal, CTA  Cardiovascular:  Normal peripheral pulses; no murmur  Abdomen: Soft, non-tender, non-distended with no CVA, flank pain, hepatosplenomegaly or hernia. Kidneys normal.  Bladder non-tender and not distended. LABS:   No results for input(s): WBC, HGB, HCT, MCV, PLT in the last 72 hours. No results for input(s): NA, K, CL, CO2, PHOS, BUN, CREATININE, CA in the last 72 hours. No results found for: PSA        Urinalysis: No results for input(s): COLORU, PHUR, LABCAST, WBCUA, RBCUA, MUCUS, TRICHOMONAS, YEAST, BACTERIA, CLARITYU, SPECGRAV, LEUKOCYTESUR, UROBILINOGEN, Delfin [place] in the last 72 hours.     Invalid input(s): NITRATE, GLUCOSEUKETONESUAMORPHOUS     -----------------------------------------------------------------      Assessment and Plan   Impression:    Patient Active Problem List   Diagnosis    Hypertension    Thyroid disease    Physical deconditioning    Anticoagulated on Coumadin    Duplicated collecting system    Pulmonary embolism ([facility] [address]    Influenza B    Acquired hypothyroidism Presence of IVC filter    Encounter for therapeutic drug monitoring    COVID-19       Plan:   Risks: I discussed all the risks, benefits, alternatives and possible complications or surgery as well as expectations and post-op recovery.       Consent obtained; cystoscopy bilateral retrograde pyelograms in OR today    Everardo Miller M.D, MD  8:51 AM 8/23/2022

## 2022-08-23 NOTE — PROGRESS NOTES
9572- pt to pacu, awake in bed. Denies pain, nausea. Resp easy, unlabored. Vss. Pt appears in no acute distress. 1010- pt resting in bed eyes open. Continues to deny pain, nausea. Vss. Continue to monitor. 1026- pt meets criteria for discharge from pacu    1035-[facility] ONESIMO Page given report. Pt returned to [facility].

## 2022-08-23 NOTE — DISCHARGE INSTRUCTIONS
Call your doctor for the following:   Chills   Temperature greater than 101   Pain that is not tolerable despite taking pain medicine as ordered   Inability to urinate >12 hours   There is increased swelling, redness or warmth at surgical site   There is increased drainage or bleeding from surgical site         No alcoholic beverages, no driving or operating machinery, no making important decisions for 24 hours. Take your prescribed medications (if any) as instructed. You may have a normal diet but should eat lightly on the day of surgery. Drink plenty of fluids. You may notice some burning or blood with urination, this is normal and should improve over next few days. You may also take motrin/ibuprofen for pain control, you can alternate this with your other pain medications. Be sure to take over the counter stool softeners if you notice constipation or hard stools. Follow up with Dr. Eddie Gonzalez, office will arrange.

## 2022-08-23 NOTE — ANESTHESIA PRE PROCEDURE
Department of Anesthesiology  Preprocedure Note       Name:  Huong Morrissey   Age:  80 y.o.  :  1934                                          MRN:  [id number]         Date:  2022      Surgeon: Sarah Bryant):  Yaya Herrmann MD    Procedure: Procedure(s):  CYSTOSCOPY, BILATERAL RETROGRADE PYELOGRAM    Medications prior to admission:   Prior to Admission medications    Medication Sig Start Date End Date Taking? Authorizing Provider   enoxaparin (LOVENOX) 100 MG/ML Inject 1 mL into the skin in the morning. 22   Sammy Novoa MD   oxybutynin (DITROPAN XL) 10 MG extended release tablet Take 1 tablet by mouth daily 22   Yaya Herrmann MD   atorvastatin (LIPITOR) 20 MG tablet Take 1 tablet by mouth every evening Indications: Blood Cholesterol Abnormal 3/29/22   LISA Vogel CNP   levothyroxine (SYNTHROID) 125 MCG tablet Take 1 tablet by mouth Daily Indications: Impaired Thyroid Function Pt wants only synthroid brand name  Takes [facility] only.  18. 3/29/22   LISA Vogel CNP   dilTIAZem (DILACOR XR) 180 MG extended release capsule Take 2 capsules by mouth daily Indications: High Blood Pressure Disorder 3/29/22   LISA Vogel CNP   warfarin (JANTOVEN) 2 MG tablet use as directed by [facility] coumadin clinic 180 tabs = 90 days 22   Sammy Novoa MD   ondansetron (ZOFRAN ODT) 4 MG disintegrating tablet Take 1 tablet by mouth every 8 hours as needed for Nausea or Vomiting 21   LISA Hightower CNP   Polyethyl Glycol-Propyl Glycol (SYSTANE ULTRA OP) Apply 1 drop to eye 2 times daily    Historical Provider, MD   acetaminophen (TYLENOL) 500 MG tablet Take 500 mg by mouth every 6 hours as needed for Pain or Fever Don't take more than 3,000 mg each day, including what's in Michelle Gonzalez.    Historical Provider, MD   SF 5000 PLUS 1.1 % CREA Place onto teeth 2 times daily  18   Historical Provider, MD       Current medications:    Current Facility-Administered Medications   Medication Dose Route Frequency Provider Last Rate Last Admin    0.9 % sodium chloride infusion   IntraVENous Continuous Bharat Watson CMA (AAMA)        ceFAZolin (ANCEF) 2000 mg in dextrose 5 % 50 mL IVPB  2,000 mg IntraVENous 30 Min Pre-Op Bharat Watson CMA (AAMA)           Allergies: Allergies   Allergen Reactions    Bactrim [Sulfamethoxazole-Trimethoprim] Nausea Only    Tramadol Other (See Comments)     Patient felt \"not right\"    Codeine Nausea And Vomiting       Problem List:    Patient Active Problem List   Diagnosis Code    Hypertension I10    Thyroid disease E07.9    Physical deconditioning R53.81    Anticoagulated on Coumadin F80.34    Duplicated collecting system Q62.5    Pulmonary embolism (HCC) I26.99    Influenza B J10.1    Acquired hypothyroidism E03.9    Presence of IVC filter Z95.828    Encounter for therapeutic drug monitoring Z51.81    COVID-19 U07.1       Past Medical History:        Diagnosis Date    Arthritis     wrist, back, neck, foot    Blood circulation, collateral     Breathing difficulty     Chronic kidney disease     DVT (deep venous thrombosis) (HCC)     History of blood transfusion 1960's    child birth   Ulysses Rippey Hx of blood clots 2013? ??    left leg    Hyperlipidemia     Hypertension     Pneumonia     Prolonged emergence from general anesthesia     with hysterectomy? ?    Thyroid disease     Unspecified diseases of blood and blood-forming organs        Past Surgical History:        Procedure Laterality Date    CATARACT REMOVAL Right 12/03/2020    CATARACT REMOVAL Left 12/09/2020    COLONOSCOPY      last one before 2005???    CYSTOSCOPY  08/18/2015    HYDRODISTENTION, RPG, LEFT STENT    HYSTERECTOMY (CERVIX STATUS UNKNOWN)  1983??    OVARY REMOVAL  1983    SKIN BIOPSY      VARICOSE VEIN SURGERY  1969??    VENA CAVA FILTER PLACEMENT  01/01/2013       Social History:    Social History     Tobacco Use    Smoking status: Never    Smokeless tobacco: Never   Substance Use Topics    Alcohol use: No                                Counseling given: Not Answered      Vital Signs (Current):   Vitals:    08/19/22 1017 08/23/22 0830 08/23/22 0844   BP:  136/73    Pulse:  60    Resp:  16    Temp:  97.1 °F (36.2 °C)    TempSrc:  Temporal    SpO2:  94%    Weight: 156 lb (70.8 kg)  159 lb (72.1 kg)   Height: 5' 6\" (1.676 m)  5' 6\" (1.676 m)                                              BP Readings from Last 3 Encounters:   08/23/22 136/73   07/07/22 (!) 144/70   06/29/22 124/60       NPO Status: Time of last liquid consumption: 0700                        Time of last solid consumption: 1600                        Date of last liquid consumption: 08/23/22                        Date of last solid food consumption: 08/22/22    BMI:   Wt Readings from Last 3 Encounters:   08/23/22 159 lb (72.1 kg)   07/07/22 157 lb (71.2 kg)   06/29/22 158 lb (71.7 kg)     Body mass index is 25.66 kg/m².     CBC:   Lab Results   Component Value Date/Time    WBC 10.6 08/09/2022 10:54 AM    RBC 5.02 08/09/2022 10:54 AM    HGB 14.2 08/09/2022 10:54 AM    HCT 45.0 08/09/2022 10:54 AM    MCV 89.6 08/09/2022 10:54 AM    RDW 14.2 02/04/2018 03:33 PM     08/09/2022 10:54 AM       CMP:   Lab Results   Component Value Date/Time     08/09/2022 10:54 AM    K 4.3 08/09/2022 10:54 AM    K 3.7 09/08/2020 06:07 PM     08/09/2022 10:54 AM    CO2 28 08/09/2022 10:54 AM    BUN 14 08/09/2022 10:54 AM    CREATININE 0.7 08/09/2022 10:54 AM    AGRATIO 1.0 05/29/2015 08:42 AM    LABGLOM 79 08/09/2022 10:54 AM    GLUCOSE 115 08/09/2022 10:54 AM    GLUCOSE 121 05/29/2015 08:42 AM    PROT 7.5 08/25/2021 10:26 AM    CALCIUM 9.3 08/09/2022 10:54 AM    BILITOT 0.5 08/25/2021 10:26 AM    ALKPHOS 104 08/25/2021 10:26 AM    AST 35 08/25/2021 10:26 AM    ALT 24 08/25/2021 10:26 AM       POC Tests: No results for input(s): POCGLU, POCNA, POCK, POCCL, POCBUN, POCHEMO, POCHCT in the last 72

## 2022-08-23 NOTE — ANESTHESIA POSTPROCEDURE EVALUATION
Department of Anesthesiology  Postprocedure Note    Patient: Karolyn Hauser  MRN: [id number]  YOB: 1934  Date of evaluation: 8/23/2022      Procedure Summary     Date: 08/23/22 Room / Location: [facility]  [facility]    Anesthesia Start: 2914 Anesthesia Stop: 7122    Procedure: CYSTOSCOPY, BILATERAL RETROGRADE PYELOGRAM (Bladder) Diagnosis:       Asymptomatic microscopic hematuria      Recurrent UTI      Overactive bladder      Nocturia      (Asymptomatic microscopic hematuria [R31.21])      (Recurrent UTI [N39.0])      (Overactive bladder [N32.81])      (Nocturia [R35.1])    Surgeons: Kumar Núñez MD Responsible Provider: Damaris Willson MD    Anesthesia Type: MAC ASA Status: 2          Anesthesia Type: No value filed. Harper Phase I: Harper Score: 9    Harper Phase II: Harper Score: 9      Anesthesia Post Evaluation    Patient location during evaluation: bedside  Patient participation: complete - patient cannot participate  Level of consciousness: awake and alert  Airway patency: patent  Nausea & Vomiting: no nausea and no vomiting  Complications: no  Cardiovascular status: hemodynamically stable  Respiratory status: acceptable  Hydration status: euvolemic      [facility]  POST-ANESTHESIA NOTE       Name:  Karolyn Hauser                                         Age:  80 y.o.   MRN:  [id number]      Last Vitals:  /75   Pulse 54   Temp 97 °F (36.1 °C) (Temporal)   Resp 18   Ht 5' 6\" (1.676 m)   Wt 159 lb (72.1 kg)   SpO2 93%   BMI 25.66 kg/m²   Patient Vitals for the past 4 hrs:   BP Temp Temp src Pulse Resp SpO2   08/23/22 1138 139/75 -- -- 54 18 93 %   08/23/22 1106 (!) 116/58 97 °F (36.1 °C) Temporal (!) 47 18 93 %   08/23/22 1045 (!) 119/57 -- -- (!) 46 16 94 %   08/23/22 1035 (!) 120/58 -- -- 50 19 96 %   08/23/22 1030 (!) 119/58 -- -- (!) 45 18 97 %   08/23/22 1025 (!) 121/58 -- -- (!) 46 (!) 41 97 %   08/23/22 1020 (!) 141/62 -- -- (!) 46 17 96 %   08/23/22 1015 (!) Jacquelyn is here today for her NST due to advanced maternal age.  Patient states has some tightening in abdomen lately.   She is      , gestational age 36w6d.      Blood pressure:    BP Readings from Last 1 Encounters:   18 118/70        Urine dip:   Protein:  None  Glucose:  negative    She:  denies headache.  denies blurred vision.  denies right upper quadrant pain.  denies severe nausea.   denies vomiting.    Refer to providers note for NST result.         Verbal result given to patient. NST signed and sent to scanning.    121/58 -- -- (!) 47 23 97 %   08/23/22 1010 (!) 117/58 -- -- (!) 43 16 97 %   08/23/22 1005 (!) 123/59 -- -- (!) 46 19 96 %   08/23/22 1000 (!) 124/58 -- -- (!) 47 18 97 %   08/23/22 0955 (!) 124/59 98.2 °F (36.8 °C) -- (!) 48 17 97 %       Level of Consciousness:  Awake    Respiratory:  Stable    Oxygen Saturation:  Stable    Cardiovascular:  Stable    Hydration:  Adequate    PONV:  Stable    Post-op Pain:  Adequate analgesia    Post-op Assessment:  No apparent anesthetic complications    Additional Follow-Up / Treatment / Comment:  None    Cindy Hines MD  August 23, 2022   12:44 PM

## 2022-08-24 NOTE — OP NOTE
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 Normal pyelography bilaterally. Normal bladder. No evidence of significant prolapse              DETAILS OF THE PROCEDURE:  The patient was correctly identified in the preoperative holding area. The patient  was brought back to the operating room and placed in the dorsal lithotomy  position. Anesthesia was administered; antibiotics administered by Anesthesia. EPC cuffs  were on and functional. The patient was then prepped and draped in the usual sterile fashion. Once an appropriate time out had been performed, with all parties  consenting, a 25 [place]er cystoscope with a 30-degree lens was placed through  the urethra into the bladder. The right ureteral orifice was cannulated with a 5 [place]er ureteral catheter. Contrast was injected and the right ureter and renal pelvis were identified, with  no abnormalities or filling defects. The catheter was removed and placed in the left ureteral orifice. The procedure was repeated once again with  no abnormalities or filling defects present. There is noted to be 2 ureteral orifice ease on the left with complete left ureteral duplication and double mobility system.   No filling defects in either system A thorough and complete cystoscopy was performed with no abnormalities involving the bladder or mucosa. The bladder was drained and the cystoscope was removed. The patient tolerated the procedure well and was sent to the PACU for postoperative monitoring.       Plan:  The patient was discharged home in stable condition with instructions to  follow up in clinic in 2 to 3 months with advanced practice provider after trial of her Ditropan daily    Electronically signed by Brendan Villa M.D, MD on 8/24/2022 at 7:52 AM

## 2022-08-25 DIAGNOSIS — Z79.01 ANTICOAGULATED ON COUMADIN: Primary | ICD-10-CM

## 2022-08-25 DIAGNOSIS — I26.99 PULMONARY EMBOLISM, UNSPECIFIED CHRONICITY, UNSPECIFIED PULMONARY EMBOLISM TYPE, UNSPECIFIED WHETHER ACUTE COR PULMONALE PRESENT (HCC): ICD-10-CM

## 2022-08-29 ENCOUNTER — HOSPITAL ENCOUNTER (OUTPATIENT)
Dept: PHARMACY | Age: 87
Setting detail: THERAPIES SERIES
Discharge: HOME | End: 2022-08-29
Payer: MEDICARE

## 2022-08-29 ENCOUNTER — TELEPHONE (OUTPATIENT)
Dept: FAMILY MEDICINE CLINIC | Age: 87
End: 2022-08-29

## 2022-08-29 DIAGNOSIS — I26.99 PULMONARY EMBOLISM, UNSPECIFIED CHRONICITY, UNSPECIFIED PULMONARY EMBOLISM TYPE, UNSPECIFIED WHETHER ACUTE COR PULMONALE PRESENT (HCC): Primary | ICD-10-CM

## 2022-08-29 DIAGNOSIS — Z79.01 ANTICOAGULATED ON COUMADIN: ICD-10-CM

## 2022-08-29 DIAGNOSIS — Z51.81 ENCOUNTER FOR THERAPEUTIC DRUG MONITORING: ICD-10-CM

## 2022-08-29 LAB — POC INR: 1.4 (ref 0.8–1.2)

## 2022-08-29 PROCEDURE — 99212 OFFICE O/P EST SF 10 MIN: CPT | Performed by: PHARMACIST

## 2022-08-29 PROCEDURE — 36416 COLLJ CAPILLARY BLOOD SPEC: CPT | Performed by: PHARMACIST

## 2022-08-29 PROCEDURE — 85610 PROTHROMBIN TIME: CPT | Performed by: PHARMACIST

## 2022-09-08 ENCOUNTER — HOSPITAL ENCOUNTER (OUTPATIENT)
Dept: PHARMACY | Age: 87
Setting detail: THERAPIES SERIES
Discharge: HOME | End: 2022-09-08
Payer: MEDICARE

## 2022-09-08 DIAGNOSIS — I26.99 PULMONARY EMBOLISM, UNSPECIFIED CHRONICITY, UNSPECIFIED PULMONARY EMBOLISM TYPE, UNSPECIFIED WHETHER ACUTE COR PULMONALE PRESENT (HCC): Primary | ICD-10-CM

## 2022-09-08 DIAGNOSIS — Z79.01 ANTICOAGULATED ON COUMADIN: ICD-10-CM

## 2022-09-08 DIAGNOSIS — Z51.81 ENCOUNTER FOR THERAPEUTIC DRUG MONITORING: ICD-10-CM

## 2022-09-08 LAB — POC INR: 1.8 (ref 0.8–1.2)

## 2022-09-08 NOTE — PROGRESS NOTES
Medication Management [address]  [phone] (phone)  [phone] (fax)    Ms. David Mckoy is a 80 y.o.  female with history of PE who presents today for anticoagulation monitoring and adjustment. Patient verifies current dosing regimen and tablet strength. No missed or extra doses. Patient denies s/s bleeding/bruising/swelling/SOB/chest pain  No blood in urine or stool. No dietary changes. No changes in medication/OTC agents/Herbals. Had started Ditropan XL, but stopped a few days ago due to dry mouth and dry eyes, planning to discuss with her MD.  No change in alcohol use or tobacco use. No change in activity level. Patient denies headaches/dizziness/lightheadedness/falls. No vomiting/diarrhea or acute illness. No Procedures scheduled in the future at this time. Assessment:   Lab Results   Component Value Date    INR 1.80 (H) 09/08/2022    INR 1.40 (H) 08/29/2022    INR 1.00 08/23/2022     INR subtherapeutic   Recent Labs     09/08/22  1402   INR 1.80*         Plan:  Increase Coumadin 2mg M and 4mg TWThFSaS (8.3% increase). Recheck INR in 2 week(s). Patient reminded to call the Anticoagulation Clinic with signs or symptoms of bleeding or with any medication changes. Patient given instructions utilizing the teach back method. After visit summary printed and reviewed with patient. Discharged ambulatory in no apparent distress.     For Pharmacy Admin Tracking Only    Intervention Detail: Dose Adjustment: 1, reason: Therapy Optimization  Total # of Interventions Recommended: 1  Total # of Interventions Accepted: 1  Time Spent (min): 20

## 2022-09-22 ENCOUNTER — HOSPITAL ENCOUNTER (OUTPATIENT)
Dept: PHARMACY | Age: 87
Setting detail: THERAPIES SERIES
Discharge: HOME | End: 2022-09-22
Payer: MEDICARE

## 2022-09-22 DIAGNOSIS — Z51.81 ENCOUNTER FOR THERAPEUTIC DRUG MONITORING: ICD-10-CM

## 2022-09-22 DIAGNOSIS — Z95.828 PRESENCE OF IVC FILTER: ICD-10-CM

## 2022-09-22 DIAGNOSIS — I26.99 PULMONARY EMBOLISM, UNSPECIFIED CHRONICITY, UNSPECIFIED PULMONARY EMBOLISM TYPE, UNSPECIFIED WHETHER ACUTE COR PULMONALE PRESENT (HCC): Primary | ICD-10-CM

## 2022-09-22 DIAGNOSIS — Z79.01 ANTICOAGULATED ON COUMADIN: ICD-10-CM

## 2022-09-22 LAB — POC INR: 2.3 (ref 0.8–1.2)

## 2022-09-22 PROCEDURE — 36416 COLLJ CAPILLARY BLOOD SPEC: CPT | Performed by: PHARMACIST

## 2022-09-22 PROCEDURE — 99211 OFF/OP EST MAY X REQ PHY/QHP: CPT | Performed by: PHARMACIST

## 2022-09-22 PROCEDURE — 85610 PROTHROMBIN TIME: CPT | Performed by: PHARMACIST

## 2022-09-22 RX ORDER — WARFARIN SODIUM 2 MG/1
TABLET ORAL
Qty: 180 TABLET | Refills: 1 | Status: SHIPPED | OUTPATIENT
Start: 2022-09-22

## 2022-09-22 NOTE — PROGRESS NOTES
Medication Management [address]  [phone] (phone)  [phone] (fax)    Ms. Andres Ruano is a 80 y.o.  female with history of PE who presents today for anticoagulation monitoring and adjustment. Patient verifies current dosing regimen and tablet strength. No missed or extra doses. Patient denies s/s bleeding/bruising/swelling/SOB/chest pain  No blood in urine or stool. Appetite low, states this has been ongoing. No changes in medication/OTC agents/Herbals. No change in alcohol use or tobacco use. Less active. Patient denies headaches/dizziness/lightheadedness/falls. No vomiting/diarrhea or acute illness. No Procedures scheduled in the future at this time. Assessment:   Lab Results   Component Value Date    INR 2.30 (H) 09/22/2022    INR 1.80 (H) 09/08/2022    INR 1.40 (H) 08/29/2022     INR therapeutic   Recent Labs     09/22/22  1413   INR 2.30*     First therapeutic INR after a recent dose change. Plan:  Continue Coumadin 2mg M and 4mg TWThFSaS. Recheck INR in 3 week(s). Patient reminded to call the Anticoagulation Clinic with any signs or symptoms of bleeding or with any medication changes. Patient given instructions utilizing the teach back method. After visit summary printed and reviewed with patient. Discharged ambulatory in no apparent distress.       For Pharmacy Admin Tracking Only    Time Spent (min): 20

## 2022-09-28 ENCOUNTER — HOSPITAL ENCOUNTER (OUTPATIENT)
Age: 87
Discharge: HOME | End: 2022-09-28
Payer: MEDICARE

## 2022-09-28 DIAGNOSIS — Z79.01 ANTICOAGULATED ON COUMADIN: ICD-10-CM

## 2022-09-28 DIAGNOSIS — I26.99 PULMONARY EMBOLISM, UNSPECIFIED CHRONICITY, UNSPECIFIED PULMONARY EMBOLISM TYPE, UNSPECIFIED WHETHER ACUTE COR PULMONALE PRESENT (HCC): ICD-10-CM

## 2022-09-28 LAB
ALBUMIN SERPL-MCNC: 4.1 G/DL (ref 3.5–5.1)
ALP BLD-CCNC: 104 U/L (ref 38–126)
ALT SERPL-CCNC: 17 U/L (ref 11–66)
AST SERPL-CCNC: 25 U/L (ref 5–40)
BILIRUB SERPL-MCNC: 0.6 MG/DL (ref 0.3–1.2)
BILIRUBIN DIRECT: < 0.2 MG/DL (ref 0–0.3)
TOTAL PROTEIN: 7.3 G/DL (ref 6.1–8)

## 2022-09-28 PROCEDURE — 36415 COLL VENOUS BLD VENIPUNCTURE: CPT

## 2022-09-28 PROCEDURE — 80076 HEPATIC FUNCTION PANEL: CPT

## 2022-10-13 ENCOUNTER — HOSPITAL ENCOUNTER (OUTPATIENT)
Dept: PHARMACY | Age: 87
Setting detail: THERAPIES SERIES
Discharge: HOME | End: 2022-10-13
Payer: MEDICARE

## 2022-10-13 DIAGNOSIS — Z51.81 ENCOUNTER FOR THERAPEUTIC DRUG MONITORING: ICD-10-CM

## 2022-10-13 DIAGNOSIS — I26.99 PULMONARY EMBOLISM, UNSPECIFIED CHRONICITY, UNSPECIFIED PULMONARY EMBOLISM TYPE, UNSPECIFIED WHETHER ACUTE COR PULMONALE PRESENT (HCC): Primary | ICD-10-CM

## 2022-10-13 DIAGNOSIS — Z79.01 ANTICOAGULATED ON COUMADIN: ICD-10-CM

## 2022-10-13 LAB — POC INR: 2.2 (ref 0.8–1.2)

## 2022-10-13 PROCEDURE — 36416 COLLJ CAPILLARY BLOOD SPEC: CPT

## 2022-10-13 PROCEDURE — 99211 OFF/OP EST MAY X REQ PHY/QHP: CPT

## 2022-10-13 PROCEDURE — 85610 PROTHROMBIN TIME: CPT

## 2022-10-18 ENCOUNTER — OFFICE VISIT (OUTPATIENT)
Dept: UROLOGY | Age: 87
End: 2022-10-18
Payer: MEDICARE

## 2022-10-18 DIAGNOSIS — Q62.5 DUPLICATED URETER, LEFT: Primary | ICD-10-CM

## 2022-10-18 DIAGNOSIS — N32.81 OVERACTIVE BLADDER: ICD-10-CM

## 2022-10-18 PROCEDURE — 1123F ACP DISCUSS/DSCN MKR DOCD: CPT

## 2022-10-18 PROCEDURE — 99213 OFFICE O/P EST LOW 20 MIN: CPT

## 2022-10-18 RX ORDER — OXYBUTYNIN CHLORIDE 5 MG/1
5 TABLET, EXTENDED RELEASE ORAL DAILY
Qty: 30 TABLET | Refills: 0 | Status: SHIPPED | OUTPATIENT
Start: 2022-10-18 | End: 2022-11-17

## 2022-10-18 NOTE — PATIENT INSTRUCTIONS
-Follow up with Dr. Violet Wong in 3 months or sooner if needed  -Please go to the ED if you develop fever, chills, nausea, vomiting, chest pain, SOB, calf pain, feelings of incomplete emptying, or should you otherwise feel they need evaluated

## 2022-10-18 NOTE — PROGRESS NOTES
Patient:  Emma Mesa  YOB: 1934  Date: 10/18/2022    HISTORY OF PRESENT ILLNESS:   The patient is a 80 y.o. female who presents today for evaluation of the following problems: asymptomatic microscopic hematuria, duplicated left ureteral system complete, post operative evaluation       Patient is s/p cystoscopy bilateral retrograde pyelogram with Dr. Nickie Armstrong on 8/23/2022 secondary to asymptomatic microscopic hematuria  -She was discharged and instructed to follow up in 2 to 3 months with ANITHA after trial of daily ditropan (for sx of nocturia)   -Also follows in the office for recurrent UTI, pelvic pain, overactive bladder, nocturia, and a duplicated left ureter     -Patient had a cystoscopy with hydro-distension, bilateral retrograde pyelogram, left ureteroscopy with balloon dilation of the distal ureter/ureterocele and placement of a left ureteral stent in 2015 by Dr. Neto Montero secondary to Bladder pain and left duplicated system with evidence  of left ureterocele. This occurred on 08/8/2015.     10/18/22     Overall the problem(s) : are improving. Associated Symptoms: No dysuria, gross hematuria. States she notes worsening of symptoms with dietary changes   Has been taking ditropan 10mg every other day due to side effects, would like to try 5 mg daily. Feels like she is emptying her bladder  Denies lower abdomina pressure and pain     No fevers or chills. No nausea or vomiting. No chest pain or shortness of breath. No calf pain. Pain Controlled. UA: patient reports that she is not able to give a urine sample   PVR: patient refused. Patient has not been taking ditropan 10 mg daily. Has been taking 10 mg every other day due to dry mouth. States she knows she is emptying well.   Pain Scale 0        Imaging Reviewed during this Office Visit:   (results were independently reviewed by physician and radiology report verified)  I independently reviewed and verified the images and reports from:    No results found. Urinalysis today:  No results found for this visit on 10/18/22. Last BUN and creatinine:  Lab Results   Component Value Date    BUN 14 08/09/2022     Lab Results   Component Value Date    CREATININE 0.7 08/09/2022       PAST MEDICAL, FAMILY AND SOCIAL HISTORY UPDATE:  Past Medical History:   Diagnosis Date    Arthritis     wrist, back, neck, foot    Blood circulation, collateral     Breathing difficulty     Chronic kidney disease     DVT (deep venous thrombosis) ([facility] [address]     History of blood transfusion 1960's    child birth    Hx of blood clots 2013? ??    left leg    Hyperlipidemia     Hypertension     Pneumonia     Prolonged emergence from general anesthesia     with hysterectomy? ?    Thyroid disease     Unspecified diseases of blood and blood-forming organs      Past Surgical History:   Procedure Laterality Date    CATARACT REMOVAL Right 12/03/2020    CATARACT REMOVAL Left 12/09/2020    COLONOSCOPY      last one before 2005???    CYSTOSCOPY  08/18/2015    HYDRODISTENTION, RPG, LEFT STENT    CYSTOSCOPY N/A 8/23/2022    CYSTOSCOPY, BILATERAL RETROGRADE PYELOGRAM performed by Jose Antonio Irving MD at [address]  1983??    [facility]?? VENA CAVA FILTER PLACEMENT  01/01/2013     Family History   Problem Relation Age of Onset    Cancer Mother     Breast Cancer Mother 78    Heart Disease Father     Breast Cancer Maternal Grandmother     Breast Cancer Maternal Aunt      No outpatient medications have been marked as taking for the 10/18/22 encounter (Appointment) with Mario Manzo PA-C.        Bactrim [sulfamethoxazole-trimethoprim], Tramadol, and Codeine  Social History     Tobacco Use   Smoking Status Never   Smokeless Tobacco Never       Social History     Substance and Sexual Activity   Alcohol Use No       REVIEW OF SYSTEMS:  Constitutional: negative  Eyes: negative  Respiratory: negative  Cardiovascular: negative  Gastrointestinal: negative  Genitourinary: negative except for what is in HPI  Musculoskeletal: negative  Skin: negative   Neurological: negative  Hematological/Lymphatic: negative  Psychological: negative    Physical Exam:    There were no vitals filed for this visit. Patient is a 80 y.o. female in no acute distress and alert and oriented to person, place and time. NAD, Alert  Non labored respiration  Normal peripheral pulses  Soft, non tender  Skin-warm and dry  Psych- normal mood and affect    Assessment and Plan   Post operative evaluation  -Patient is s/p cystoscopy bilateral retrograde pyelogram with Dr. Mady Pedroza on 8/23/2022 secondary to asymptomatic microscopic hematuria  Asymptomatic Microscopic Hematuria  -Patient is s/p cystoscopy bilateral retrograde pyelogram with Dr. Mady Pedroza on 8/23/2022 secondary to asymptomatic microscopic hematuria  Recurrent UTI  -denies feelings of UTI. States she has not had one in [place]"  -UA: patient states she cannot give a urine   -PVR: patient refused. Has been taking ditropan 10 mg every other day due to dry mouth. Will switch to ditropan 5 mg daily.  -Educated that Ditropan can cause urinary retention   Pelvic Pain  -continue dietary modifications   Overactive Bladder  -patient taking ditropan, will change to 5 mg daily  -states she had to stop 10 mg because it dried her mouth out. Nocturia  -patient taking ditropan 5 mg daily   Duplicated ureter, left  -Patient is s/p cystoscopy bilateral retrograde pyelogram with Dr. Mady Pedroza on 8/23/2022 secondary to asymptomatic microscopic hematuria    The patient should go to the ED if she develops fever, chills, nausea, vomiting, chest pain, SOB, calf pain, feelings of incomplete emptying, or should they otherwise feel they need evaluated    -Patient has no other questions, comments, or concerns.   -They agree with and understand the plan of care.    -The patient was encouraged to call the office or seek emergency care should this change.         -Follow up with Dr. Jorgito Ritter in 3 months or sooner if needed        [facility]   Urology

## 2022-11-03 ENCOUNTER — HOSPITAL ENCOUNTER (OUTPATIENT)
Dept: PHARMACY | Age: 87
Setting detail: THERAPIES SERIES
Discharge: HOME | End: 2022-11-03
Payer: MEDICARE

## 2022-11-03 DIAGNOSIS — Z51.81 ENCOUNTER FOR THERAPEUTIC DRUG MONITORING: ICD-10-CM

## 2022-11-03 DIAGNOSIS — Z79.01 ANTICOAGULATED ON COUMADIN: ICD-10-CM

## 2022-11-03 DIAGNOSIS — I26.99 PULMONARY EMBOLISM, UNSPECIFIED CHRONICITY, UNSPECIFIED PULMONARY EMBOLISM TYPE, UNSPECIFIED WHETHER ACUTE COR PULMONALE PRESENT (HCC): Primary | ICD-10-CM

## 2022-11-03 LAB — POC INR: 2.9 (ref 0.8–1.2)

## 2022-11-03 PROCEDURE — 85610 PROTHROMBIN TIME: CPT

## 2022-11-03 PROCEDURE — 99211 OFF/OP EST MAY X REQ PHY/QHP: CPT

## 2022-11-03 PROCEDURE — 36416 COLLJ CAPILLARY BLOOD SPEC: CPT

## 2022-11-03 NOTE — PROGRESS NOTES
Medication Management [address] [place] [address]  [phone] (phone)  [phone] (fax)    Ms. Charlie Nuñez is a 80 y.o.  female with history of PE, per referral from BENNIE Antunez, who presents today for Warfarin monitoring and adjustment (3 week visit). Patient verifies current dosing regimen and tablet strength. No missed or extra doses. Patient denies bleeding/chest pain. Has usual SOB/easy bruising. Had more swelling of feet/legs (and soreness), even though wearing compression socks. Then admitted to eating tortilla chips and salsa and corn chips. States has good appetite now. Reminded salt causes fluid retention. Quit milk - feels better and less bladder issues. No blood in urine or stool. No dietary changes. Changes in medication/OTC agents/herbals: she hasn't taken Ditropan lately - hopes to stop completely. Takes Tylenol for chronic back pain. No change in alcohol use or tobacco use. No change in activity level. Patient denies headaches/dizziness/lightheadedness/falls. No vomiting/diarrhea or acute illness. No procedures scheduled in the future at this time. May need a tooth pulled. Assessment:   Lab Results   Component Value Date    INR 2.90 (H) 11/03/2022    INR 2.20 (H) 10/13/2022    INR 2.30 (H) 09/22/2022     INR therapeutic - goal 2-3. Recent Labs     11/03/22  1416   INR 2.90*        Plan:  POCT INR ordered/performed/result reviewed. Continue PO Coumadin 2 mg M, 4 mg TWThFSS. Recheck INR in 4 week(s). Patient reminded to call the Anticoagulation Clinic with any signs or symptoms of bleeding or with any medication changes. Patient given instructions utilizing the teach back method. After visit summary printed and reviewed with patient. Discharged ambulatory in no apparent distress.

## 2022-12-01 ENCOUNTER — HOSPITAL ENCOUNTER (OUTPATIENT)
Dept: PHARMACY | Age: 87
Setting detail: THERAPIES SERIES
Discharge: HOME | End: 2022-12-01
Payer: MEDICARE

## 2022-12-01 DIAGNOSIS — Z79.01 ANTICOAGULATED ON COUMADIN: ICD-10-CM

## 2022-12-01 DIAGNOSIS — Z51.81 ENCOUNTER FOR THERAPEUTIC DRUG MONITORING: ICD-10-CM

## 2022-12-01 DIAGNOSIS — I26.99 PULMONARY EMBOLISM, UNSPECIFIED CHRONICITY, UNSPECIFIED PULMONARY EMBOLISM TYPE, UNSPECIFIED WHETHER ACUTE COR PULMONALE PRESENT (HCC): Primary | ICD-10-CM

## 2022-12-01 LAB — POC INR: 1.5 (ref 0.8–1.2)

## 2022-12-01 PROCEDURE — 85610 PROTHROMBIN TIME: CPT | Performed by: PHARMACIST

## 2022-12-01 PROCEDURE — 99212 OFFICE O/P EST SF 10 MIN: CPT | Performed by: PHARMACIST

## 2022-12-01 PROCEDURE — 36416 COLLJ CAPILLARY BLOOD SPEC: CPT | Performed by: PHARMACIST

## 2022-12-01 NOTE — PROGRESS NOTES
Medication Management [address]  [phone] (phone)  [phone] (fax)    Ms. Nathan Bland is a 80 y.o.  female with history of PE who presents today for anticoagulation monitoring and adjustment. Patient verifies current dosing regimen and tablet strength. No missed or extra doses. Patient denies s/s bleeding/bruising/swelling/chest pain - typical SOB  No blood in urine or stool. Ate some spinach dip on Thanksgiving. No changes in medication/OTC agents/Herbals. No change in alcohol use or tobacco use. More active than usual.   Patient denies headaches/dizziness/lightheadedness/falls. No vomiting/diarrhea or acute illness. No Procedures scheduled in the future at this time. Assessment:   Lab Results   Component Value Date    INR 1.50 (H) 12/01/2022    INR 2.90 (H) 11/03/2022    INR 2.20 (H) 10/13/2022     INR subtherapeutic   Recent Labs     12/01/22  1430   INR 1.50*         Plan:  Coumadin 6mg today and tomorrow, then continue 2mg M and 4mg all other days. Recheck INR in 2 week(s). Patient reminded to call the Anticoagulation Clinic with any signs or symptoms of bleeding or with any medication changes. Patient given instructions utilizing the teach back method. After visit summary printed and reviewed with patient. Discharged ambulatory in no apparent distress.     Roger Mckeon, PharmD, BCPS, CACP, CTTS 12/1/2022 2:46 PM      For Pharmacy Admin Tracking Only    Intervention Detail: Dose Adjustment: 1, reason: Therapy Optimization  Total # of Interventions Recommended: 1  Total # of Interventions Accepted: 1  Time Spent (min): 20

## 2022-12-13 ENCOUNTER — OFFICE VISIT (OUTPATIENT)
Dept: FAMILY MEDICINE CLINIC | Age: 87
End: 2022-12-13
Payer: MEDICARE

## 2022-12-13 VITALS
OXYGEN SATURATION: 93 % | DIASTOLIC BLOOD PRESSURE: 64 MMHG | RESPIRATION RATE: 14 BRPM | BODY MASS INDEX: 26.2 KG/M2 | HEIGHT: 66 IN | TEMPERATURE: 97.7 F | WEIGHT: 163 LBS | HEART RATE: 72 BPM | SYSTOLIC BLOOD PRESSURE: 126 MMHG

## 2022-12-13 DIAGNOSIS — R82.998 LEUKOCYTES IN URINE: ICD-10-CM

## 2022-12-13 DIAGNOSIS — R30.0 DYSURIA: Primary | ICD-10-CM

## 2022-12-13 DIAGNOSIS — R35.0 URINARY FREQUENCY: ICD-10-CM

## 2022-12-13 DIAGNOSIS — R31.9 HEMATURIA, UNSPECIFIED TYPE: ICD-10-CM

## 2022-12-13 DIAGNOSIS — Z79.01 ANTICOAGULATED: ICD-10-CM

## 2022-12-13 LAB
BILIRUBIN URINE: ABNORMAL
BLOOD URINE, POC: ABNORMAL
CHARACTER, URINE: ABNORMAL
COLOR, URINE: YELLOW
GLUCOSE URINE: NEGATIVE MG/DL
KETONES, URINE: NEGATIVE
LEUKOCYTE CLUMPS, URINE: ABNORMAL
NITRITE, URINE: POSITIVE
PH, URINE: 6 (ref 5–9)
PROTEIN, URINE: 100 MG/DL
SPECIFIC GRAVITY, URINE: >= 1.03 (ref 1–1.03)
UROBILINOGEN, URINE: 1 EU/DL (ref 0–1)

## 2022-12-13 PROCEDURE — 99214 OFFICE O/P EST MOD 30 MIN: CPT | Performed by: NURSE PRACTITIONER

## 2022-12-13 PROCEDURE — 1123F ACP DISCUSS/DSCN MKR DOCD: CPT | Performed by: NURSE PRACTITIONER

## 2022-12-13 RX ORDER — PHENAZOPYRIDINE HYDROCHLORIDE 100 MG/1
100 TABLET, FILM COATED ORAL 3 TIMES DAILY PRN
Qty: 6 TABLET | Refills: 0 | Status: SHIPPED | OUTPATIENT
Start: 2022-12-13 | End: 2022-12-15

## 2022-12-13 RX ORDER — SULFAMETHOXAZOLE AND TRIMETHOPRIM 800; 160 MG/1; MG/1
1 TABLET ORAL 2 TIMES DAILY
Qty: 20 TABLET | Refills: 0 | Status: SHIPPED | OUTPATIENT
Start: 2022-12-13 | End: 2022-12-23

## 2022-12-13 ASSESSMENT — PATIENT HEALTH QUESTIONNAIRE - PHQ9
SUM OF ALL RESPONSES TO PHQ QUESTIONS 1-9: 0
SUM OF ALL RESPONSES TO PHQ9 QUESTIONS 1 & 2: 0
SUM OF ALL RESPONSES TO PHQ QUESTIONS 1-9: 0
2. FEELING DOWN, DEPRESSED OR HOPELESS: 0
1. LITTLE INTEREST OR PLEASURE IN DOING THINGS: 0

## 2022-12-13 NOTE — PROGRESS NOTES
Leukocytes in urine  -     Culture, Urine    Hematuria, unspecified type  Urology  Also lkely due to UTI exam not consistent with pyelonephritis or ureteral stone   Anticoagulated  Will contact [facility] and let them know pt on bactrim ds so they can adjust coumadin as needed. Other orders  -     POCT Urinalysis No Micro (Auto)  -     sulfamethoxazole-trimethoprim (BACTRIM DS;SEPTRA DS) 800-160 MG per tablet; Take 1 tablet by mouth 2 times daily for 10 days  Pt in agreement with plan  Er precautions given     Return if symptoms worsen or fail to improve.      -Start above treatments  -Urine culture was sent today  -Patient advised to contact our office immediately if symptoms worsen or persist  -Patient counseled on conservative care including fluids, rest and OTC meds      All patient questions answered. Patient voiced understanding.

## 2022-12-15 ENCOUNTER — TELEPHONE (OUTPATIENT)
Dept: FAMILY MEDICINE CLINIC | Age: 87
End: 2022-12-15

## 2022-12-15 ENCOUNTER — HOSPITAL ENCOUNTER (OUTPATIENT)
Dept: PHARMACY | Age: 87
Setting detail: THERAPIES SERIES
Discharge: HOME | End: 2022-12-15
Payer: MEDICARE

## 2022-12-15 DIAGNOSIS — Z51.81 ENCOUNTER FOR THERAPEUTIC DRUG MONITORING: ICD-10-CM

## 2022-12-15 DIAGNOSIS — Z79.01 ANTICOAGULATED ON COUMADIN: ICD-10-CM

## 2022-12-15 DIAGNOSIS — I26.99 PULMONARY EMBOLISM, UNSPECIFIED CHRONICITY, UNSPECIFIED PULMONARY EMBOLISM TYPE, UNSPECIFIED WHETHER ACUTE COR PULMONALE PRESENT (HCC): Primary | ICD-10-CM

## 2022-12-15 LAB
ORGANISM: ABNORMAL
POC INR: 2.6 (ref 0.8–1.2)
URINE CULTURE, ROUTINE: ABNORMAL

## 2022-12-15 PROCEDURE — 99211 OFF/OP EST MAY X REQ PHY/QHP: CPT | Performed by: PHARMACIST

## 2022-12-15 PROCEDURE — 85610 PROTHROMBIN TIME: CPT | Performed by: PHARMACIST

## 2022-12-15 PROCEDURE — 36416 COLLJ CAPILLARY BLOOD SPEC: CPT | Performed by: PHARMACIST

## 2022-12-15 NOTE — TELEPHONE ENCOUNTER
----- Message from LISA Fisher CNP sent at 12/15/2022  3:51 PM EST -----  Let pt know her urine is growing an e coli infection, she is on appropriate atb, ask how she is  feeling.

## 2022-12-15 NOTE — PROGRESS NOTES
Medication Management [address]  [phone] (phone)  [phone] (fax)    Ms. Alisha Mueller is a 80 y.o.  female with history of PE who presents today for anticoagulation monitoring and adjustment. Patient verifies current dosing regimen and tablet strength. No missed or extra doses. Patient denies s/s bleeding/bruising/swelling/SOB/chest pain  No blood in urine or stool. Appetite low. No changes in OTC agents/Herbals. On Bactrim DS 1 tab BID x 10 days, started 12/13/22, also taking Pyridium. No change in alcohol use or tobacco use. No change in activity level. Patient denies headaches/dizziness/lightheadedness/falls. No vomiting/diarrhea. Has UTI. No Procedures scheduled in the future at this time. Assessment:   Lab Results   Component Value Date    INR 2.60 (H) 12/15/2022    INR 1.50 (H) 12/01/2022    INR 2.90 (H) 11/03/2022     INR therapeutic   Recent Labs     12/15/22  1408   INR 2.60*         Plan:  Coumadin 4mg today, 2mg tomorrow 4mg Sat, 2mg Sun and Mon, 4mg Tues. Reduced doses due to anticipated Bactrim drug interaction. Recheck INR in 5 day(s). Patient reminded to call the Anticoagulation Clinic with any signs or symptoms of bleeding or with any medication changes. Patient given instructions utilizing the teach back method. After visit summary printed and reviewed with patient. Discharged ambulatory in no apparent distress.       For Pharmacy Admin Tracking Only    Intervention Detail: Dose Adjustment: 1, reason: Therapy De-escalation  Total # of Interventions Recommended: 1  Total # of Interventions Accepted: 1  Time Spent (min): 20

## 2022-12-16 NOTE — TELEPHONE ENCOUNTER
Patient informed and verbalized understanding. Patient states she is feeling much better, just a little pressure now.  No questions at this time

## 2022-12-20 ENCOUNTER — HOSPITAL ENCOUNTER (OUTPATIENT)
Dept: PHARMACY | Age: 87
Setting detail: THERAPIES SERIES
Discharge: HOME | End: 2022-12-20
Payer: MEDICARE

## 2022-12-20 DIAGNOSIS — Z51.81 ENCOUNTER FOR THERAPEUTIC DRUG MONITORING: ICD-10-CM

## 2022-12-20 DIAGNOSIS — I26.99 PULMONARY EMBOLISM, UNSPECIFIED CHRONICITY, UNSPECIFIED PULMONARY EMBOLISM TYPE, UNSPECIFIED WHETHER ACUTE COR PULMONALE PRESENT (HCC): Primary | ICD-10-CM

## 2022-12-20 DIAGNOSIS — Z79.01 ANTICOAGULATED ON COUMADIN: ICD-10-CM

## 2022-12-20 LAB — POC INR: 3.6 (ref 0.8–1.2)

## 2022-12-20 PROCEDURE — 36416 COLLJ CAPILLARY BLOOD SPEC: CPT | Performed by: PHARMACIST

## 2022-12-20 PROCEDURE — 85610 PROTHROMBIN TIME: CPT | Performed by: PHARMACIST

## 2022-12-20 PROCEDURE — 99212 OFFICE O/P EST SF 10 MIN: CPT | Performed by: PHARMACIST

## 2022-12-20 NOTE — PROGRESS NOTES
Medication Management [address]  [phone] (phone)  [phone] (fax)    Ms. Michael Trujillo is a 80 y.o.  female with history of PE who presents today for anticoagulation monitoring and adjustment. Patient verifies current dosing regimen and tablet strength. No missed or extra doses. Patient denies s/s bleeding/bruising/swelling/chest pain. Baseline SOB. No blood in urine or stool. Dietary changes. Feels she is gaining more of an appetite. No changes in medication/OTC agents/Herbals. Currently on Bactrim, has three days left  No change in alcohol use or tobacco use. No change in activity level. Patient denies headaches/dizziness/lightheadedness/falls. No vomiting/diarrhea or acute illness. No Procedures scheduled in the future at this time. Assessment:   Lab Results   Component Value Date    INR 3.60 (H) 12/20/2022    INR 2.60 (H) 12/15/2022    INR 1.50 (H) 12/01/2022     INR supratherapeutic   Recent Labs     12/20/22  1510   INR 3.60*       Patient interview completed and discussed with pharmacist by J CARLOS Donis PharmD Candidate [id number]      Plan:  Decrease Coumadin to 2mg daily x 3, and then return to 2mg Mon and 4mg all other days. Recheck INR in 2 week(s). Patient reminded to call the Anticoagulation Clinic with any signs or symptoms of bleeding or with any medication changes. Patient given instructions utilizing the teach back method. After visit summary printed and reviewed with patient. Discharged ambulatory in no apparent distress.     For Pharmacy Admin Tracking Only    Intervention Detail: Dose Adjustment: 1, reason: Therapy De-escalation  Total # of Interventions Recommended: 1  Total # of Interventions Accepted: 1  Time Spent (min): 20

## 2023-01-03 ENCOUNTER — HOSPITAL ENCOUNTER (OUTPATIENT)
Dept: PHARMACY | Age: 88
Setting detail: THERAPIES SERIES
Discharge: HOME | End: 2023-01-03
Payer: MEDICARE

## 2023-01-03 DIAGNOSIS — Z79.01 ANTICOAGULATED ON COUMADIN: ICD-10-CM

## 2023-01-03 DIAGNOSIS — I26.99 PULMONARY EMBOLISM, UNSPECIFIED CHRONICITY, UNSPECIFIED PULMONARY EMBOLISM TYPE, UNSPECIFIED WHETHER ACUTE COR PULMONALE PRESENT (HCC): Primary | ICD-10-CM

## 2023-01-03 DIAGNOSIS — Z51.81 ENCOUNTER FOR THERAPEUTIC DRUG MONITORING: ICD-10-CM

## 2023-01-03 LAB — POC INR: 2.8 (ref 0.8–1.2)

## 2023-01-03 PROCEDURE — 36416 COLLJ CAPILLARY BLOOD SPEC: CPT

## 2023-01-03 PROCEDURE — 99211 OFF/OP EST MAY X REQ PHY/QHP: CPT

## 2023-01-03 PROCEDURE — 85610 PROTHROMBIN TIME: CPT

## 2023-01-03 NOTE — PROGRESS NOTES
Medication Management [address]  [phone] (phone)  [phone] (fax)    Ms. David Fraser is a 80 y.o.  female with history of PE who presents today for anticoagulation monitoring and adjustment. Patient verifies current dosing regimen and tablet strength.-follows printed calendar   No missed or extra doses. Patient denies s/s bleeding/bruising/swelling/SOB/chest pain  No blood in urine or stool. No dietary changes. No changes in medication/OTC agents/Herbals. No change in alcohol use or tobacco use. No change in activity level. Patient denies headaches/dizziness/lightheadedness/falls. No vomiting/diarrhea or acute illness. No Procedures scheduled in the future at this time. Assessment:   Lab Results   Component Value Date    INR 2.80 (H) 01/03/2023    INR 3.60 (H) 12/20/2022    INR 2.60 (H) 12/15/2022     INR therapeutic   Recent Labs     01/03/23  1508   INR 2.80*         Plan:  Continue Coumadin 2 mg M, 4 mg TWThFSS. Recheck INR in 3 week(s). Patient reminded to call the Anticoagulation Clinic with any signs or symptoms of bleeding or with any medication changes. Patient given instructions utilizing the teach back method. After visit summary printed and reviewed with patient. Discharged ambulatory in no apparent distress.       For Pharmacy Admin Tracking Only    Time Spent (min): [facility], [place], BCPS  1/3/2023  4:20 PM

## 2023-01-19 ENCOUNTER — OFFICE VISIT (OUTPATIENT)
Dept: UROLOGY | Age: 88
End: 2023-01-19
Payer: MEDICARE

## 2023-01-19 VITALS — BODY MASS INDEX: 26.03 KG/M2 | WEIGHT: 162 LBS | RESPIRATION RATE: 18 BRPM | HEIGHT: 66 IN

## 2023-01-19 DIAGNOSIS — N32.81 OVERACTIVE BLADDER: ICD-10-CM

## 2023-01-19 DIAGNOSIS — R10.2 PELVIC PAIN: ICD-10-CM

## 2023-01-19 DIAGNOSIS — R31.21 ASYMPTOMATIC MICROSCOPIC HEMATURIA: ICD-10-CM

## 2023-01-19 DIAGNOSIS — Q62.5 DUPLICATED URETER, LEFT: Primary | ICD-10-CM

## 2023-01-19 DIAGNOSIS — N39.0 RECURRENT UTI: ICD-10-CM

## 2023-01-19 PROCEDURE — 1123F ACP DISCUSS/DSCN MKR DOCD: CPT | Performed by: UROLOGY

## 2023-01-19 PROCEDURE — 99214 OFFICE O/P EST MOD 30 MIN: CPT | Performed by: UROLOGY

## 2023-01-19 NOTE — PROGRESS NOTES
Patient:  Emma Mesa  YOB: 1934  Date: 1/19/2023    HISTORY OF PRESENT ILLNESS:   The patient is a 80 y.o. female who presents today for evaluation of the following problems: asymptomatic microscopic hematuria, duplicated left ureteral system complete, post operative evaluation       Patient is s/p cystoscopy bilateral retrograde pyelogram with Dr. Miguel Hairston on 8/23/2022 secondary to asymptomatic microscopic hematuria  -She was discharged and instructed to follow up in 2 to 3 months with ANITHA after trial of daily ditropan (for sx of nocturia)   -Also follows in the office for recurrent UTI, pelvic pain, overactive bladder, nocturia, and a duplicated left ureter     -Patient had a cystoscopy with hydro-distension, bilateral retrograde pyelogram, left ureteroscopy with balloon dilation of the distal ureter/ureterocele and placement of a left ureteral stent in 2015 by Dr. Hema Mackey secondary to Bladder pain and left duplicated system with evidence  of left ureterocele. This occurred on 08/8/2015.     1/19/23     Overall the problem(s) : are improving. Associated Symptoms: No dysuria, gross hematuria. States she notes worsening of symptoms with dietary changes   Has been taking ditropan 10mg every other day due to side effects, would like to try 5 mg daily. Feels like she is emptying her bladder    1 UTI 2 months ago, first one in 6 years  Currently doing well; this has resolved          Imaging Reviewed during this Office Visit:   (results were independently reviewed by physician and radiology report verified)  I independently reviewed and verified the images and reports from:    No results found. Urinalysis today:  No results found for this visit on 01/19/23.     Last BUN and creatinine:  Lab Results   Component Value Date    BUN 14 08/09/2022     Lab Results   Component Value Date    CREATININE 0.7 08/09/2022       PAST MEDICAL, FAMILY AND SOCIAL HISTORY UPDATE:  Past Medical History: Diagnosis Date    Arthritis     wrist, back, neck, foot    Blood circulation, collateral     Breathing difficulty     Chronic kidney disease     DVT (deep venous thrombosis) ([facility] [address]     History of blood transfusion 1960's    child birth    Hx of blood clots 2013? ??    left leg    Hyperlipidemia     Hypertension     Pneumonia     Prolonged emergence from general anesthesia     with hysterectomy? ?    Thyroid disease     Unspecified diseases of blood and blood-forming organs      Past Surgical History:   Procedure Laterality Date    CATARACT REMOVAL Right 12/03/2020    CATARACT REMOVAL Left 12/09/2020    COLONOSCOPY      last one before 2005???    CYSTOSCOPY  08/18/2015    HYDRODISTENTION, RPG, LEFT STENT    CYSTOSCOPY N/A 8/23/2022    CYSTOSCOPY, BILATERAL RETROGRADE PYELOGRAM performed by Nguyen Segura MD at [facility] ([address] [place] [address])  1983??    [facility]?? VENA CAVA FILTER PLACEMENT  01/01/2013     Family History   Problem Relation Age of Onset    Cancer Mother     Breast Cancer Mother 78    Heart Disease Father     Breast Cancer Maternal Grandmother     Breast Cancer Maternal Aunt      Outpatient Medications Marked as Taking for the 1/19/23 encounter (Office Visit) with Nguyen Segura MD   Medication Sig Dispense Refill    oxybutynin (DITROPAN XL) 5 MG extended release tablet Take 1 tablet by mouth daily 30 tablet 0    warfarin (JANTOVEN) 2 MG tablet use as directed by [facility] coumadin clinic 180 tabs = 90 days 180 tablet 1    Handicap Placard MISC by Does not apply route Duration: 3 years  Physical Deconditioning 1 each 0    atorvastatin (LIPITOR) 20 MG tablet Take 1 tablet by mouth every evening Indications: Blood Cholesterol Abnormal 90 tablet 4    levothyroxine (SYNTHROID) 125 MCG tablet Take 1 tablet by mouth Daily Indications: Impaired Thyroid Function Pt wants only synthroid brand name  Takes [facility] only.  5-14-18. 90 tablet 4    dilTIAZem (DILACOR XR) 180 MG extended release capsule Take 2 capsules by mouth daily Indications: High Blood Pressure Disorder 180 capsule 4    Polyethyl Glycol-Propyl Glycol (SYSTANE ULTRA OP) Apply 1 drop to eye 2 times daily      acetaminophen (TYLENOL) 500 MG tablet Take 500 mg by mouth every 6 hours as needed for Pain or Fever Don't take more than 3,000 mg each day, including what's in Norco.      SF 5000 PLUS 1.1 % CREA Place onto teeth 2 times daily          Bactrim [sulfamethoxazole-trimethoprim], Milk-related compounds, Tramadol, and Codeine  Social History     Tobacco Use   Smoking Status Never   Smokeless Tobacco Never       Social History     Substance and Sexual Activity   Alcohol Use No       REVIEW OF SYSTEMS:  Constitutional: negative  Eyes: negative  Respiratory: negative  Cardiovascular: negative  Gastrointestinal: negative  Genitourinary: negative except for what is in HPI  Musculoskeletal: negative  Skin: negative   Neurological: negative  Hematological/Lymphatic: negative  Psychological: negative    Physical Exam:      Vitals:    01/19/23 1417   Resp: 18     Patient is a 80 y.o. female in no acute distress and alert and oriented to person, place and time. NAD, Alert  Non labored respiration  Normal peripheral pulses  Soft, non tender  Skin-warm and dry  Psych- normal mood and affect    Assessment and Plan   Post operative evaluation  -Patient is s/p cystoscopy bilateral retrograde pyelogram with Dr. Berry Josue on 8/23/2022 secondary to asymptomatic microscopic hematuria  Asymptomatic Microscopic Hematuria  -Patient is s/p cystoscopy bilateral retrograde pyelogram with Dr. Berry Josue on 8/23/2022 secondary to asymptomatic microscopic hematuria  Recurrent UTI  Recurrent UTIs: Discussed double voiding techniques to improve bladder emptying. Discussed staying well hydrated, >60 oz/day. Cannot take Cranberry pills; will try D-mannnose. Discussed good blood sugar control.    Pelvic Pain  -continue dietary modifications   Overactive Bladder  -patient taking ditropan, will change to 5 mg daily PRN  -states she had to stop 10 mg because it dried her mouth out.    Nocturia  -patient taking ditropan 5 mg daily PRN  Duplicated ureter, left  -Patient is s/p cystoscopy bilateral retrograde pyelogram with Dr. Michelle Barraza on 8/23/2022 secondary to asymptomatic microscopic hematuria    Follow up 6-12 months

## 2023-01-24 ENCOUNTER — APPOINTMENT (OUTPATIENT)
Dept: PHARMACY | Age: 88
End: 2023-01-24
Payer: MEDICARE

## 2023-01-31 ENCOUNTER — HOSPITAL ENCOUNTER (OUTPATIENT)
Dept: PHARMACY | Age: 88
Setting detail: THERAPIES SERIES
Discharge: HOME | End: 2023-01-31
Payer: MEDICARE

## 2023-01-31 DIAGNOSIS — Z79.01 ANTICOAGULATED ON COUMADIN: ICD-10-CM

## 2023-01-31 DIAGNOSIS — Z51.81 ENCOUNTER FOR THERAPEUTIC DRUG MONITORING: ICD-10-CM

## 2023-01-31 DIAGNOSIS — I26.99 PULMONARY EMBOLISM, UNSPECIFIED CHRONICITY, UNSPECIFIED PULMONARY EMBOLISM TYPE, UNSPECIFIED WHETHER ACUTE COR PULMONALE PRESENT (HCC): Primary | ICD-10-CM

## 2023-01-31 LAB — POC INR: 4 (ref 0.8–1.2)

## 2023-01-31 PROCEDURE — 99212 OFFICE O/P EST SF 10 MIN: CPT | Performed by: PHARMACIST

## 2023-01-31 PROCEDURE — 85610 PROTHROMBIN TIME: CPT | Performed by: PHARMACIST

## 2023-01-31 PROCEDURE — 36416 COLLJ CAPILLARY BLOOD SPEC: CPT | Performed by: PHARMACIST

## 2023-01-31 NOTE — PROGRESS NOTES
Medication Management [address]  [phone] (phone)  [phone] (fax)    Ms. Martha Shin is a 80 y.o.  female with history of PE who presents today for anticoagulation monitoring and adjustment. Patient verifies current dosing regimen and tablet strength. No missed or extra doses. Patient denies s/s bleeding/bruising/swelling/chest pain SOB, typical for her  No blood in urine or stool. Less Vit K foods when she was out of town. No changes in medication/OTC agents/Herbals. No change in alcohol use or tobacco use. Less active last week, was out of town. Patient denies headaches/dizziness/lightheadedness/falls. No vomiting/diarrhea or acute illness. No Procedures scheduled in the future at this time. Tooth extraction 2/17, [facility] dentist did not give her instructions. Will plan to continue Coumadin, discussed with patient. Assessment:   Lab Results   Component Value Date    INR 4.00 (H) 01/31/2023    INR 2.80 (H) 01/03/2023    INR 3.60 (H) 12/20/2022     INR supratherapeutic   Recent Labs     01/31/23  1408   INR 4.00*         Plan:  Hold today, then continue Coumadin 2mg M and 4mg all other days. Recheck INR in 9 day(s). Patient reminded to call the Anticoagulation Clinic with any signs or symptoms of bleeding or with any medication changes. Patient given instructions utilizing the teach back method. After visit summary printed and reviewed with patient. Discharged ambulatory in no apparent distress.     For Pharmacy Admin Tracking Only    Intervention Detail: Dose Adjustment: 1, reason: Therapy De-escalation  Total # of Interventions Recommended: 1  Total # of Interventions Accepted: 1  Time Spent (min): 20

## 2023-02-09 ENCOUNTER — HOSPITAL ENCOUNTER (OUTPATIENT)
Dept: PHARMACY | Age: 88
Setting detail: THERAPIES SERIES
Discharge: HOME | End: 2023-02-09
Payer: MEDICARE

## 2023-02-09 DIAGNOSIS — Z51.81 ENCOUNTER FOR THERAPEUTIC DRUG MONITORING: ICD-10-CM

## 2023-02-09 DIAGNOSIS — Z79.01 ANTICOAGULATED ON COUMADIN: Primary | ICD-10-CM

## 2023-02-09 DIAGNOSIS — I26.99 PULMONARY EMBOLISM, UNSPECIFIED CHRONICITY, UNSPECIFIED PULMONARY EMBOLISM TYPE, UNSPECIFIED WHETHER ACUTE COR PULMONALE PRESENT (HCC): ICD-10-CM

## 2023-02-09 LAB — POC INR: 3.7 (ref 0.8–1.2)

## 2023-02-09 PROCEDURE — 99212 OFFICE O/P EST SF 10 MIN: CPT | Performed by: PHARMACIST

## 2023-02-09 PROCEDURE — 85610 PROTHROMBIN TIME: CPT | Performed by: PHARMACIST

## 2023-02-09 PROCEDURE — 36416 COLLJ CAPILLARY BLOOD SPEC: CPT | Performed by: PHARMACIST

## 2023-02-09 NOTE — PROGRESS NOTES
Medication Management [address]  [phone] (phone)  [phone] (fax)    Ms. Aziza Beavers is a 80 y.o.  female with history of PE who presents today for anticoagulation monitoring and adjustment. Patient verifies current dosing regimen and tablet strength. No missed or extra doses. Patient denies s/s bleeding/bruising/swelling/SOB/chest pain  No blood in urine or stool. No dietary changes. Usually no greens. No changes in medication/OTC agents/Herbals. - Daughter has bought a product for UTI's at [employer], not cranberry product per patient. Informed patient to give us a call when she is home so we can add the product. Has been taking past 3 days. Also asked patient to bring product to the next visit. No change in alcohol use or tobacco use. Less energy than usual  Baseline dizzy, no falls,no headache. No vomiting/diarrhea or acute illness. Procedures scheduled in the future at this time. 2/17 tooth extraction, 3 teeth      Assessment:     Lab Results   Component Value Date    INR 3.70 (H) 02/09/2023    INR 4.00 (H) 01/31/2023    INR 2.80 (H) 01/03/2023     INR supratherapeutic   Recent Labs     02/09/23  1404   INR 3.70*         Patient and INR discussed with pharmacist.  Saintclair Bruins PharmD Candidate 2023    Plan:  Hold today, then decrease Coumadin 2mg MF and 4mg TWThSaS. Recheck INR in 1 week(s). Patient reminded to call the Anticoagulation Clinic with signs or symptoms of bleeding or with any medication changes. Patient given instructions utilizing the teach back method. After visit summary printed and reviewed with patient. Discharged ambulatory in no apparent distress.       For Pharmacy Admin Tracking Only    Intervention Detail: Dose Adjustment: 1, reason: Therapy De-escalation  Total # of Interventions Recommended: 1  Total # of Interventions Accepted: 1  Time Spent (min): 20

## 2023-02-16 ENCOUNTER — HOSPITAL ENCOUNTER (OUTPATIENT)
Dept: PHARMACY | Age: 88
Setting detail: THERAPIES SERIES
Discharge: HOME | End: 2023-02-16
Payer: MEDICARE

## 2023-02-16 DIAGNOSIS — Z51.81 ENCOUNTER FOR THERAPEUTIC DRUG MONITORING: ICD-10-CM

## 2023-02-16 DIAGNOSIS — I26.99 PULMONARY EMBOLISM, UNSPECIFIED CHRONICITY, UNSPECIFIED PULMONARY EMBOLISM TYPE, UNSPECIFIED WHETHER ACUTE COR PULMONALE PRESENT (HCC): Primary | ICD-10-CM

## 2023-02-16 DIAGNOSIS — Z79.01 ANTICOAGULATED ON COUMADIN: ICD-10-CM

## 2023-02-16 LAB — POC INR: 3.1 (ref 0.8–1.2)

## 2023-02-16 PROCEDURE — 99211 OFF/OP EST MAY X REQ PHY/QHP: CPT

## 2023-02-16 PROCEDURE — 36416 COLLJ CAPILLARY BLOOD SPEC: CPT

## 2023-02-16 PROCEDURE — 85610 PROTHROMBIN TIME: CPT

## 2023-02-16 NOTE — PROGRESS NOTES
Medication Management [address]   [phone] (phone)  [phone] (fax)    Ms. Fabiola Barksdale is a 80 y.o.  female with history of PE who presents today for anticoagulation monitoring and adjustment. Patient verifies current dosing regimen and tablet strength. Follows chart   No missed or extra doses. Patient denies s/s bleeding/bruising/swelling/SOB/chest pain  No blood in urine or stool. Reports appetite back to baseline since last appt, more energy as well. Brought in d-mannose, hasn't taken since last appointment and would like to restart. No change in alcohol use or tobacco use. Patient denies headaches/dizziness/lightheadedness/falls. No vomiting/diarrhea or acute illness. 2/21 Tooth extraction    Assessment:   Lab Results   Component Value Date    INR 3.10 (H) 02/16/2023    INR 3.70 (H) 02/09/2023    INR 4.00 (H) 01/31/2023     INR supratherapeutic   Recent Labs     02/16/23  1346   INR 3.10*     INR goal 2.0-3.0    Reviewed and discussed patient and INR with pharmacist.   Mekhi Starr, PharmD Candidate 2023    Plan:  Coumadin 2 mg x 1 then continue Coumadin 2 mg MF, 4 mg TWThSS. Recheck INR in 1.5 week(s). Patient reminded to call the Anticoagulation Clinic with any signs or symptoms of bleeding or with any medication changes. Patient given instructions utilizing the teach back method. After visit summary printed and reviewed with patient. Discharged ambulatory in no apparent distress.     For Pharmacy Admin Tracking Only    Intervention Detail: Dose Adjustment: 1, reason: Therapy De-escalation  Total # of Interventions Recommended: 1  Total # of Interventions Accepted: 1  Time Spent (min): [address], PharmD, BCPS  2/16/2023  2:25 PM

## 2023-02-27 ENCOUNTER — HOSPITAL ENCOUNTER (OUTPATIENT)
Dept: PHARMACY | Age: 88
Setting detail: THERAPIES SERIES
Discharge: HOME | End: 2023-02-27
Payer: MEDICARE

## 2023-02-27 DIAGNOSIS — I26.99 PULMONARY EMBOLISM, UNSPECIFIED CHRONICITY, UNSPECIFIED PULMONARY EMBOLISM TYPE, UNSPECIFIED WHETHER ACUTE COR PULMONALE PRESENT (HCC): Primary | ICD-10-CM

## 2023-02-27 DIAGNOSIS — Z79.01 ANTICOAGULATED ON COUMADIN: ICD-10-CM

## 2023-02-27 DIAGNOSIS — Z51.81 ENCOUNTER FOR THERAPEUTIC DRUG MONITORING: ICD-10-CM

## 2023-02-27 DIAGNOSIS — Z95.828 PRESENCE OF IVC FILTER: ICD-10-CM

## 2023-02-27 LAB — POC INR: 1.7 (ref 0.8–1.2)

## 2023-02-27 PROCEDURE — 99212 OFFICE O/P EST SF 10 MIN: CPT

## 2023-02-27 PROCEDURE — 85610 PROTHROMBIN TIME: CPT

## 2023-02-27 PROCEDURE — 36416 COLLJ CAPILLARY BLOOD SPEC: CPT

## 2023-02-27 RX ORDER — WARFARIN SODIUM 2 MG/1
TABLET ORAL
Qty: 180 TABLET | Refills: 1 | Status: SHIPPED | OUTPATIENT
Start: 2023-02-27

## 2023-02-27 RX ORDER — WARFARIN SODIUM 2 MG/1
TABLET ORAL EVERY EVENING
COMMUNITY

## 2023-02-27 NOTE — PROGRESS NOTES
Medication Management [address]  [phone] (phone)  [phone] (fax)    Ms. Kaley Botello is a 80 y.o.  female with history of PE, per referral from BENNIE Babcock, who presents today for Warfarin monitoring and adjustment (1.5 week visit). Patient verifies tablet strength. Followed printed instructions for dose. No missed or extra doses. Patient denies bruising/swelling/chest pain. Has usual SOB. Wears compression hose bilat. Had about 14 hours of bleeding from a tooth extraction 2/20; states dentist really had to work to remove it. No antibiotics. Taking Tylenol for pain. No blood in urine or stool. No dietary changes. Changes in medication/OTC agents/herbals: did resume D-Mannose as discussed at last visit. No change in alcohol use or tobacco use. No change in activity level. Patient denies headaches/dizziness/lightheadedness/falls. No vomiting/diarrhea or acute illness. No procedures scheduled in the future at this time. Assessment:   Lab Results   Component Value Date    INR 1.70 (H) 02/27/2023    INR 3.10 (H) 02/16/2023    INR 3.70 (H) 02/09/2023     INR subtherapeutic - goal 2-3. Recent Labs     02/27/23  1311   INR 1.70*        Plan:  POCT INR performed/result reviewed. 4 mg today, M, then continue PO Coumadin 2 mg MF, 4 mg TWThSS. Recheck INR in 1.5-2 week(s). (Report given - orders entered by DANK Gonzalez, PharmD., who electronically sent prescription under new medical director.)  Patient reminded to call the Anticoagulation Clinic with any signs or symptoms of bleeding or with any medication changes. Patient given instructions utilizing the teach back method. After visit summary printed and reviewed with patient. Discharged ambulatory in no apparent distress.     For Pharmacy Admin Tracking Only    Intervention Detail: Dose Adjustment: 1, reason: Therapy Optimization and Refill(s) Provided  Total # of Interventions Recommended: 2  Total # of Interventions Accepted: 2  Time Spent (min): 25

## 2023-03-09 ENCOUNTER — HOSPITAL ENCOUNTER (OUTPATIENT)
Dept: PHARMACY | Age: 88
Setting detail: THERAPIES SERIES
Discharge: HOME | End: 2023-03-09
Payer: MEDICARE

## 2023-03-09 DIAGNOSIS — Z79.01 ANTICOAGULATED ON COUMADIN: Primary | ICD-10-CM

## 2023-03-09 DIAGNOSIS — Z51.81 ENCOUNTER FOR THERAPEUTIC DRUG MONITORING: ICD-10-CM

## 2023-03-09 DIAGNOSIS — I26.99 PULMONARY EMBOLISM, UNSPECIFIED CHRONICITY, UNSPECIFIED PULMONARY EMBOLISM TYPE, UNSPECIFIED WHETHER ACUTE COR PULMONALE PRESENT (HCC): ICD-10-CM

## 2023-03-09 LAB — POC INR: 2.1 (ref 0.8–1.2)

## 2023-03-09 PROCEDURE — 99211 OFF/OP EST MAY X REQ PHY/QHP: CPT

## 2023-03-09 PROCEDURE — 36416 COLLJ CAPILLARY BLOOD SPEC: CPT

## 2023-03-09 PROCEDURE — 85610 PROTHROMBIN TIME: CPT

## 2023-03-09 NOTE — PROGRESS NOTES
Medication Management [address]  [phone] (phone)  [phone] (fax)    Ms. Arian Cárdenas is a 80 y.o.  female with history of PE who presents today for anticoagulation monitoring and adjustment. Patient verifies current dosing regimen and tablet strength. No missed or extra doses. Patient denies s/s bleeding/bruising/swelling/SOB/chest pain   No blood in urine or stool. No dietary changes. No changes in medication/OTC agents/Herbals. No change in alcohol use or tobacco use. No change in activity level. Patient denies headaches/dizziness/lightheadedness/falls. No vomiting/diarrhea or acute illness. No Procedures scheduled in the future at this time. Assessment:   Lab Results   Component Value Date    INR 2.10 (H) 03/09/2023    INR 1.70 (H) 02/27/2023    INR 3.10 (H) 02/16/2023     INR therapeutic   Recent Labs     03/09/23  1411   INR 2.10*    INR Goal 2.0 - 3.0   Patient interview completed and discussed with pharmacist by Roger Oates 2023      Plan:  Continue Coumadin 2 mg MoFr and 4 mg SuTuWeThSa. Recheck INR in 2 week(s). Patient reminded to call the Anticoagulation Clinic with any signs or symptoms of bleeding or with any medication changes. Patient given instructions utilizing the teach back method. After visit summary printed and reviewed with patient. Discharged ambulatory in no apparent distress.     For Pharmacy Admin Tracking Only  Time Spent (min): 15

## 2023-03-23 ENCOUNTER — APPOINTMENT (OUTPATIENT)
Dept: PHARMACY | Age: 88
End: 2023-03-23
Payer: MEDICARE

## 2023-03-27 ENCOUNTER — HOSPITAL ENCOUNTER (OUTPATIENT)
Dept: PHARMACY | Age: 88
Setting detail: THERAPIES SERIES
Discharge: HOME | End: 2023-03-27
Payer: MEDICARE

## 2023-03-27 DIAGNOSIS — Z79.01 ANTICOAGULATED ON COUMADIN: ICD-10-CM

## 2023-03-27 DIAGNOSIS — I26.99 PULMONARY EMBOLISM, UNSPECIFIED CHRONICITY, UNSPECIFIED PULMONARY EMBOLISM TYPE, UNSPECIFIED WHETHER ACUTE COR PULMONALE PRESENT (HCC): Primary | ICD-10-CM

## 2023-03-27 DIAGNOSIS — Z51.81 ENCOUNTER FOR THERAPEUTIC DRUG MONITORING: ICD-10-CM

## 2023-03-27 LAB — POC INR: 1.9 (ref 0.8–1.2)

## 2023-03-27 PROCEDURE — 36416 COLLJ CAPILLARY BLOOD SPEC: CPT

## 2023-03-27 PROCEDURE — 85610 PROTHROMBIN TIME: CPT

## 2023-03-27 PROCEDURE — 99211 OFF/OP EST MAY X REQ PHY/QHP: CPT

## 2023-03-27 NOTE — PROGRESS NOTES
Medication Management [address]  [phone] (phone)  [phone] (fax)    Ms. Farhad Duran is a 80 y.o.  female with history of PE who presents today for anticoagulation monitoring and adjustment. Patient verifies current tablet strength. Patient states she follows the calendar provided at each visit. No missed or extra doses. Patient denies s/s bleeding/bruising/swelling/SOB/chest pain  No blood in urine or stool. No dietary changes. No changes in medication/OTC agents/Herbals. No change in alcohol use or tobacco use. No change in activity level. Patient denies headaches/dizziness/lightheadedness/falls. No vomiting/diarrhea or acute illness. No Procedures scheduled in the future at this time. Assessment:   Lab Results   Component Value Date    INR 1.90 (H) 03/27/2023    INR 2.10 (H) 03/09/2023    INR 1.70 (H) 02/27/2023     INR subtherapeutic   Recent Labs     03/27/23  1356   INR 1.90*      Patient has a recent history of labile INRs. Plan:  Coumadin 3 mg x 1 dose today 3/27/23 then continue Coumadin 2 mg MF and 4 mg TuWThSaSu. Recheck INR in 2 week(s). Patient reminded to call the Anticoagulation Clinic with any signs or symptoms of bleeding or with any medication changes. Patient given instructions utilizing the teach back method. After visit summary printed and reviewed with patient. Discharged ambulatory in no apparent distress.     For Pharmacy Admin Tracking Only    Intervention Detail: Dose Adjustment: 1, reason: Therapy Optimization  Total # of Interventions Recommended: 1  Total # of Interventions Accepted: 1  Time Spent (min): 8244  [facility], PharmD, BCPS  3/27/2023  2:36 PM

## 2023-03-30 ENCOUNTER — OFFICE VISIT (OUTPATIENT)
Dept: FAMILY MEDICINE CLINIC | Age: 88
End: 2023-03-30

## 2023-03-30 VITALS
TEMPERATURE: 97.6 F | WEIGHT: 160.4 LBS | HEIGHT: 66 IN | SYSTOLIC BLOOD PRESSURE: 128 MMHG | OXYGEN SATURATION: 95 % | HEART RATE: 65 BPM | DIASTOLIC BLOOD PRESSURE: 68 MMHG | RESPIRATION RATE: 18 BRPM | BODY MASS INDEX: 25.78 KG/M2

## 2023-03-30 DIAGNOSIS — I27.82 OTHER CHRONIC PULMONARY EMBOLISM WITHOUT ACUTE COR PULMONALE (HCC): ICD-10-CM

## 2023-03-30 DIAGNOSIS — E03.9 HYPOTHYROIDISM, UNSPECIFIED TYPE: ICD-10-CM

## 2023-03-30 DIAGNOSIS — Z00.00 MEDICARE ANNUAL WELLNESS VISIT, SUBSEQUENT: Primary | ICD-10-CM

## 2023-03-30 DIAGNOSIS — E78.2 MIXED HYPERLIPIDEMIA: ICD-10-CM

## 2023-03-30 DIAGNOSIS — E07.9 THYROID DISEASE: ICD-10-CM

## 2023-03-30 DIAGNOSIS — I10 ESSENTIAL HYPERTENSION: ICD-10-CM

## 2023-03-30 DIAGNOSIS — L30.9 DERMATITIS: ICD-10-CM

## 2023-03-30 DIAGNOSIS — I87.2 VENOUS STASIS DERMATITIS OF LEFT LOWER EXTREMITY: ICD-10-CM

## 2023-03-30 RX ORDER — DILTIAZEM HYDROCHLORIDE 180 MG/1
360 CAPSULE, EXTENDED RELEASE ORAL DAILY
Qty: 180 CAPSULE | Refills: 4 | Status: SHIPPED | OUTPATIENT
Start: 2023-03-30

## 2023-03-30 RX ORDER — ATORVASTATIN CALCIUM 20 MG/1
20 TABLET, FILM COATED ORAL EVERY EVENING
Qty: 90 TABLET | Refills: 4 | Status: SHIPPED | OUTPATIENT
Start: 2023-03-30

## 2023-03-30 RX ORDER — LEVOTHYROXINE SODIUM 0.12 MG/1
125 TABLET ORAL DAILY
Qty: 90 TABLET | Refills: 4 | Status: SHIPPED | OUTPATIENT
Start: 2023-03-30

## 2023-03-30 SDOH — ECONOMIC STABILITY: FOOD INSECURITY: WITHIN THE PAST 12 MONTHS, THE FOOD YOU BOUGHT JUST DIDN'T LAST AND YOU DIDN'T HAVE MONEY TO GET MORE.: NEVER TRUE

## 2023-03-30 SDOH — ECONOMIC STABILITY: FOOD INSECURITY: WITHIN THE PAST 12 MONTHS, YOU WORRIED THAT YOUR FOOD WOULD RUN OUT BEFORE YOU GOT MONEY TO BUY MORE.: NEVER TRUE

## 2023-03-30 SDOH — ECONOMIC STABILITY: INCOME INSECURITY: HOW HARD IS IT FOR YOU TO PAY FOR THE VERY BASICS LIKE FOOD, HOUSING, MEDICAL CARE, AND HEATING?: NOT HARD AT ALL

## 2023-03-30 SDOH — ECONOMIC STABILITY: HOUSING INSECURITY
IN THE LAST 12 MONTHS, WAS THERE A TIME WHEN YOU DID NOT HAVE A STEADY PLACE TO SLEEP OR SLEPT IN A SHELTER (INCLUDING NOW)?: NO

## 2023-03-30 ASSESSMENT — PATIENT HEALTH QUESTIONNAIRE - PHQ9
DEPRESSION UNABLE TO ASSESS: FUNCTIONAL CAPACITY MOTIVATION LIMITS ACCURACY
SUM OF ALL RESPONSES TO PHQ QUESTIONS 1-9: 0
SUM OF ALL RESPONSES TO PHQ QUESTIONS 1-9: 0
1. LITTLE INTEREST OR PLEASURE IN DOING THINGS: 0
SUM OF ALL RESPONSES TO PHQ QUESTIONS 1-9: 0
2. FEELING DOWN, DEPRESSED OR HOPELESS: 0
SUM OF ALL RESPONSES TO PHQ QUESTIONS 1-9: 0
SUM OF ALL RESPONSES TO PHQ9 QUESTIONS 1 & 2: 0

## 2023-03-30 ASSESSMENT — LIFESTYLE VARIABLES
HOW OFTEN DO YOU HAVE A DRINK CONTAINING ALCOHOL: NEVER
HOW MANY STANDARD DRINKS CONTAINING ALCOHOL DO YOU HAVE ON A TYPICAL DAY: PATIENT DOES NOT DRINK

## 2023-03-30 NOTE — ASSESSMENT & PLAN NOTE
Well-controlled, continue current medications   Pt in anticoagulated and also has a green field filter

## 2023-03-30 NOTE — PROGRESS NOTES
Medicare Annual Wellness Visit    Emma Mesa is here for Medicare AWV (Yearly wellness no concerns )    Assessment & Plan   Medicare annual wellness visit, subsequent  Other chronic pulmonary embolism without acute cor pulmonale (HCC)  Assessment & Plan:   Well-controlled, continue current medications   Pt in anticoagulated and also has a green field filter   Hypothyroidism, unspecified type  -     levothyroxine (SYNTHROID) 125 MCG tablet; Take 1 tablet by mouth Daily Indications: Impaired Thyroid Function Pt wants only synthroid brand name  Takes MTWTHF only. 5-14-18., Disp-90 tablet, R-4Normal  -     TSH With Reflex Ft4; Future  Thyroid disease  Essential hypertension  -     dilTIAZem (DILACOR XR) 180 MG extended release capsule; Take 2 capsules by mouth daily Indications: High Blood Pressure Disorder, Disp-180 capsule, R-4Normal  -     Lipid Panel; Future  Dermatitis  Venous stasis dermatitis of left lower extremity  -worsening   -     betamethasone valerate (VALISONE) 0.1 % cream; Apply topically 2 times daily. , Disp-45 g, R-1, Normal  Mixed hyperlipidemia  -     Lipid Panel; Future    Recommendations for Preventive Services Due: see orders and patient instructions/AVS.  Recommended screening schedule for the next 5-10 years is provided to the patient in written form: see Patient Instructions/AVS.   Pt in agreement with plan   Return in about 1 year (around 3/30/2024) for [address] [place] [address]. Subjective   The following acute and/or chronic problems were also addressed today:  HTN    Does patient check BP regularly at home? - No  Current Medication regimen - lipitor 20 mg daily diltiazem 180 mg bid  pt with history of a fib and PE due to this , symptoms controled no concerns on chronic anticoagulation due to this sees CC  Tolerating medications well? - yes    Shortness of breath or chest pain? No  Headache or visual complaints? No  Neurologic changes like confusion? No  Extremity edema? Mild does wear compression hose.

## 2023-03-30 NOTE — PATIENT INSTRUCTIONS
a deductible, co-insurance, and/or copay. Some of these benefits include a comprehensive review of your medical history including lifestyle, illnesses that may run in your family, and various assessments and screenings as appropriate. After reviewing your medical record and screening and assessments performed today your provider may have ordered immunizations, labs, imaging, and/or referrals for you. A list of these orders (if applicable) as well as your Preventive Care list are included within your After Visit Summary for your review. Other Preventive Recommendations:    A preventive eye exam performed by an eye specialist is recommended every 1-2 years to screen for glaucoma; cataracts, macular degeneration, and other eye disorders. A preventive dental visit is recommended every 6 months. Try to get at least 150 minutes of exercise per week or 10,000 steps per day on a pedometer . Order or download the FREE \"Exercise & Physical Activity: Your Everyday Guide\" from [employer]. Call 4-832.195.7194 or search [employer] online. You need 9690-7112 mg of calcium and 1521-2446 IU of vitamin D per day. It is possible to meet your calcium requirement with diet alone, but a vitamin D supplement is usually necessary to meet this goal.  When exposed to the sun, use a sunscreen that protects against both UVA and UVB radiation with an SPF of 30 or greater. Reapply every 2 to 3 hours or after sweating, drying off with a towel, or swimming. Always wear a seat belt when traveling in a car. Always wear a helmet when riding a bicycle or motorcycle.

## 2023-04-04 ENCOUNTER — HOSPITAL ENCOUNTER (OUTPATIENT)
Age: 88
Discharge: HOME | End: 2023-04-04
Payer: MEDICARE

## 2023-04-04 DIAGNOSIS — I10 ESSENTIAL HYPERTENSION: ICD-10-CM

## 2023-04-04 DIAGNOSIS — E78.2 MIXED HYPERLIPIDEMIA: ICD-10-CM

## 2023-04-04 DIAGNOSIS — E03.9 HYPOTHYROIDISM, UNSPECIFIED TYPE: ICD-10-CM

## 2023-04-04 LAB
CHOLEST SERPL-MCNC: 176 MG/DL (ref 100–199)
HDLC SERPL-MCNC: 72 MG/DL
LDLC SERPL CALC-MCNC: 80 MG/DL
TRIGL SERPL-MCNC: 122 MG/DL (ref 0–199)
TSH SERPL DL<=0.005 MIU/L-ACNC: 1.71 UIU/ML (ref 0.4–4.2)

## 2023-04-04 PROCEDURE — 36415 COLL VENOUS BLD VENIPUNCTURE: CPT

## 2023-04-04 PROCEDURE — 84443 ASSAY THYROID STIM HORMONE: CPT

## 2023-04-04 PROCEDURE — 80061 LIPID PANEL: CPT

## 2023-04-05 ENCOUNTER — TELEPHONE (OUTPATIENT)
Dept: FAMILY MEDICINE CLINIC | Age: 88
End: 2023-04-05

## 2023-04-05 NOTE — TELEPHONE ENCOUNTER
----- Message from LISA Porras CNP sent at 4/4/2023  9:00 PM EDT -----  Let pt know her labs are stable and in appropriate range,

## 2023-04-17 ENCOUNTER — TELEPHONE (OUTPATIENT)
Dept: FAMILY MEDICINE CLINIC | Age: 88
End: 2023-04-17

## 2023-04-17 DIAGNOSIS — Z51.81 ENCOUNTER FOR THERAPEUTIC DRUG MONITORING: ICD-10-CM

## 2023-04-17 DIAGNOSIS — Z79.01 LONG TERM (CURRENT) USE OF ANTICOAGULANTS: ICD-10-CM

## 2023-04-17 DIAGNOSIS — I27.82 CHRONIC PULMONARY EMBOLISM, UNSPECIFIED PULMONARY EMBOLISM TYPE, UNSPECIFIED WHETHER ACUTE COR PULMONALE PRESENT (HCC): Primary | ICD-10-CM

## 2023-04-24 ENCOUNTER — HOSPITAL ENCOUNTER (OUTPATIENT)
Dept: PHARMACY | Age: 88
Setting detail: THERAPIES SERIES
Discharge: HOME | End: 2023-04-24
Payer: MEDICARE

## 2023-04-24 DIAGNOSIS — Z51.81 ENCOUNTER FOR THERAPEUTIC DRUG MONITORING: ICD-10-CM

## 2023-04-24 DIAGNOSIS — I26.99 PULMONARY EMBOLISM, UNSPECIFIED CHRONICITY, UNSPECIFIED PULMONARY EMBOLISM TYPE, UNSPECIFIED WHETHER ACUTE COR PULMONALE PRESENT (HCC): Primary | ICD-10-CM

## 2023-04-24 DIAGNOSIS — Z79.01 ANTICOAGULATED ON COUMADIN: ICD-10-CM

## 2023-04-24 LAB — POC INR: 1.5 (ref 0.8–1.2)

## 2023-04-24 PROCEDURE — 85610 PROTHROMBIN TIME: CPT

## 2023-04-24 PROCEDURE — 99212 OFFICE O/P EST SF 10 MIN: CPT

## 2023-04-24 PROCEDURE — 36416 COLLJ CAPILLARY BLOOD SPEC: CPT

## 2023-04-24 NOTE — PROGRESS NOTES
Medication Management [address]  [phone] (phone)  [phone] (fax)    Ms. Eyal Corona is a 80 y.o.  female with history of PE, per referral from LISA SmithCNP, who presents today for Warfarin monitoring and adjustment (2 week visit after increasing dose by 8.3%). Patient verifies current dosing regimen and tablet strength. No missed or extra doses. Patient denies bleeding/bruising/chest pain. Has usual swelling of legs; wears compression socks bilat. Has usual SOB. No blood in urine or stool. Dietary changes: had approx. 2 tablespoons of cabbage 3 days ago - first in a long time (reminded of Vitamin K content). Denies having green tea/V8 juice/Boost/Ensure/Glucerna. No changes in medication/OTC agents/herbals. No change in alcohol use or tobacco use. No change in activity level. At end of visit, stated she'd had more stress. Patient denies headaches/falls. Has usual minor intermittent lightheadedness. Got a walking stick to help with balance. No vomiting/diarrhea or acute illness. No procedures scheduled in the future at this time, other than having a tooth filled. Assessment:   Lab Results   Component Value Date    INR 1.50 (H) 04/24/2023    INR 1.80 (H) 04/10/2023    INR 1.90 (H) 03/27/2023     INR subtherapeutic - goal 2-3. Recent Labs     04/24/23  1412   INR 1.50*        Plan:  POCT INR performed/result reviewed. 6 mg today and tomorrow, then increase Coumadin to 4 mg daily PO (from 2 mg F, 4 mg MTWThSS=7.7% increase). Recheck INR in 1.5-2 week(s). (Report given - orders entered by DANK Arroyo, PharmD.)  Patient reminded to call the Anticoagulation Clinic with any signs or symptoms of bleeding or with any medication changes. Patient given instructions utilizing the teach back method. After visit summary printed and reviewed with patient. Discharged ambulatory in no apparent distress.     For Pharmacy Admin

## 2023-05-09 ENCOUNTER — HOSPITAL ENCOUNTER (OUTPATIENT)
Dept: PHARMACY | Age: 88
Setting detail: THERAPIES SERIES
Discharge: HOME | End: 2023-05-09
Payer: MEDICARE

## 2023-05-09 DIAGNOSIS — Z79.01 ANTICOAGULATED ON COUMADIN: ICD-10-CM

## 2023-05-09 DIAGNOSIS — Z51.81 ENCOUNTER FOR THERAPEUTIC DRUG MONITORING: ICD-10-CM

## 2023-05-09 DIAGNOSIS — I26.99 PULMONARY EMBOLISM, UNSPECIFIED CHRONICITY, UNSPECIFIED PULMONARY EMBOLISM TYPE, UNSPECIFIED WHETHER ACUTE COR PULMONALE PRESENT (HCC): Primary | ICD-10-CM

## 2023-05-09 LAB — POC INR: 2.3 (ref 0.8–1.2)

## 2023-05-09 PROCEDURE — 85610 PROTHROMBIN TIME: CPT | Performed by: PHARMACIST

## 2023-05-09 PROCEDURE — 36416 COLLJ CAPILLARY BLOOD SPEC: CPT | Performed by: PHARMACIST

## 2023-05-09 PROCEDURE — 99211 OFF/OP EST MAY X REQ PHY/QHP: CPT | Performed by: PHARMACIST

## 2023-05-09 NOTE — PROGRESS NOTES
Medication Management [address] [place] [address]  [phone] (phone)  [phone] (fax)    Ms. Hilary Hernández is a 80 y.o.  female with history of PE who presents today for anticoagulation monitoring and adjustment. Patient verifies current dosing regimen and tablet strength. No missed or extra doses. Patient denies s/s bleeding/bruising/swelling/SOB/chest pain  No blood in urine or stool. No dietary changes. No changes in medication/OTC agents/Herbals. No change in alcohol use or tobacco use. No change in activity level. Patient denies headaches/dizziness/lightheadedness/falls. No vomiting/diarrhea or acute illness. No Procedures scheduled in the future at this time. Assessment:   Lab Results   Component Value Date    INR 2.30 (H) 05/09/2023    INR 1.50 (H) 04/24/2023    INR 1.80 (H) 04/10/2023     INR therapeutic   Recent Labs     05/09/23  1405   INR 2.30*         Plan:  Continue Coumadin 4mg daily. Recheck INR in 2 week(s). Patient reminded to call the Anticoagulation Clinic with any signs or symptoms of bleeding or with any medication changes. Patient given instructions utilizing the teach back method. After visit summary printed and reviewed with patient. Discharged ambulatory in no apparent distress.     For Pharmacy Admin Tracking Only    Time Spent (min): 20

## 2023-05-23 ENCOUNTER — HOSPITAL ENCOUNTER (OUTPATIENT)
Dept: PHARMACY | Age: 88
Setting detail: THERAPIES SERIES
Discharge: HOME | End: 2023-05-23
Payer: MEDICARE

## 2023-05-23 DIAGNOSIS — Z79.01 ANTICOAGULATED ON COUMADIN: ICD-10-CM

## 2023-05-23 DIAGNOSIS — Z51.81 ENCOUNTER FOR THERAPEUTIC DRUG MONITORING: ICD-10-CM

## 2023-05-23 DIAGNOSIS — I26.99 PULMONARY EMBOLISM, UNSPECIFIED CHRONICITY, UNSPECIFIED PULMONARY EMBOLISM TYPE, UNSPECIFIED WHETHER ACUTE COR PULMONALE PRESENT (HCC): Primary | ICD-10-CM

## 2023-05-23 LAB — POC INR: 2.7 (ref 0.8–1.2)

## 2023-05-23 PROCEDURE — 36416 COLLJ CAPILLARY BLOOD SPEC: CPT

## 2023-05-23 PROCEDURE — 99211 OFF/OP EST MAY X REQ PHY/QHP: CPT

## 2023-05-23 PROCEDURE — 85610 PROTHROMBIN TIME: CPT

## 2023-05-23 NOTE — PROGRESS NOTES
Medication Management [address]  [phone] (phone)  [phone] (fax)    Ms. Erica Ruiz is a 80 y.o.  female with history of PE, per referral from BENNIE Cooney, who presents today for Warfarin monitoring and adjustment (2 week visit). Patient verifies current dosing regimen and tablet strength. No missed or extra doses. Patient denies bruising/SOB/chest pain. Has usual swelling of legs - wears compression hose bilat. C/o fatigue. Has seen a little blood on tissue after wiping or blowing nose. Had put humidifier away; reminded she may need it all year round to keep air moist.  No blood in urine or stool. No dietary changes. No changes in medication/OTC agents/herbals. Patient is considering starting cranberry pills; given option to start right away, then check INR in 7-10 days, or start 1 week before next visit - opted for latter (could raise INR). No change in alcohol use or tobacco use. No change in activity level. Patient denies headaches/falls. Has usual dizziness. Sometimes has a \"ping\" in head - not really a headache. No vomiting/diarrhea or acute illness. No procedures scheduled in the future at this time. Assessment:   Lab Results   Component Value Date    INR 2.70 (H) 05/23/2023    INR 2.30 (H) 05/09/2023    INR 1.50 (H) 04/24/2023     INR therapeutic - goal 2-3. Recent Labs     05/23/23  1422   INR 2.70*        Plan:  POCT INR performed/result reviewed. Continue Coumadin 4 mg daily PO. Recheck INR in 3 week(s). Patient reminded to call the Anticoagulation Clinic with any signs or symptoms of bleeding or with any medication changes. Patient given instructions utilizing the teach back method. After visit summary printed and reviewed with patient. Discharged ambulatory in no apparent distress.     For Pharmacy Admin Tracking Only    Time Spent (min): 20

## 2023-07-11 ENCOUNTER — HOSPITAL ENCOUNTER (OUTPATIENT)
Dept: PHARMACY | Age: 88
Setting detail: THERAPIES SERIES
Discharge: HOME | End: 2023-07-11
Payer: MEDICARE

## 2023-07-11 DIAGNOSIS — Z79.01 ANTICOAGULATED ON COUMADIN: ICD-10-CM

## 2023-07-11 DIAGNOSIS — Z51.81 ENCOUNTER FOR THERAPEUTIC DRUG MONITORING: ICD-10-CM

## 2023-07-11 DIAGNOSIS — I26.99 PULMONARY EMBOLISM, UNSPECIFIED CHRONICITY, UNSPECIFIED PULMONARY EMBOLISM TYPE, UNSPECIFIED WHETHER ACUTE COR PULMONALE PRESENT (HCC): Primary | ICD-10-CM

## 2023-07-11 LAB — POC INR: 1.7 (ref 0.8–1.2)

## 2023-07-11 PROCEDURE — 99212 OFFICE O/P EST SF 10 MIN: CPT

## 2023-07-11 PROCEDURE — 36416 COLLJ CAPILLARY BLOOD SPEC: CPT

## 2023-07-11 PROCEDURE — 85610 PROTHROMBIN TIME: CPT

## 2023-07-11 NOTE — PROGRESS NOTES
Medication Management [address] [place] [address]  [phone] (phone)  [phone] (fax)    Ms. Jeramy Wang is a 80 y.o.  female with history of PE, per referral from BENNIE Kay, who presents today for Warfarin monitoring and adjustment (4 week visit). Patient verifies current dosing regimen and tablet strength. No missed or extra doses. Patient denies bruising/swelling. Saw a little blood on tissue with wiping nose frequently last week. Wears support hose bilat. No dietary changes. No changes in medication/OTC agents/herbals. No change in alcohol use or tobacco use. Change in activity level: decreased with last week's illness, but back to usual now. Patient denies falls. No vomiting/diarrhea or acute illness. Didn't feel well last week - had runny nose, increased SOB, but denied fever. Took nothing. No procedures scheduled in the future at this time. Assessment:     Lab Results   Component Value Date    INR 1.70 (H) 07/11/2023    INR 2.80 (H) 06/13/2023    INR 2.70 (H) 05/23/2023     INR subtherapeutic - goal 2-3. Recent Labs     07/11/23  1406   INR 1.70*      Plan:  POCT INR performed/result reviewed. 6 mg today, per policy, then continue Coumadin 4 mg daily PO. Recheck INR in 3 week(s), per policy. Patient reminded to call the Anticoagulation Clinic with any signs or symptoms of bleeding or with any medication changes. Patient given instructions utilizing the teach back method. After visit summary printed and reviewed with patient. Discharged ambulatory in no apparent distress.     For Pharmacy Admin Tracking Only    Intervention Detail: Dose Adjustment: 1, reason: Therapy Optimization  Total # of Interventions Recommended: 1  Total # of Interventions Accepted: 1  Time Spent (min): 20

## 2023-07-27 ENCOUNTER — OFFICE VISIT (OUTPATIENT)
Dept: UROLOGY | Age: 88
End: 2023-07-27
Payer: MEDICARE

## 2023-07-27 VITALS
SYSTOLIC BLOOD PRESSURE: 112 MMHG | WEIGHT: 155.3 LBS | HEIGHT: 66 IN | BODY MASS INDEX: 24.96 KG/M2 | DIASTOLIC BLOOD PRESSURE: 76 MMHG

## 2023-07-27 DIAGNOSIS — N32.81 OVERACTIVE BLADDER: ICD-10-CM

## 2023-07-27 DIAGNOSIS — R31.21 ASYMPTOMATIC MICROSCOPIC HEMATURIA: ICD-10-CM

## 2023-07-27 DIAGNOSIS — N39.0 RECURRENT UTI: ICD-10-CM

## 2023-07-27 DIAGNOSIS — R10.2 PELVIC PAIN: ICD-10-CM

## 2023-07-27 DIAGNOSIS — R35.1 NOCTURIA: ICD-10-CM

## 2023-07-27 DIAGNOSIS — Q62.5 DUPLICATED URETER, LEFT: Primary | ICD-10-CM

## 2023-07-27 PROCEDURE — 81003 URINALYSIS AUTO W/O SCOPE: CPT

## 2023-07-27 PROCEDURE — 99214 OFFICE O/P EST MOD 30 MIN: CPT

## 2023-07-27 PROCEDURE — 1123F ACP DISCUSS/DSCN MKR DOCD: CPT

## 2023-07-27 NOTE — PATIENT INSTRUCTIONS
-Timed voids, double voids   -continue d-mannose   -avoid bladder irritants   -call if you are taking ditropan/oxybutynin and would like a refill   -call if you need anything  -Call with questions, comments, or concerns. I recommend going to the ED for further evaluation if you develop fever, chills, nausea, vomiting, chest pain, SOB, or calf pain.

## 2023-07-27 NOTE — PROGRESS NOTES
Patient:  Lucia Mesa  YOB: 1934  Date: 7/27/2023    HISTORY OF PRESENT ILLNESS:   The patient is a 80 y.o. female who presents today for evaluation of the following problems:       7/27/23   Ms. Mesa is an 30-year-old female that presents in follow-up for nocturia, recurrent UTI, pelvic pain, OAB, asymptomatic microscopic hematuria, and duplicated left ureteral system complete. The patient is s/p cystoscopy bilateral retrograde pyelogram with Dr. Katty Danielle on 8/23/2022 secondary to asymptomatic microscopic hematuria    She takes ditropan for management of OAB symptoms and nocturia. She does take a 5 mg dose as she had too many negative side effects with 10 mg daily. Ms. Vickie Shearer had a cystoscopy with hydro-distension, bilateral retrograde pyelogram, left ureteroscopy with balloon dilation of the distal ureter/ureterocele and placement of a left ureteral stent in 2015 by Dr. Justine Chaparro secondary to Bladder pain and left duplicated system with evidence  of left ureterocele. This occurred on 08/8/2015. In terms of UTIs, she did have one in 11/2022. This was the first one in 6 years. Overall the problem(s) : are improving. Associated Symptoms: No dysuria, gross hematuria. No fevers or chills. No nausea or vomiting. Pain Controlled. UA: unable to see blood in the urine       Last BUN and creatinine:  Lab Results   Component Value Date    BUN 14 08/09/2022     Lab Results   Component Value Date    CREATININE 0.7 08/09/2022       PAST MEDICAL, FAMILY AND SOCIAL HISTORY UPDATE:  Past Medical History:   Diagnosis Date    Arthritis     wrist, back, neck, foot    Blood circulation, collateral     Breathing difficulty     Chronic kidney disease     DVT (deep venous thrombosis) ([address])     History of blood transfusion 1960's    child birth    Hx of blood clots 2013? ??    left leg    Hyperlipidemia     Hypertension     Pneumonia     Prolonged emergence from general anesthesia     with

## 2023-08-01 ENCOUNTER — HOSPITAL ENCOUNTER (OUTPATIENT)
Dept: PHARMACY | Age: 88
Setting detail: THERAPIES SERIES
Discharge: HOME | End: 2023-08-01
Payer: MEDICARE

## 2023-08-01 DIAGNOSIS — I26.99 PULMONARY EMBOLISM, UNSPECIFIED CHRONICITY, UNSPECIFIED PULMONARY EMBOLISM TYPE, UNSPECIFIED WHETHER ACUTE COR PULMONALE PRESENT (HCC): Primary | ICD-10-CM

## 2023-08-01 DIAGNOSIS — Z79.01 ANTICOAGULATED ON COUMADIN: ICD-10-CM

## 2023-08-01 DIAGNOSIS — Z51.81 ENCOUNTER FOR THERAPEUTIC DRUG MONITORING: ICD-10-CM

## 2023-08-01 LAB — POC INR: 2 (ref 0.8–1.2)

## 2023-08-01 PROCEDURE — 99211 OFF/OP EST MAY X REQ PHY/QHP: CPT

## 2023-08-01 PROCEDURE — 85610 PROTHROMBIN TIME: CPT

## 2023-08-01 PROCEDURE — 36416 COLLJ CAPILLARY BLOOD SPEC: CPT

## 2023-08-01 NOTE — PROGRESS NOTES
Medication Management [address] [place] [address]  [phone] (phone)  [phone] (fax)    Ms. Kerry Oh is a 80 y.o.  female with history of PE, per referral from BENNIE Tran, who presents today for Warfarin monitoring and adjustment (3 week visit - early for today's visit). Patient verifies current dosing regimen and tablet strength. Will be getting refill soon. No missed or extra doses, except for bolus ordered last visit. Patient denies bleeding/bruising/swelling. Has usual varying SOB. Wears compression socks bilat. No blood in urine or stool. Dietary changes: had a little more green beans than usual - probably will cut back. No changes in medication/OTC agents/herbals. No change in alcohol use or tobacco use. No change in activity level. Patient denies falls. No vomiting/diarrhea or acute illness. No procedures scheduled in the future at this time. Assessment:   Lab Results   Component Value Date    INR 2.00 (H) 08/01/2023    INR 1.70 (H) 07/11/2023    INR 2.80 (H) 06/13/2023     INR therapeutic - goal 2-3. Recent Labs     08/01/23  1424   INR 2.00*        Plan:  POCT INR performed/result reviewed. Continue Coumadin 4 mg daily PO. Recheck INR in 3 week(s). Patient reminded to call the Anticoagulation Clinic with any signs or symptoms of bleeding or with any medication changes. Patient given instructions utilizing the teach back method. After visit summary printed and reviewed with patient. Discharged ambulatory in no apparent distress.     For Pharmacy Admin Tracking Only    Time Spent (min): 20

## 2023-08-22 ENCOUNTER — APPOINTMENT (OUTPATIENT)
Dept: PHARMACY | Age: 88
End: 2023-08-22
Payer: MEDICARE

## 2023-08-22 DIAGNOSIS — Z51.81 ENCOUNTER FOR THERAPEUTIC DRUG MONITORING: ICD-10-CM

## 2023-08-22 DIAGNOSIS — Z79.01 ANTICOAGULATED ON COUMADIN: ICD-10-CM

## 2023-08-22 DIAGNOSIS — I26.99 PULMONARY EMBOLISM, UNSPECIFIED CHRONICITY, UNSPECIFIED PULMONARY EMBOLISM TYPE, UNSPECIFIED WHETHER ACUTE COR PULMONALE PRESENT (HCC): Primary | ICD-10-CM

## 2023-08-22 LAB — POC INR: 2.6 (ref 0.8–1.2)

## 2023-08-22 PROCEDURE — 85610 PROTHROMBIN TIME: CPT

## 2023-08-22 PROCEDURE — 99211 OFF/OP EST MAY X REQ PHY/QHP: CPT

## 2023-08-22 PROCEDURE — 36416 COLLJ CAPILLARY BLOOD SPEC: CPT

## 2023-08-22 NOTE — PROGRESS NOTES
Medication Management [address] [place] [address]  [phone] (phone)  [phone] (fax)    Ms. Kuldeep Sharif is a 80 y.o.  female with history of PE, per referral from Dennie Barber, APRN-CNP, who presents today for Warfarin monitoring and adjustment (3 week visit). Patient verifies current dosing regimen and tablet strength. States has at least enough until next visit. No missed or extra doses. Patient denies bleeding. Has usual easy bruising. Has more SOB - been lifting boxes (in process of moving). Has usual left leg/foot swelling; wears compression hose bilat. No blood in urine or stool. No dietary changes. No changes in medication/OTC agents/herbals, except for more Tylenol than usual for sore arms. No change in alcohol use or tobacco use. Change in activity level: increased, plus more stress. Patient denies falls. No vomiting/diarrhea or acute illness. No procedures scheduled in the future at this time. Assessment:   Lab Results   Component Value Date    INR 2.60 (H) 08/22/2023    INR 2.00 (H) 08/01/2023    INR 1.70 (H) 07/11/2023     INR therapeutic - goal 2-3. Recent Labs     08/22/23  1404   INR 2.60*        Plan:  POCT INR performed/result reviewed. Continue Coumadin 4 mg daily PO. Recheck INR in 4 week(s). Patient reminded to call the Anticoagulation Clinic with any signs or symptoms of bleeding or with any medication changes. Patient given instructions utilizing the teach back method. After visit summary printed and reviewed with patient. Discharged ambulatory in no apparent distress.     For Pharmacy Admin Tracking Only  Time Spent (min): 20

## 2023-09-19 ENCOUNTER — HOSPITAL ENCOUNTER (OUTPATIENT)
Dept: PHARMACY | Age: 88
Setting detail: THERAPIES SERIES
Discharge: HOME | End: 2023-09-19
Payer: MEDICARE

## 2023-09-19 DIAGNOSIS — Z79.01 ANTICOAGULATED ON COUMADIN: ICD-10-CM

## 2023-09-19 DIAGNOSIS — I26.99 PULMONARY EMBOLISM, UNSPECIFIED CHRONICITY, UNSPECIFIED PULMONARY EMBOLISM TYPE, UNSPECIFIED WHETHER ACUTE COR PULMONALE PRESENT (HCC): Primary | ICD-10-CM

## 2023-09-19 DIAGNOSIS — Z51.81 ENCOUNTER FOR THERAPEUTIC DRUG MONITORING: ICD-10-CM

## 2023-09-19 LAB — POC INR: 3.5 (ref 0.8–1.2)

## 2023-09-19 PROCEDURE — 99212 OFFICE O/P EST SF 10 MIN: CPT

## 2023-09-19 PROCEDURE — 85610 PROTHROMBIN TIME: CPT

## 2023-09-19 PROCEDURE — 36416 COLLJ CAPILLARY BLOOD SPEC: CPT

## 2023-09-19 NOTE — PROGRESS NOTES
Medication Management [address] [place] [address]  [phone] (phone)  [phone] (fax)    Ms. Angela Christy is a 80 y.o.  female with history of PE, per referral from BENNIE Purcell, who presents today for Warfarin monitoring and adjustment (4 week visit). Patient verifies current dosing regimen and tablet strength. States has good supply. No missed or extra doses. Patient denies bleeding/swelling. Has usual SOB/easy bruising. Wears compression socks bilat. No blood in urine or stool. No dietary changes. No changes in medication/OTC agents/herbals. No change in alcohol use or tobacco use. Change in activity level: increased, plus more stress (stress is starting to improve). Patient denies falls. No vomiting/diarrhea or acute illness. No procedures scheduled in the future at this time. Mirror fell over and hit her head last week. Had no symptoms afterward. Reminded to go to ER after any head trauma. Daughter tried to take her, but refused. Assessment:     Lab Results   Component Value Date    INR 3.50 (H) 09/19/2023    INR 2.60 (H) 08/22/2023    INR 2.00 (H) 08/01/2023     INR supratherapeutic - goal 2-3. Recent Labs     09/19/23  1415   INR 3.50*      Plan:  POCT INR performed/result reviewed. 2 mg today and tomorrow, per policy, then continue Coumadin 4 mg daily PO. Recheck INR in 2 week(s), per policy. Patient reminded to call the Anticoagulation Clinic with any signs or symptoms of bleeding or with any medication changes. Patient given instructions utilizing the teach back method. After visit summary printed and reviewed with patient. Advised extra caution. Discharged ambulatory in no apparent distress.     For Pharmacy Admin Tracking Only    Intervention Detail: Dose Adjustment: 1, reason: Therapy De-escalation  Total # of Interventions Recommended: 1  Total # of Interventions Accepted: 1  Time Spent (min):  20

## 2023-10-03 ENCOUNTER — HOSPITAL ENCOUNTER (OUTPATIENT)
Dept: PHARMACY | Age: 88
Setting detail: THERAPIES SERIES
Discharge: HOME | End: 2023-10-03
Payer: MEDICARE

## 2023-10-03 DIAGNOSIS — I26.99 PULMONARY EMBOLISM, UNSPECIFIED CHRONICITY, UNSPECIFIED PULMONARY EMBOLISM TYPE, UNSPECIFIED WHETHER ACUTE COR PULMONALE PRESENT (HCC): Primary | ICD-10-CM

## 2023-10-03 DIAGNOSIS — Z51.81 ENCOUNTER FOR THERAPEUTIC DRUG MONITORING: ICD-10-CM

## 2023-10-03 DIAGNOSIS — Z79.01 ANTICOAGULATED ON COUMADIN: ICD-10-CM

## 2023-10-03 LAB — POC INR: 1.9 (ref 0.8–1.2)

## 2023-10-03 PROCEDURE — 36416 COLLJ CAPILLARY BLOOD SPEC: CPT | Performed by: PHARMACIST

## 2023-10-03 PROCEDURE — 99211 OFF/OP EST MAY X REQ PHY/QHP: CPT | Performed by: PHARMACIST

## 2023-10-03 PROCEDURE — 85610 PROTHROMBIN TIME: CPT | Performed by: PHARMACIST

## 2023-10-03 NOTE — PROGRESS NOTES
Medication Management [address] [place] [address]  [phone] (phone)  [phone] (fax)    Ms. Cheri Marquez is a 80 y.o.  female with history of PE who presents today for anticoagulation monitoring and adjustment. Patient verifies current dosing regimen and tablet strength. No missed or extra doses. Patient denies s/s bleeding/bruising/swelling/SOB  No blood in urine or stool. No dietary changes. No changes in medication/OTC agents/Herbals. No change in alcohol use or tobacco use. More active last 3-4 weeks, was in the process of moving  Patient denies falls. No vomiting/diarrhea or acute illness. No Procedures scheduled in the future at this time. Assessment:   Lab Results   Component Value Date    INR 1.90 (H) 10/03/2023    INR 3.50 (H) 09/19/2023    INR 2.60 (H) 08/22/2023     INR subtherapeutic   Recent Labs     10/03/23  1411   INR 1.90*      Patient interview completed and discussed with pharmacist by Ayaka CauseyD candidate       Plan:  Continue Coumadin 4mg daily. Recheck INR in 3 week(s). Patient reminded to call the Anticoagulation Clinic with any signs or symptoms of bleeding or with any medication changes. Patient given instructions utilizing the teach back method. After visit summary printed and reviewed with patient. Discharged ambulatory in no apparent distress.      For Pharmacy Admin Tracking Only    Time Spent (min): 20

## 2023-10-24 ENCOUNTER — APPOINTMENT (OUTPATIENT)
Dept: PHARMACY | Age: 88
End: 2023-10-24
Payer: MEDICARE

## 2023-10-26 ENCOUNTER — HOSPITAL ENCOUNTER (INPATIENT)
Age: 88
LOS: 4 days | Discharge: HOME | DRG: 479 | End: 2023-11-03
Attending: EMERGENCY MEDICINE | Admitting: HOSPITALIST
Payer: MEDICARE

## 2023-10-26 ENCOUNTER — APPOINTMENT (OUTPATIENT)
Dept: CT IMAGING | Age: 88
DRG: 479 | End: 2023-10-26
Payer: MEDICARE

## 2023-10-26 DIAGNOSIS — G89.29 ACUTE EXACERBATION OF CHRONIC LOW BACK PAIN: ICD-10-CM

## 2023-10-26 DIAGNOSIS — M54.50 ACUTE EXACERBATION OF CHRONIC LOW BACK PAIN: ICD-10-CM

## 2023-10-26 DIAGNOSIS — M54.59 INTRACTABLE LOW BACK PAIN: Primary | ICD-10-CM

## 2023-10-26 DIAGNOSIS — S32.030B COMPRESSION FRACTURE OF L3 LUMBAR VERTEBRA, OPEN, INITIAL ENCOUNTER (HCC): ICD-10-CM

## 2023-10-26 DIAGNOSIS — Z79.01 ANTICOAGULATED ON COUMADIN: ICD-10-CM

## 2023-10-26 PROBLEM — M54.9 INTRACTABLE BACK PAIN: Status: ACTIVE | Noted: 2023-10-26

## 2023-10-26 LAB
ALBUMIN SERPL BCG-MCNC: 4.3 G/DL (ref 3.5–5.1)
ALP SERPL-CCNC: 128 U/L (ref 38–126)
ALT SERPL W/O P-5'-P-CCNC: 14 U/L (ref 11–66)
ANION GAP SERPL CALC-SCNC: 14 MEQ/L (ref 8–16)
AST SERPL-CCNC: 23 U/L (ref 5–40)
BACTERIA URNS QL MICRO: ABNORMAL /HPF
BASOPHILS ABSOLUTE: 0.1 THOU/MM3 (ref 0–0.1)
BASOPHILS NFR BLD AUTO: 0.5 %
BILIRUB SERPL-MCNC: 0.8 MG/DL (ref 0.3–1.2)
BILIRUB UR QL STRIP.AUTO: NEGATIVE
BUN SERPL-MCNC: 13 MG/DL (ref 7–22)
CALCIUM SERPL-MCNC: 9.7 MG/DL (ref 8.5–10.5)
CASTS #/AREA URNS LPF: ABNORMAL /LPF
CASTS 2: ABNORMAL /LPF
CHARACTER UR: CLEAR
CHLORIDE SERPL-SCNC: 98 MEQ/L (ref 98–111)
CK SERPL-CCNC: 49 U/L (ref 30–135)
CO2 SERPL-SCNC: 27 MEQ/L (ref 23–33)
COLOR: YELLOW
CREAT SERPL-MCNC: 0.6 MG/DL (ref 0.4–1.2)
CRP SERPL-MCNC: 0.97 MG/DL (ref 0–1)
CRYSTALS URNS MICRO: ABNORMAL
DEPRECATED RDW RBC AUTO: 42.8 FL (ref 35–45)
EOSINOPHIL NFR BLD AUTO: 0.2 %
EOSINOPHILS ABSOLUTE: 0 THOU/MM3 (ref 0–0.4)
EPITHELIAL CELLS, UA: ABNORMAL /HPF
ERYTHROCYTE [DISTWIDTH] IN BLOOD BY AUTOMATED COUNT: 12.8 % (ref 11.5–14.5)
ERYTHROCYTE [SEDIMENTATION RATE] IN BLOOD BY WESTERGREN METHOD: 33 MM/HR (ref 0–20)
GFR SERPL CREATININE-BSD FRML MDRD: > 60 ML/MIN/1.73M2
GLUCOSE SERPL-MCNC: 120 MG/DL (ref 70–108)
GLUCOSE UR QL STRIP.AUTO: NEGATIVE MG/DL
HCT VFR BLD AUTO: 47.9 % (ref 37–47)
HGB BLD-MCNC: 15.5 GM/DL (ref 12–16)
HGB UR QL STRIP.AUTO: ABNORMAL
IMM GRANULOCYTES # BLD AUTO: 0.03 THOU/MM3 (ref 0–0.07)
IMM GRANULOCYTES NFR BLD AUTO: 0.3 %
INR PPP: 2.41 (ref 0.85–1.13)
KETONES UR QL STRIP.AUTO: NEGATIVE
LIPASE SERPL-CCNC: 14.2 U/L (ref 5.6–51.3)
LYMPHOCYTES ABSOLUTE: 4.1 THOU/MM3 (ref 1–4.8)
LYMPHOCYTES NFR BLD AUTO: 40.5 %
MCH RBC QN AUTO: 29.5 PG (ref 26–33)
MCHC RBC AUTO-ENTMCNC: 32.4 GM/DL (ref 32.2–35.5)
MCV RBC AUTO: 91.1 FL (ref 81–99)
MISCELLANEOUS 2: ABNORMAL
MONOCYTES ABSOLUTE: 0.7 THOU/MM3 (ref 0.4–1.3)
MONOCYTES NFR BLD AUTO: 7.2 %
NEUTROPHILS NFR BLD AUTO: 51.3 %
NITRITE UR QL STRIP: NEGATIVE
NRBC BLD AUTO-RTO: 0 /100 WBC
OSMOLALITY SERPL CALC.SUM OF ELEC: 278.8 MOSMOL/KG (ref 275–300)
PH UR STRIP.AUTO: 7.5 [PH] (ref 5–9)
PLATELET # BLD AUTO: 247 THOU/MM3 (ref 130–400)
PMV BLD AUTO: 11.6 FL (ref 9.4–12.4)
POTASSIUM SERPL-SCNC: 4 MEQ/L (ref 3.5–5.2)
PROT SERPL-MCNC: 8 G/DL (ref 6.1–8)
PROT UR STRIP.AUTO-MCNC: NEGATIVE MG/DL
RBC # BLD AUTO: 5.26 MILL/MM3 (ref 4.2–5.4)
RBC URINE: ABNORMAL /HPF
RENAL EPI CELLS #/AREA URNS HPF: ABNORMAL /[HPF]
SEGMENTED NEUTROPHILS ABSOLUTE COUNT: 5.2 THOU/MM3 (ref 1.8–7.7)
SODIUM SERPL-SCNC: 139 MEQ/L (ref 135–145)
SP GR UR REFRACT.AUTO: 1.02 (ref 1–1.03)
TROPONIN, HIGH SENSITIVITY: 8 NG/L (ref 0–12)
UROBILINOGEN, URINE: 0.2 EU/DL (ref 0–1)
WBC # BLD AUTO: 10.1 THOU/MM3 (ref 4.8–10.8)
WBC #/AREA URNS HPF: ABNORMAL /HPF
WBC #/AREA URNS HPF: NEGATIVE /[HPF]
YEAST LIKE FUNGI URNS QL MICRO: ABNORMAL

## 2023-10-26 PROCEDURE — 85651 RBC SED RATE NONAUTOMATED: CPT

## 2023-10-26 PROCEDURE — 96374 THER/PROPH/DIAG INJ IV PUSH: CPT

## 2023-10-26 PROCEDURE — 76376 3D RENDER W/INTRP POSTPROCES: CPT

## 2023-10-26 PROCEDURE — 84484 ASSAY OF TROPONIN QUANT: CPT

## 2023-10-26 PROCEDURE — 99223 1ST HOSP IP/OBS HIGH 75: CPT | Performed by: HOSPITALIST

## 2023-10-26 PROCEDURE — 82550 ASSAY OF CK (CPK): CPT

## 2023-10-26 PROCEDURE — 96375 TX/PRO/DX INJ NEW DRUG ADDON: CPT

## 2023-10-26 PROCEDURE — 85610 PROTHROMBIN TIME: CPT

## 2023-10-26 PROCEDURE — 6360000002 HC RX W HCPCS: Performed by: EMERGENCY MEDICINE

## 2023-10-26 PROCEDURE — G0378 HOSPITAL OBSERVATION PER HR: HCPCS

## 2023-10-26 PROCEDURE — 96376 TX/PRO/DX INJ SAME DRUG ADON: CPT

## 2023-10-26 PROCEDURE — 2580000003 HC RX 258: Performed by: EMERGENCY MEDICINE

## 2023-10-26 PROCEDURE — 6360000002 HC RX W HCPCS: Performed by: HOSPITALIST

## 2023-10-26 PROCEDURE — 74177 CT ABD & PELVIS W/CONTRAST: CPT

## 2023-10-26 PROCEDURE — 6360000004 HC RX CONTRAST MEDICATION: Performed by: EMERGENCY MEDICINE

## 2023-10-26 PROCEDURE — 83690 ASSAY OF LIPASE: CPT

## 2023-10-26 PROCEDURE — 6370000000 HC RX 637 (ALT 250 FOR IP): Performed by: HOSPITALIST

## 2023-10-26 PROCEDURE — 85025 COMPLETE CBC W/AUTO DIFF WBC: CPT

## 2023-10-26 PROCEDURE — 36415 COLL VENOUS BLD VENIPUNCTURE: CPT

## 2023-10-26 PROCEDURE — 86140 C-REACTIVE PROTEIN: CPT

## 2023-10-26 PROCEDURE — 99285 EMERGENCY DEPT VISIT HI MDM: CPT

## 2023-10-26 PROCEDURE — 80053 COMPREHEN METABOLIC PANEL: CPT

## 2023-10-26 PROCEDURE — 81001 URINALYSIS AUTO W/SCOPE: CPT

## 2023-10-26 RX ORDER — ACETAMINOPHEN 500 MG
500 TABLET ORAL EVERY 6 HOURS PRN
Status: DISCONTINUED | OUTPATIENT
Start: 2023-10-26 | End: 2023-10-26 | Stop reason: SDUPTHER

## 2023-10-26 RX ORDER — LEVOTHYROXINE SODIUM 0.12 MG/1
125 TABLET ORAL DAILY
Status: DISCONTINUED | OUTPATIENT
Start: 2023-10-26 | End: 2023-10-26 | Stop reason: DRUGHIGH

## 2023-10-26 RX ORDER — HYDROCODONE BITARTRATE AND ACETAMINOPHEN 5; 325 MG/1; MG/1
1 TABLET ORAL EVERY 8 HOURS PRN
Status: DISCONTINUED | OUTPATIENT
Start: 2023-10-26 | End: 2023-11-03 | Stop reason: HOSPADM

## 2023-10-26 RX ORDER — ONDANSETRON 2 MG/ML
4 INJECTION INTRAMUSCULAR; INTRAVENOUS EVERY 6 HOURS PRN
Status: DISCONTINUED | OUTPATIENT
Start: 2023-10-26 | End: 2023-11-03 | Stop reason: HOSPADM

## 2023-10-26 RX ORDER — SODIUM CHLORIDE 9 MG/ML
INJECTION, SOLUTION INTRAVENOUS PRN
Status: DISCONTINUED | OUTPATIENT
Start: 2023-10-26 | End: 2023-11-03 | Stop reason: HOSPADM

## 2023-10-26 RX ORDER — MORPHINE SULFATE 2 MG/ML
2 INJECTION, SOLUTION INTRAMUSCULAR; INTRAVENOUS ONCE
Status: COMPLETED | OUTPATIENT
Start: 2023-10-26 | End: 2023-10-26

## 2023-10-26 RX ORDER — SODIUM CHLORIDE 0.9 % (FLUSH) 0.9 %
5-40 SYRINGE (ML) INJECTION EVERY 12 HOURS SCHEDULED
Status: DISCONTINUED | OUTPATIENT
Start: 2023-10-26 | End: 2023-11-03 | Stop reason: HOSPADM

## 2023-10-26 RX ORDER — ACETAMINOPHEN 325 MG/1
650 TABLET ORAL EVERY 6 HOURS PRN
Status: DISCONTINUED | OUTPATIENT
Start: 2023-10-26 | End: 2023-11-03 | Stop reason: HOSPADM

## 2023-10-26 RX ORDER — SODIUM CHLORIDE 0.9 % (FLUSH) 0.9 %
5-40 SYRINGE (ML) INJECTION PRN
Status: DISCONTINUED | OUTPATIENT
Start: 2023-10-26 | End: 2023-11-03 | Stop reason: HOSPADM

## 2023-10-26 RX ORDER — ACETAMINOPHEN 650 MG/1
650 SUPPOSITORY RECTAL EVERY 6 HOURS PRN
Status: DISCONTINUED | OUTPATIENT
Start: 2023-10-26 | End: 2023-11-03 | Stop reason: HOSPADM

## 2023-10-26 RX ORDER — CYCLOBENZAPRINE HCL 10 MG
5 TABLET ORAL 3 TIMES DAILY PRN
Status: DISCONTINUED | OUTPATIENT
Start: 2023-10-26 | End: 2023-11-03 | Stop reason: HOSPADM

## 2023-10-26 RX ORDER — DOCUSATE SODIUM 100 MG/1
100 CAPSULE, LIQUID FILLED ORAL 2 TIMES DAILY
Status: DISCONTINUED | OUTPATIENT
Start: 2023-10-26 | End: 2023-11-03 | Stop reason: HOSPADM

## 2023-10-26 RX ORDER — ONDANSETRON 2 MG/ML
4 INJECTION INTRAMUSCULAR; INTRAVENOUS ONCE
Status: COMPLETED | OUTPATIENT
Start: 2023-10-26 | End: 2023-10-26

## 2023-10-26 RX ORDER — POLYETHYLENE GLYCOL 3350 17 G/17G
17 POWDER, FOR SOLUTION ORAL DAILY PRN
Status: DISCONTINUED | OUTPATIENT
Start: 2023-10-26 | End: 2023-11-03 | Stop reason: HOSPADM

## 2023-10-26 RX ORDER — ONDANSETRON 4 MG/1
4 TABLET, ORALLY DISINTEGRATING ORAL EVERY 8 HOURS PRN
Status: DISCONTINUED | OUTPATIENT
Start: 2023-10-26 | End: 2023-11-03 | Stop reason: HOSPADM

## 2023-10-26 RX ORDER — MORPHINE SULFATE 4 MG/ML
4 INJECTION, SOLUTION INTRAMUSCULAR; INTRAVENOUS ONCE
Status: DISCONTINUED | OUTPATIENT
Start: 2023-10-26 | End: 2023-10-26

## 2023-10-26 RX ORDER — LEVOTHYROXINE SODIUM 0.12 MG/1
125 TABLET ORAL
Status: DISCONTINUED | OUTPATIENT
Start: 2023-10-27 | End: 2023-11-03 | Stop reason: HOSPADM

## 2023-10-26 RX ORDER — WARFARIN SODIUM 4 MG/1
4 TABLET ORAL
Status: COMPLETED | OUTPATIENT
Start: 2023-10-26 | End: 2023-10-26

## 2023-10-26 RX ADMIN — WARFARIN SODIUM 4 MG: 4 TABLET ORAL at 23:47

## 2023-10-26 RX ADMIN — WATER 60 MG: 1 INJECTION INTRAMUSCULAR; INTRAVENOUS; SUBCUTANEOUS at 15:29

## 2023-10-26 RX ADMIN — ONDANSETRON 4 MG: 2 INJECTION INTRAMUSCULAR; INTRAVENOUS at 14:04

## 2023-10-26 RX ADMIN — IOPAMIDOL 80 ML: 755 INJECTION, SOLUTION INTRAVENOUS at 15:12

## 2023-10-26 RX ADMIN — MORPHINE SULFATE 2 MG: 2 INJECTION, SOLUTION INTRAMUSCULAR; INTRAVENOUS at 14:47

## 2023-10-26 RX ADMIN — ONDANSETRON 4 MG: 2 INJECTION INTRAMUSCULAR; INTRAVENOUS at 20:08

## 2023-10-26 NOTE — PROGRESS NOTES
Warfarin Pharmacy Consult Note    Hal Low is a 80 y.o. female for whom pharmacy has been asked to manage warfarin therapy. Consulting Provider: Dr. Jeanette Howard  Warfarin Indication: Hx of PE  Target INR range: 2.0-3.0   Warfarin dose prior to admission: 4 mg daily  Outpatient warfarin provider: [facility]    Recent Labs     10/26/23  1400   INR 2.41*     Recent Labs     10/26/23  1400   HGB 15.5        Concurrent anticoagulants/antiplatelets: none  Significant warfarin drug-drug interactions: APAP, levothyroxine, methylprednisolone x1 dose    Date INR Warfarin Dose   10/26 2.41 4 mg                                   INR will be monitored routinely until therapeutic INR is achieved. Pharmacy will continue to follow.  Thank you for the consult,   Helena Cohen, PharmD  10/26/2023 7:28 PM

## 2023-10-26 NOTE — ED NOTES
Patient resting in bed no concerns voiced.  Meal tray ordered at this time      Christiana Mullen RN  10/26/23 5574

## 2023-10-26 NOTE — ED NOTES
Patient resting in bed, no concerns voiced at this time will continue to monitor      Evelyn Kuhn RN  10/26/23 8020

## 2023-10-26 NOTE — ED NOTES
Patient to ED via EMS for back and left sided hip pain. Patient lives at home alone uses a walking stick for ambulation. Patient reports increased pain in her lower back that started Monday. Today pain has traveled to her left hip. Patient denies injury.  Patient alert and oriented      Danyelle Zarco RN  10/26/23 9415

## 2023-10-27 ENCOUNTER — APPOINTMENT (OUTPATIENT)
Dept: MRI IMAGING | Age: 88
DRG: 479 | End: 2023-10-27
Payer: MEDICARE

## 2023-10-27 LAB
ANION GAP SERPL CALC-SCNC: 12 MEQ/L (ref 8–16)
BASOPHILS ABSOLUTE: 0 THOU/MM3 (ref 0–0.1)
BASOPHILS NFR BLD AUTO: 0 %
BUN SERPL-MCNC: 16 MG/DL (ref 7–22)
CALCIUM SERPL-MCNC: 9.3 MG/DL (ref 8.5–10.5)
CHLORIDE SERPL-SCNC: 100 MEQ/L (ref 98–111)
CO2 SERPL-SCNC: 27 MEQ/L (ref 23–33)
CREAT SERPL-MCNC: 0.6 MG/DL (ref 0.4–1.2)
DEPRECATED RDW RBC AUTO: 42.3 FL (ref 35–45)
EOSINOPHIL NFR BLD AUTO: 0 %
EOSINOPHILS ABSOLUTE: 0 THOU/MM3 (ref 0–0.4)
ERYTHROCYTE [DISTWIDTH] IN BLOOD BY AUTOMATED COUNT: 12.7 % (ref 11.5–14.5)
GFR SERPL CREATININE-BSD FRML MDRD: > 60 ML/MIN/1.73M2
GLUCOSE SERPL-MCNC: 145 MG/DL (ref 70–108)
HCT VFR BLD AUTO: 45.2 % (ref 37–47)
HGB BLD-MCNC: 14.6 GM/DL (ref 12–16)
IMM GRANULOCYTES # BLD AUTO: 0.03 THOU/MM3 (ref 0–0.07)
IMM GRANULOCYTES NFR BLD AUTO: 0.6 %
INR PPP: 2.26 (ref 0.85–1.13)
LYMPHOCYTES ABSOLUTE: 1.6 THOU/MM3 (ref 1–4.8)
LYMPHOCYTES NFR BLD AUTO: 32.6 %
MCH RBC QN AUTO: 29.4 PG (ref 26–33)
MCHC RBC AUTO-ENTMCNC: 32.3 GM/DL (ref 32.2–35.5)
MCV RBC AUTO: 91.1 FL (ref 81–99)
MONOCYTES ABSOLUTE: 0 THOU/MM3 (ref 0.4–1.3)
MONOCYTES NFR BLD AUTO: 0.4 %
NEUTROPHILS NFR BLD AUTO: 66.4 %
NRBC BLD AUTO-RTO: 0 /100 WBC
OSMOLALITY SERPL CALC.SUM OF ELEC: 281.3 MOSMOL/KG (ref 275–300)
PLATELET # BLD AUTO: 236 THOU/MM3 (ref 130–400)
PMV BLD AUTO: 11.4 FL (ref 9.4–12.4)
POTASSIUM SERPL-SCNC: 4.1 MEQ/L (ref 3.5–5.2)
RBC # BLD AUTO: 4.96 MILL/MM3 (ref 4.2–5.4)
SEGMENTED NEUTROPHILS ABSOLUTE COUNT: 3.3 THOU/MM3 (ref 1.8–7.7)
SODIUM SERPL-SCNC: 139 MEQ/L (ref 135–145)
WBC # BLD AUTO: 4.9 THOU/MM3 (ref 4.8–10.8)

## 2023-10-27 PROCEDURE — 99232 SBSQ HOSP IP/OBS MODERATE 35: CPT | Performed by: HOSPITALIST

## 2023-10-27 PROCEDURE — 6370000000 HC RX 637 (ALT 250 FOR IP): Performed by: HOSPITALIST

## 2023-10-27 PROCEDURE — G0378 HOSPITAL OBSERVATION PER HR: HCPCS

## 2023-10-27 PROCEDURE — 85025 COMPLETE CBC W/AUTO DIFF WBC: CPT

## 2023-10-27 PROCEDURE — 85610 PROTHROMBIN TIME: CPT

## 2023-10-27 PROCEDURE — 72148 MRI LUMBAR SPINE W/O DYE: CPT

## 2023-10-27 PROCEDURE — 80048 BASIC METABOLIC PNL TOTAL CA: CPT

## 2023-10-27 PROCEDURE — 36415 COLL VENOUS BLD VENIPUNCTURE: CPT

## 2023-10-27 PROCEDURE — 72195 MRI PELVIS W/O DYE: CPT

## 2023-10-27 PROCEDURE — 2580000003 HC RX 258: Performed by: HOSPITALIST

## 2023-10-27 RX ORDER — DILTIAZEM HYDROCHLORIDE 180 MG/1
360 CAPSULE, COATED, EXTENDED RELEASE ORAL DAILY
Status: DISCONTINUED | OUTPATIENT
Start: 2023-10-27 | End: 2023-11-03 | Stop reason: HOSPADM

## 2023-10-27 RX ORDER — BISACODYL 10 MG
10 SUPPOSITORY, RECTAL RECTAL DAILY PRN
Status: DISCONTINUED | OUTPATIENT
Start: 2023-10-27 | End: 2023-11-03 | Stop reason: HOSPADM

## 2023-10-27 RX ORDER — ATORVASTATIN CALCIUM 20 MG/1
20 TABLET, FILM COATED ORAL EVERY EVENING
Status: DISCONTINUED | OUTPATIENT
Start: 2023-10-27 | End: 2023-11-03 | Stop reason: HOSPADM

## 2023-10-27 RX ORDER — WARFARIN SODIUM 4 MG/1
4 TABLET ORAL ONCE
Status: COMPLETED | OUTPATIENT
Start: 2023-10-27 | End: 2023-10-27

## 2023-10-27 RX ADMIN — BISACODYL 10 MG: 10 SUPPOSITORY RECTAL at 16:31

## 2023-10-27 RX ADMIN — SODIUM CHLORIDE, PRESERVATIVE FREE 10 ML: 5 INJECTION INTRAVENOUS at 08:40

## 2023-10-27 RX ADMIN — ACETAMINOPHEN 650 MG: 325 TABLET ORAL at 03:15

## 2023-10-27 RX ADMIN — CYCLOBENZAPRINE HYDROCHLORIDE 5 MG: 10 TABLET, FILM COATED ORAL at 16:06

## 2023-10-27 RX ADMIN — ACETAMINOPHEN 650 MG: 325 TABLET ORAL at 16:22

## 2023-10-27 RX ADMIN — SODIUM CHLORIDE, PRESERVATIVE FREE 10 ML: 5 INJECTION INTRAVENOUS at 21:07

## 2023-10-27 RX ADMIN — WARFARIN SODIUM 4 MG: 4 TABLET ORAL at 17:54

## 2023-10-27 RX ADMIN — DOCUSATE SODIUM 100 MG: 100 CAPSULE, LIQUID FILLED ORAL at 21:07

## 2023-10-27 RX ADMIN — CYCLOBENZAPRINE HYDROCHLORIDE 5 MG: 10 TABLET, FILM COATED ORAL at 03:15

## 2023-10-27 RX ADMIN — DOCUSATE SODIUM 100 MG: 100 CAPSULE, LIQUID FILLED ORAL at 08:40

## 2023-10-27 RX ADMIN — ATORVASTATIN CALCIUM 20 MG: 20 TABLET, FILM COATED ORAL at 21:07

## 2023-10-27 ASSESSMENT — PAIN DESCRIPTION - DESCRIPTORS
DESCRIPTORS: ACHING
DESCRIPTORS: TENDER

## 2023-10-27 ASSESSMENT — PAIN DESCRIPTION - LOCATION
LOCATION: BACK
LOCATION: HEAD;BACK
LOCATION: BACK
LOCATION: BACK

## 2023-10-27 ASSESSMENT — PAIN DESCRIPTION - ORIENTATION
ORIENTATION: LOWER

## 2023-10-27 ASSESSMENT — PAIN - FUNCTIONAL ASSESSMENT
PAIN_FUNCTIONAL_ASSESSMENT: PREVENTS OR INTERFERES SOME ACTIVE ACTIVITIES AND ADLS
PAIN_FUNCTIONAL_ASSESSMENT: PREVENTS OR INTERFERES SOME ACTIVE ACTIVITIES AND ADLS

## 2023-10-27 ASSESSMENT — PAIN DESCRIPTION - PAIN TYPE
TYPE: ACUTE PAIN
TYPE: ACUTE PAIN

## 2023-10-27 ASSESSMENT — PAIN SCALES - GENERAL
PAINLEVEL_OUTOF10: 4
PAINLEVEL_OUTOF10: 4
PAINLEVEL_OUTOF10: 5
PAINLEVEL_OUTOF10: 2
PAINLEVEL_OUTOF10: 5

## 2023-10-27 ASSESSMENT — PAIN DESCRIPTION - FREQUENCY
FREQUENCY: CONTINUOUS
FREQUENCY: CONTINUOUS

## 2023-10-27 ASSESSMENT — PAIN DESCRIPTION - ONSET
ONSET: ON-GOING
ONSET: ON-GOING

## 2023-10-27 ASSESSMENT — PAIN DESCRIPTION - DIRECTION: RADIATING_TOWARDS: LEFT LEG

## 2023-10-27 NOTE — PROGRESS NOTES
Warfarin Pharmacy Consult Note    Warfarin Indication: Hx of PE  Target INR: 2.0-3.0  Dose prior to admission: 4 mg daily    Recent Labs     10/26/23  1400 10/27/23  0519   INR 2.41* 2.26*     Recent Labs     10/26/23  1400 10/27/23  0519   HGB 15.5 14.6    236     Concurrent anticoagulants/antiplatelets: none  Significant warfarin drug-drug interactions: levothyroxine     Date INR Warfarin Dose   10/26/23 2.41 4 mg    10/27/23 2.26  4 mg                                                Monitoring:                   INR will be monitored daily until therapeutic INR is achieved.     **Please contact pharmacy for discharge instructions when indicated**    Benson Powers PharmD, BCPS  10/27/2023  2:09 PM

## 2023-10-27 NOTE — PLAN OF CARE
Problem: Discharge Planning  Goal: Discharge to home or other facility with appropriate resources  Outcome: Progressing  Flowsheets  Taken 10/27/2023 0627  Discharge to home or other facility with appropriate resources:   Identify barriers to discharge with patient and caregiver   Arrange for needed discharge resources and transportation as appropriate   Identify discharge learning needs (meds, wound care, etc)  Taken 10/27/2023 0626  Discharge to home or other facility with appropriate resources: Identify barriers to discharge with patient and caregiver  Note: Patient from home alone . DCP pending. To consult with CM/SW. Problem: Safety - Adult  Goal: Free from fall injury  Outcome: Progressing  Flowsheets (Taken 10/27/2023 0627)  Free From Fall Injury: Instruct family/caregiver on patient safety  Note: Safe environment maintained. Bedside table & call light in reach. Uses call light appropriately when needing assistance. Problem: ABCDS Injury Assessment  Goal: Absence of physical injury  Outcome: Progressing     Problem: Skin/Tissue Integrity  Goal: Absence of new skin breakdown  Description: 1. Monitor for areas of redness and/or skin breakdown  2. Assess vascular access sites hourly  3. Every 4-6 hours minimum:  Change oxygen saturation probe site  4. Every 4-6 hours:  If on nasal continuous positive airway pressure, respiratory therapy assess nares and determine need for appliance change or resting period. Outcome: Progressing   Care plan reviewed with patient. Patient  verbalize understanding of the plan of care and contribute to goal setting.

## 2023-10-27 NOTE — CARE COORDINATION
Case Management Assessment  Initial Evaluation    Date/Time of Evaluation: 10/27/2023 3:30 PM  Assessment Completed by: Larry Bearden RN    If patient is discharged prior to next notation, then this note serves as note for discharge by case management. Patient Name: Terrea Kehr                   YOB: 1934  Diagnosis: Intractable back pain [M54.9]  Intractable low back pain [M54.59]  Anticoagulated on Coumadin [Z79.01]  Acute exacerbation of chronic low back pain [M54.50, G89.29]                   Date / Time: 10/26/2023  1:28 PM  Location: [facility]04/004-     Patient Admission Status: Observation   Readmission Risk Low 0-14, Mod 15-19), High > 20: No data recorded  Current PCP: LISA Garcia CNP  PCP verified by CM? Yes    Chart Reviewed: Yes      History Provided by: Patient  Patient Orientation: Alert and Oriented    Patient Cognition: Alert    Hospitalization in the last 30 days (Readmission):  No    If yes, Readmission Assessment in  Navigator will be completed. Advance Directives:      Code Status: Full Code   Patient's Primary Decision Maker is: Legal Next of Kin    Primary Decision Maker: Eleazar Nett - [phone]    Secondary Decision Maker: Juanita Blanchard Child - [phone]    Discharge Planning:    Patient lives with: Alone Type of Home: House  Primary Care Giver: Self  Patient Support Systems include: Children   Current Financial resources: Medicare  Current community resources: None  Current services prior to admission: None, Durable Medical Equipment            Current DME: Other (Comment) (Walking stick)            Type of Home Care services:  None    ADLS  Prior functional level: Independent in ADLs/IADLs  Current functional level: Assistance with the following:, Bathing, Dressing, Mobility    Family can provide assistance at DC:  Yes  Would you like Case Management to discuss the discharge plan with any other family members/significant others, and if so, signal intensity superficial to the left greater trochanter which may represent tendinitis versus bursitis. 4. There aren't areas of abnormal signal intensity in the left side of the sacrum and within the L3 vertebral body. 5. The patient is status post hysterectomy. The Plan for Transition of Care is related to the following treatment goals of Intractable back pain [M54.9]  Intractable low back pain [M54.59]  Anticoagulated on Coumadin [Z79.01]  Acute exacerbation of chronic low back pain [M54.50, G89.29]    Patient Goals/Plan/Treatment Preferences: From home alone. Awaiting orthopedic surgery plan for further input on discharge planning. Transportation/Food Security/Housekeeping Addressed: No issues identified.      Inna Wong RN  Case Management Department

## 2023-10-27 NOTE — PROGRESS NOTES
Pharmacy Medication History Note      List of current medications patient is taking is complete. Source of information: patient and fill history    Changes made to medication list:  Medications removed (include reason, ex. therapy complete or physician discontinued):  none    Medications added/doses adjusted:  none    Other notes (ex. Recent course of antibiotics, Coumadin dosing):  Patient will only take name brand Synthroid - daughter to bring home supply  Denies use of other OTC or herbal medications.       Allergies reviewed      Electronically signed by Sanjuana Nunez [facility] on 10/26/2023 at 8:02 PM

## 2023-10-27 NOTE — PALLIATIVE CARE
Initial Evaluation        Patient:   Yasmine Trejo  YOB: 1934  Age:  80 y.o. Room:  [facility]  MRN:  [id number]   Acct: [de-identified]    Date of Admission:  10/26/2023  1:28 PM  Date of Service:  10/27/2023  Completed By:  Lisa Tian RN                 Reason for Palliative Care Evaluation:-               [x] Code Status Discussion              [] Goals of Care              [] Pain/Symptom Management               [] Emotional Support              [] Other:                    Current Issues:-   [x]  Pain  []  Fatigue  []  Nausea  []  Anxiety  []  Depression  []  Shortness of Breath  []  Constipation  []  Appetite  []  Other:              Advance Directives:-  none  [] [address] DNR Form  [] Living Will  [] Medical POA              Current Code Status:-     [x] Full Resuscitation  [] DNR-Comfort Care-Arrest  [] DNR-Comfort Care       [] Limited Resuscitation             [] No CPR            [] No shock            [] No ET intubation/reintubation            [] No resuscitative medications            [] Other limitation:  Code status changed to LIMITED x4 after consultation. Palliative Performance Status:-      [] 100%  Full ambulation; normal activity and work; no evidence of disease; able to do own self care; normal intake; fully conscious     [] 90%   Full ambulation; normal activity and work; some evidence of disease; able to do own self care; normal intake; fully conscious    [] 80%   Full ambulation; normal activity with effort; some evidence of disease; able to do own self care; normal or reduced intake; fully conscious    [x] 70%  Ambulation reduced; unable to perform normal job/work; significant  disease; able to do own self care; normal or reduced intake; fully conscious    [] 60%  Ambulation reduced; Significant disease;Can't do hobbies/housework; intake normal or reduced; occasional assist; LOC full/confusion    [] 50%  Mainly sit/lie;  Extensive disease; Can't do any

## 2023-10-27 NOTE — PLAN OF CARE
Problem: Discharge Planning  Goal: Discharge to home or other facility with appropriate resources  10/27/2023 1804 by Charles Lara LPN  Outcome: Progressing  Flowsheets (Taken 10/27/2023 1804)  Discharge to home or other facility with appropriate resources:   Identify barriers to discharge with patient and caregiver   Identify discharge learning needs (meds, wound care, etc)     Problem: Safety - Adult  Goal: Free from fall injury  10/27/2023 1804 by Charles Lara LPN  Outcome: Progressing  Flowsheets (Taken 10/27/2023 1804)  Free From Fall Injury: Instruct family/caregiver on patient safety     Problem: ABCDS Injury Assessment  Goal: Absence of physical injury  10/27/2023 1804 by Charles Lara LPN  Outcome: Progressing  Flowsheets (Taken 10/27/2023 1804)  Absence of Physical Injury: Implement safety measures based on patient assessment     Problem: Skin/Tissue Integrity  Goal: Absence of new skin breakdown  Description: 1. Monitor for areas of redness and/or skin breakdown  2. Assess vascular access sites hourly  3. Every 4-6 hours minimum:  Change oxygen saturation probe site  4. Every 4-6 hours:  If on nasal continuous positive airway pressure, respiratory therapy assess nares and determine need for appliance change or resting period.   10/27/2023 1804 by Charles Lara LPN  Outcome: Progressing     Problem: Pain  Goal: Verbalizes/displays adequate comfort level or baseline comfort level  Outcome: Progressing  Flowsheets (Taken 10/27/2023 1804)  Verbalizes/displays adequate comfort level or baseline comfort level: Encourage patient to monitor pain and request assistance     Problem: Musculoskeletal - Adult  Goal: Return mobility to safest level of function  Outcome: Progressing  Flowsheets (Taken 10/27/2023 1804)  Return Mobility to Safest Level of Function:   Assess patient stability and activity tolerance for standing, transferring and ambulating with or without assistive devices   Assist with transfers and

## 2023-10-27 NOTE — PROGRESS NOTES
Sh[facility]nam  OCCUPATIONAL THERAPY MISSED TREATMENT NOTE  [facility] ORTHOPEDICS 7K  7K-04/004-A      Date: 10/27/2023  Patient Name: Maria G Wynn        CSN: [id number]   : 3/1/1934  (80 y.o.)  Gender: female                REASON FOR MISSED TREATMENT:  RN approved session and was in room with patient upon OT arrival, however RN then stated patient has not had her MRIs for her pelvis/lumbar region. OT to hold and check back as able and time allows.

## 2023-10-28 LAB
ANION GAP SERPL CALC-SCNC: 10 MEQ/L (ref 8–16)
BASOPHILS ABSOLUTE: 0 THOU/MM3 (ref 0–0.1)
BASOPHILS NFR BLD AUTO: 0.2 %
BUN SERPL-MCNC: 28 MG/DL (ref 7–22)
CALCIUM SERPL-MCNC: 9.2 MG/DL (ref 8.5–10.5)
CHLORIDE SERPL-SCNC: 99 MEQ/L (ref 98–111)
CO2 SERPL-SCNC: 30 MEQ/L (ref 23–33)
CREAT SERPL-MCNC: 0.7 MG/DL (ref 0.4–1.2)
DEPRECATED RDW RBC AUTO: 42.9 FL (ref 35–45)
EOSINOPHIL NFR BLD AUTO: 0 %
EOSINOPHILS ABSOLUTE: 0 THOU/MM3 (ref 0–0.4)
ERYTHROCYTE [DISTWIDTH] IN BLOOD BY AUTOMATED COUNT: 12.9 % (ref 11.5–14.5)
GFR SERPL CREATININE-BSD FRML MDRD: > 60 ML/MIN/1.73M2
GLUCOSE SERPL-MCNC: 115 MG/DL (ref 70–108)
HCT VFR BLD AUTO: 43.8 % (ref 37–47)
HGB BLD-MCNC: 14.2 GM/DL (ref 12–16)
IMM GRANULOCYTES # BLD AUTO: 0.06 THOU/MM3 (ref 0–0.07)
IMM GRANULOCYTES NFR BLD AUTO: 0.4 %
INR PPP: 2.38 (ref 0.85–1.13)
LYMPHOCYTES ABSOLUTE: 3.8 THOU/MM3 (ref 1–4.8)
LYMPHOCYTES NFR BLD AUTO: 24.3 %
MCH RBC QN AUTO: 29.6 PG (ref 26–33)
MCHC RBC AUTO-ENTMCNC: 32.4 GM/DL (ref 32.2–35.5)
MCV RBC AUTO: 91.3 FL (ref 81–99)
MONOCYTES ABSOLUTE: 0.7 THOU/MM3 (ref 0.4–1.3)
MONOCYTES NFR BLD AUTO: 4.2 %
NEUTROPHILS NFR BLD AUTO: 70.9 %
NRBC BLD AUTO-RTO: 0 /100 WBC
PLATELET # BLD AUTO: 230 THOU/MM3 (ref 130–400)
PMV BLD AUTO: 11.2 FL (ref 9.4–12.4)
POTASSIUM SERPL-SCNC: 4 MEQ/L (ref 3.5–5.2)
RBC # BLD AUTO: 4.8 MILL/MM3 (ref 4.2–5.4)
SEGMENTED NEUTROPHILS ABSOLUTE COUNT: 11 THOU/MM3 (ref 1.8–7.7)
SODIUM SERPL-SCNC: 139 MEQ/L (ref 135–145)
WBC # BLD AUTO: 15.5 THOU/MM3 (ref 4.8–10.8)

## 2023-10-28 PROCEDURE — 6370000000 HC RX 637 (ALT 250 FOR IP): Performed by: PHYSICIAN ASSISTANT

## 2023-10-28 PROCEDURE — G0378 HOSPITAL OBSERVATION PER HR: HCPCS

## 2023-10-28 PROCEDURE — 85610 PROTHROMBIN TIME: CPT

## 2023-10-28 PROCEDURE — 36415 COLL VENOUS BLD VENIPUNCTURE: CPT

## 2023-10-28 PROCEDURE — 97116 GAIT TRAINING THERAPY: CPT

## 2023-10-28 PROCEDURE — 97162 PT EVAL MOD COMPLEX 30 MIN: CPT

## 2023-10-28 PROCEDURE — 99232 SBSQ HOSP IP/OBS MODERATE 35: CPT | Performed by: HOSPITALIST

## 2023-10-28 PROCEDURE — 6370000000 HC RX 637 (ALT 250 FOR IP): Performed by: HOSPITALIST

## 2023-10-28 PROCEDURE — 80048 BASIC METABOLIC PNL TOTAL CA: CPT

## 2023-10-28 PROCEDURE — 85025 COMPLETE CBC W/AUTO DIFF WBC: CPT

## 2023-10-28 PROCEDURE — 2580000003 HC RX 258: Performed by: HOSPITALIST

## 2023-10-28 RX ORDER — LIDOCAINE 4 G/G
2 PATCH TOPICAL DAILY
Status: DISCONTINUED | OUTPATIENT
Start: 2023-10-28 | End: 2023-11-03 | Stop reason: HOSPADM

## 2023-10-28 RX ADMIN — SODIUM CHLORIDE, PRESERVATIVE FREE 10 ML: 5 INJECTION INTRAVENOUS at 09:18

## 2023-10-28 RX ADMIN — ATORVASTATIN CALCIUM 20 MG: 20 TABLET, FILM COATED ORAL at 17:22

## 2023-10-28 RX ADMIN — POLYETHYLENE GLYCOL 3350 17 G: 17 POWDER, FOR SOLUTION ORAL at 09:17

## 2023-10-28 RX ADMIN — DOCUSATE SODIUM 100 MG: 100 CAPSULE, LIQUID FILLED ORAL at 09:17

## 2023-10-28 RX ADMIN — DILTIAZEM HYDROCHLORIDE 360 MG: 180 CAPSULE, COATED, EXTENDED RELEASE ORAL at 09:18

## 2023-10-28 RX ADMIN — DOCUSATE SODIUM 100 MG: 100 CAPSULE, LIQUID FILLED ORAL at 20:02

## 2023-10-28 RX ADMIN — SODIUM CHLORIDE, PRESERVATIVE FREE 10 ML: 5 INJECTION INTRAVENOUS at 20:02

## 2023-10-28 ASSESSMENT — PAIN SCALES - GENERAL
PAINLEVEL_OUTOF10: 5

## 2023-10-28 ASSESSMENT — ENCOUNTER SYMPTOMS
CONSTIPATION: 0
SHORTNESS OF BREATH: 0
CHEST TIGHTNESS: 0
COUGH: 0
VOMITING: 0
BACK PAIN: 1
NAUSEA: 0
ABDOMINAL PAIN: 0

## 2023-10-28 ASSESSMENT — PAIN DESCRIPTION - ONSET: ONSET: ON-GOING

## 2023-10-28 ASSESSMENT — PAIN DESCRIPTION - LOCATION
LOCATION: BACK;HIP
LOCATION: HIP

## 2023-10-28 ASSESSMENT — PAIN DESCRIPTION - ORIENTATION
ORIENTATION: OUTER
ORIENTATION: LEFT

## 2023-10-28 ASSESSMENT — PAIN DESCRIPTION - FREQUENCY: FREQUENCY: CONTINUOUS

## 2023-10-28 ASSESSMENT — PAIN DESCRIPTION - PAIN TYPE: TYPE: ACUTE PAIN

## 2023-10-28 ASSESSMENT — PAIN DESCRIPTION - DESCRIPTORS
DESCRIPTORS: ACHING
DESCRIPTORS: ACHING;POUNDING

## 2023-10-28 ASSESSMENT — PAIN - FUNCTIONAL ASSESSMENT: PAIN_FUNCTIONAL_ASSESSMENT: PREVENTS OR INTERFERES SOME ACTIVE ACTIVITIES AND ADLS

## 2023-10-28 NOTE — CONSULTS
Orthopedic Spine Consult  Department of Orthopedic Surgery  Attending: Dr. Remigio Gomez consult to Orthopedic Surgery  Consult performed by: Darrin Holland PA-C  Consult ordered by: Shruthi Luna MD          Chief Complaint: Low back pain and left hip pain    HPI: Tawana Calabrese is an 80 y.o. female who we are asked to consult on for findings of an L3 compression fracture as well as significant spinal canal stenosis of her lumbar spine. She does endorse an acute on chronic low back pain and left hip pain. She endorses a chronic history of a left lower extremity decree sensation to the lateral hip, anterolateral thigh and shin into her foot. This is not changed but she has been dealing with a significant stabbing low back pain over the past week without any obvious trauma or inciting events. She was worked up with an MRI of the lumbar spine which demonstrated acute L3 compression fracture along with significant spinal canal stenosis from L3-L5. She rates her pain worsened standing as well as walking. This does limit her ability to stand. She has been utilizing Tylenol as needed for pain. She does not wish to undergo any type of narcotic due to history of constipation in the past.  She has a small bowel movement yesterday. We discussed results of her MRI. She has significant spinal canal stenosis from L3-L5 which is most likely contributing to her chronic left lower extremity radiculopathy but also demonstrating a acute L3 compression fracture which I do feel that she would be a candidate for an augmentation as well as possible epidural injection. She is agreeable to attempt pain patches for pain as well as agreeable to a pain management evaluation. There has been no changes to her bowel or bladder control.     Assessment:   LBP 2/2 acute L3 compression fx  Lumbar spinal stenosis with radiculopathy    Plan:   Monitor labs  Recommending holding blood thinners for possible L3 augmentation and ARIAS demineralization. There are no gross abnormalities within the spinal canal. On the axial images, there are no fractures. There is no severe spinal canal stenosis at any level. There are no suspicious findings in the visualized aspects of the retroperitoneum and paraspinal soft tissues. No acute findings in the lumbar spine. **This report has been created using voice recognition software. It may contain minor errors which are inherent in voice recognition technology. ** Final report electronically signed by Dr. Rahat Amaya on 10/26/2023 3:41 PM          Electronically signed by Jae Baker PA-C on 10/28/23 at 7:51 AM EDT

## 2023-10-28 NOTE — PROGRESS NOTES
effort. Clear to auscultation, bilaterally without Rales/Wheezes/Rhonchi. Cardiovascular: Regular rate and rhythm with normal S1/S2 without murmurs, rubs or gallops. Abdomen: Soft, non-tender, non-distended with normal bowel sounds. Extremities: no pedal edema  Skin:  no rashes    Labs:   Recent Labs     10/26/23  1400 10/27/23  0519 10/28/23  0519   WBC 10.1 4.9 15.5*   HGB 15.5 14.6 14.2   HCT 47.9* 45.2 43.8    236 230       Recent Labs     10/26/23  1400 10/27/23  0519 10/28/23  0519    139 139   K 4.0 4.1 4.0   CL 98 100 99   CO2 27 27 30   BUN 13 16 28*   CREATININE 0.6 0.6 0.7   CALCIUM 9.7 9.3 9.2       Recent Labs     10/26/23  1400   AST 23   ALT 14   BILITOT 0.8   ALKPHOS 128*       Recent Labs     10/26/23  1400 10/27/23  0519 10/28/23  0519   INR 2.41* 2.26* 2.38*       Recent Labs     10/26/23  1400   CKTOTAL 49       No results for input(s): \"PROCAL\" in the last 72 hours. Microbiology:      Urinalysis:      Lab Results   Component Value Date/Time    NITRU NEGATIVE 10/26/2023 04:00 PM    WBCUA 0-2 10/26/2023 04:00 PM    BACTERIA NONE SEEN 10/26/2023 04:00 PM    RBCUA 0-2 10/26/2023 04:00 PM    BLOODU TRACE 10/26/2023 04:00 PM    GLUCOSEU NEGATIVE 10/26/2023 04:00 PM       Radiology:  MRI PELVIS WO CONTRAST    Result Date: 10/27/2023  PROCEDURE: MRI PELVIS WO CONTRAST CLINICAL INFORMATION inability to walk, severe lower back/left hip pain, numbness. COMPARISON: CT scan of the pelvis dated 26th of April 2023. Darol Cocker TECHNIQUE: A noncontrast MRI scan was carried out through the pelvis, attention left hip. Darol Cocker FINDINGS: There is degenerative change involving the hip and sacroiliac joints bilaterally. There is no fracture, avascular necrosis or other bony abnormality involving the left hip joint. There is no significant hip effusion. The labrum is grossly intact. There is slightly increased signal intensity superficial to the left greater trochanter.  This could represent an tendinitis versus bursitis. There are areas of abnormal signal intensity in the sacrum on the left side and within the L3 vertebral body. The patient is status post hysterectomy. There is diverticulosis involving the colon. There is no free fluid. There is no adenopathy. 1. Degenerative change involving the hip and sacroiliac joints bilaterally. 2. No evidence of fracture, avascular necrosis or other bony abnormality involving the left hip. 3. Slightly increased signal intensity superficial to the left greater trochanter which may represent tendinitis versus bursitis. 4. There aren't areas of abnormal signal intensity in the left side of the sacrum and within the L3 vertebral body. 5. The patient is status post hysterectomy. **This report has been created using voice recognition software. It may contain minor errors which are inherent in voice recognition technology. ** Final report electronically signed by DR Jonah Bermudez on 10/27/2023 10:45 AM    MRI LUMBAR SPINE WO CONTRAST    Result Date: 10/27/2023  PROCEDURE: MRI LUMBAR SPINE WO CONTRAST CLINICAL INFORMATION: severe back pain, inability to ambulate. No known injury. COMPARISON: Lumbar spine CT 10/26/2023. TECHNIQUE: Sagittal and axial T1 and T2-weighted images were obtained through the lumbar spine. FINDINGS: There is a slight retrolisthesis of L2 on L3 and L3 on L4. There is an acute fracture of the inferior endplate of L3. There is edema on the STIR sequence. There are very subtle fracture line seen on the T2-weighted images. The fracture involves the inferior right aspect of the endplate. Height loss is mild, 10%. There are no other sites of bone marrow edema. There is a benign-appearing hemangioma in T12. There is loss of disc height at the thoracolumbar junction. No pars defects are noted. There is disc desiccation throughout.  The distal spinal cord, conus medullaris and cauda equina are normal. There are no gross abnormalities in the visualized aspects of the distal

## 2023-10-28 NOTE — PROGRESS NOTES
10/28/23 1549   Encounter Summary   Encounter Overview/Reason  Initial Encounter   Service Provided For: Patient   Referral/Consult From: [address] Family members   Last Encounter  10/28/23   Complexity of Encounter Low   Begin Time 1500   End Time  1530   Total Time Calculated 30 min   Spiritual/Emotional needs   Type Spiritual Support   Assessment/Intervention/Outcome   Assessment Calm; Hopeful   Intervention Active listening;Explored/Affirmed feelings, thoughts, concerns   Outcome Comfort;Receptive     This is a spiritual health visit as a part of rounds.  support happened over multiple visits on 10/28. Patient was sitting up in her chair each time. Patient shared about her hospital stay, her hope in her next steps, and the optimistic life outlook that she learned from her mother.  provided empathic listening and information regarding  services and availability.  team will remain available to support patient/family, PRN. [address] [place] [address]. [address] [address]  [phone]

## 2023-10-28 NOTE — PLAN OF CARE
Problem: Discharge Planning  Goal: Discharge to home or other facility with appropriate resources  Outcome: Progressing  Flowsheets (Taken 10/27/2023 2239 by Jennifer Espinoza, ONESIMO)  Discharge to home or other facility with appropriate resources:   Identify barriers to discharge with patient and caregiver   Arrange for needed discharge resources and transportation as appropriate   Identify discharge learning needs (meds, wound care, etc)     Problem: Safety - Adult  Goal: Free from fall injury  Outcome: Progressing  Flowsheets (Taken 10/27/2023 2239 by Jennifer Espinoza, RN)  Free From Fall Injury: Instruct family/caregiver on patient safety     Problem: ABCDS Injury Assessment  Goal: Absence of physical injury  Outcome: Progressing  Flowsheets (Taken 10/27/2023 2239 by Jennifer Espinoza, RN)  Absence of Physical Injury: Implement safety measures based on patient assessment     Problem: Skin/Tissue Integrity  Goal: Absence of new skin breakdown  Description: 1. Monitor for areas of redness and/or skin breakdown  2. Assess vascular access sites hourly  3. Every 4-6 hours minimum:  Change oxygen saturation probe site  4. Every 4-6 hours:  If on nasal continuous positive airway pressure, respiratory therapy assess nares and determine need for appliance change or resting period. Outcome: Progressing  Note: No new skin breakdown noted. Turns self.       Problem: Pain  Goal: Verbalizes/displays adequate comfort level or baseline comfort level  Outcome: Progressing  Flowsheets (Taken 10/27/2023 2239 by Jennifer Espinoza RN)  Verbalizes/displays adequate comfort level or baseline comfort level:   Encourage patient to monitor pain and request assistance   Assess pain using appropriate pain scale   Administer analgesics based on type and severity of pain and evaluate response     Problem: Musculoskeletal - Adult  Goal: Return mobility to safest level of function  Outcome: Progressing  Flowsheets (Taken 10/27/2023 2239 by

## 2023-10-28 NOTE — PLAN OF CARE
Problem: Discharge Planning  Goal: Discharge to home or other facility with appropriate resources  10/27/2023 2239 by Roxanne Runner, RN  Outcome: Progressing  Flowsheets (Taken 10/27/2023 2239)  Discharge to home or other facility with appropriate resources:   Identify barriers to discharge with patient and caregiver   Arrange for needed discharge resources and transportation as appropriate   Identify discharge learning needs (meds, wound care, etc)  10/27/2023 1804 by Bashir Scanlon LPN  Outcome: Progressing  Flowsheets (Taken 10/27/2023 1804)  Discharge to home or other facility with appropriate resources:   Identify barriers to discharge with patient and caregiver   Identify discharge learning needs (meds, wound care, etc)     Problem: Safety - Adult  Goal: Free from fall injury  10/27/2023 2239 by Roxanne Runner, RN  Outcome: Progressing  Flowsheets  Taken 10/27/2023 2239  Free From Fall Injury: Instruct family/caregiver on patient safety  Taken 10/27/2023 2139  Free From Fall Injury: Instruct family/caregiver on patient safety  10/27/2023 1804 by Bashir Scanlon LPN  Outcome: Progressing  Flowsheets (Taken 10/27/2023 1804)  Free From Fall Injury: Instruct family/caregiver on patient safety     Problem: ABCDS Injury Assessment  Goal: Absence of physical injury  10/27/2023 2239 by Roxanne Runner, RN  Outcome: Progressing  Flowsheets  Taken 10/27/2023 2239  Absence of Physical Injury: Implement safety measures based on patient assessment  Taken 10/27/2023 2139  Absence of Physical Injury: Implement safety measures based on patient assessment  10/27/2023 1804 by Bashir Scanlon LPN  Outcome: Progressing  Flowsheets (Taken 10/27/2023 1804)  Absence of Physical Injury: Implement safety measures based on patient assessment     Problem: Skin/Tissue Integrity  Goal: Absence of new skin breakdown  Description: 1. Monitor for areas of redness and/or skin breakdown  2. Assess vascular access sites hourly  3.   Every 4-6 hours

## 2023-10-28 NOTE — PROGRESS NOTES
[address]   INPATIENT PHYSICAL THERAPY  EVALUATION  [facility] ORTHOPEDICS 7K - 7K-04/004-A    Time In: 5917  Time Out: 5699  Timed Code Treatment Minutes: 8 Minutes  Minutes: 26        Date: 10/28/2023  Patient Name: Ashley Geiger,  Gender:  female        MRN: [id number]  : 3/1/1934  (80 y.o.)      Referring Practitioner: Dimitrios Tony MD  Diagnosis: Intractable back pain  Additional Pertinent Hx: Per EMR:The patient is a 80 y.o. female who presents with complaints of lower back pain that radiates to left hip, associated with numbness of her left leg and inability to ambulate. Patient is accompanied by her daughter. She reports a long standing history of back pain but is usually able to treat this at home with Ice/heat and tylenol. Patient denies any trauma, fall or injury. She states the pain was sudden in onset and very severe that she required EMS to bring her in. She denies any loss of yovani or bladder control but admits to recent constipation and needing to use suppositories over the last few days. MRI of L spine shows acute fracture of the inferior endplate of L3. The height loss is 10%. This is most likely pathologic secondary to underlying osteoporosis. Restrictions/Precautions:  Restrictions/Precautions: General Precautions, Up as Tolerated  Position Activity Restriction  Spinal Precautions: No Bending, No Lifting, No Twisting  Other position/activity restrictions: L3 fracture    Subjective:  Chart Reviewed: Yes  Patient assessed for rehabilitation services?: Yes  Family / Caregiver Present: Yes (Dtr)  Subjective: RN approved session reporting patient was up in bathroom trying to have a BM. RN reports that patient plans to see pain management on Monday 10-, but okay to see patient and ambulate. Patient pleasant and agreeable to session. Dtr present and very supportive.     General:  Overall Orientation Status: Within Functional Limits  Vision: Within Functional Limits  Hearing:

## 2023-10-28 NOTE — PROGRESS NOTES
Patient daughter brought in home medications this am. Patient took a dose of her synthroid that she \"missed\" yesterday. Discussed order that she does not take Saturday and Sunday. Patient states she will not take Sunday and will decide again on Monday.

## 2023-10-29 LAB
ANION GAP SERPL CALC-SCNC: 10 MEQ/L (ref 8–16)
BASOPHILS ABSOLUTE: 0.1 THOU/MM3 (ref 0–0.1)
BASOPHILS NFR BLD AUTO: 0.4 %
BUN SERPL-MCNC: 24 MG/DL (ref 7–22)
CALCIUM SERPL-MCNC: 8.6 MG/DL (ref 8.5–10.5)
CHLORIDE SERPL-SCNC: 102 MEQ/L (ref 98–111)
CO2 SERPL-SCNC: 29 MEQ/L (ref 23–33)
CREAT SERPL-MCNC: 0.6 MG/DL (ref 0.4–1.2)
DEPRECATED RDW RBC AUTO: 43.2 FL (ref 35–45)
EOSINOPHIL NFR BLD AUTO: 0.5 %
EOSINOPHILS ABSOLUTE: 0.1 THOU/MM3 (ref 0–0.4)
ERYTHROCYTE [DISTWIDTH] IN BLOOD BY AUTOMATED COUNT: 13 % (ref 11.5–14.5)
GFR SERPL CREATININE-BSD FRML MDRD: > 60 ML/MIN/1.73M2
GLUCOSE SERPL-MCNC: 91 MG/DL (ref 70–108)
HCT VFR BLD AUTO: 41.2 % (ref 37–47)
HGB BLD-MCNC: 13.2 GM/DL (ref 12–16)
IMM GRANULOCYTES # BLD AUTO: 0.05 THOU/MM3 (ref 0–0.07)
IMM GRANULOCYTES NFR BLD AUTO: 0.4 %
INR PPP: 2.37 (ref 0.85–1.13)
LYMPHOCYTES ABSOLUTE: 5.2 THOU/MM3 (ref 1–4.8)
LYMPHOCYTES NFR BLD AUTO: 41.2 %
MCH RBC QN AUTO: 29.1 PG (ref 26–33)
MCHC RBC AUTO-ENTMCNC: 32 GM/DL (ref 32.2–35.5)
MCV RBC AUTO: 90.9 FL (ref 81–99)
MONOCYTES ABSOLUTE: 0.6 THOU/MM3 (ref 0.4–1.3)
MONOCYTES NFR BLD AUTO: 5 %
NEUTROPHILS NFR BLD AUTO: 52.5 %
NRBC BLD AUTO-RTO: 0 /100 WBC
PLATELET # BLD AUTO: 234 THOU/MM3 (ref 130–400)
PMV BLD AUTO: 11.3 FL (ref 9.4–12.4)
POTASSIUM SERPL-SCNC: 4 MEQ/L (ref 3.5–5.2)
RBC # BLD AUTO: 4.53 MILL/MM3 (ref 4.2–5.4)
SCAN OF BLOOD SMEAR: NORMAL
SEGMENTED NEUTROPHILS ABSOLUTE COUNT: 6.6 THOU/MM3 (ref 1.8–7.7)
SODIUM SERPL-SCNC: 141 MEQ/L (ref 135–145)
WBC # BLD AUTO: 12.5 THOU/MM3 (ref 4.8–10.8)

## 2023-10-29 PROCEDURE — 99232 SBSQ HOSP IP/OBS MODERATE 35: CPT | Performed by: HOSPITALIST

## 2023-10-29 PROCEDURE — 97165 OT EVAL LOW COMPLEX 30 MIN: CPT

## 2023-10-29 PROCEDURE — 2580000003 HC RX 258: Performed by: HOSPITALIST

## 2023-10-29 PROCEDURE — 6370000000 HC RX 637 (ALT 250 FOR IP): Performed by: HOSPITALIST

## 2023-10-29 PROCEDURE — G0378 HOSPITAL OBSERVATION PER HR: HCPCS

## 2023-10-29 PROCEDURE — 85025 COMPLETE CBC W/AUTO DIFF WBC: CPT

## 2023-10-29 PROCEDURE — 6370000000 HC RX 637 (ALT 250 FOR IP): Performed by: PHYSICIAN ASSISTANT

## 2023-10-29 PROCEDURE — 36415 COLL VENOUS BLD VENIPUNCTURE: CPT

## 2023-10-29 PROCEDURE — 85610 PROTHROMBIN TIME: CPT

## 2023-10-29 PROCEDURE — 80048 BASIC METABOLIC PNL TOTAL CA: CPT

## 2023-10-29 PROCEDURE — 97535 SELF CARE MNGMENT TRAINING: CPT

## 2023-10-29 RX ADMIN — SODIUM CHLORIDE, PRESERVATIVE FREE 10 ML: 5 INJECTION INTRAVENOUS at 20:16

## 2023-10-29 RX ADMIN — DILTIAZEM HYDROCHLORIDE 360 MG: 180 CAPSULE, COATED, EXTENDED RELEASE ORAL at 08:30

## 2023-10-29 RX ADMIN — ACETAMINOPHEN 650 MG: 325 TABLET ORAL at 08:29

## 2023-10-29 RX ADMIN — SODIUM CHLORIDE, PRESERVATIVE FREE 10 ML: 5 INJECTION INTRAVENOUS at 08:30

## 2023-10-29 RX ADMIN — DOCUSATE SODIUM 100 MG: 100 CAPSULE, LIQUID FILLED ORAL at 20:16

## 2023-10-29 RX ADMIN — DOCUSATE SODIUM 100 MG: 100 CAPSULE, LIQUID FILLED ORAL at 08:30

## 2023-10-29 RX ADMIN — ATORVASTATIN CALCIUM 20 MG: 20 TABLET, FILM COATED ORAL at 17:35

## 2023-10-29 ASSESSMENT — PAIN SCALES - GENERAL
PAINLEVEL_OUTOF10: 3
PAINLEVEL_OUTOF10: 0

## 2023-10-29 NOTE — PLAN OF CARE
Problem: Discharge Planning  Goal: Discharge to home or other facility with appropriate resources  10/28/2023 2213 by Tj Fortune RN  Flowsheets (Taken 10/28/2023 2213)  Discharge to home or other facility with appropriate resources: Identify barriers to discharge with patient and caregiver     Problem: Safety - Adult  Goal: Free from fall injury  10/28/2023 2213 by Tj Fortune [facility]  [facility] (Taken 10/28/2023 2213)  Free From Fall Injury: Instruct family/caregiver on patient safety     Problem: ABCDS Injury Assessment  Goal: Absence of physical injury  10/28/2023 2213 by Tj Fortune RN  Flowsheets (Taken 10/28/2023 2213)  Absence of Physical Injury: Implement safety measures based on patient assessment     Problem: Skin/Tissue Integrity  Goal: Absence of new skin breakdown  Description: 1. Monitor for areas of redness and/or skin breakdown  2. Assess vascular access sites hourly  3. Every 4-6 hours minimum:  Change oxygen saturation probe site  4. Every 4-6 hours:  If on nasal continuous positive airway pressure, respiratory therapy assess nares and determine need for appliance change or resting period.   10/28/2023 2213 by Tj Fortune RN  Outcome: Progressing     Problem: Pain  Goal: Verbalizes/displays adequate comfort level or baseline comfort level  10/28/2023 2213 by Tj Fortune RN  Outcome: Progressing  Flowsheets (Taken 10/28/2023 2213)  Verbalizes/displays adequate comfort level or baseline comfort level:   Encourage patient to monitor pain and request assistance   Assess pain using appropriate pain scale   Administer analgesics based on type and severity of pain and evaluate response     Problem: Musculoskeletal - Adult  Goal: Return mobility to safest level of function  10/28/2023 2213 by Tj Fortune RN  Outcome: Progressing  Flowsheets (Taken 10/28/2023 2213)  Return Mobility to Safest Level of Function:   Assess patient stability and activity tolerance for

## 2023-10-29 NOTE — PROGRESS NOTES
[facility] OCCUPATIONAL THERAPY  [facility] ORTHOPEDICS 7K  EVALUATION    Time:    Time In: 6034  Time Out: 2186  Timed Code Treatment Minutes: 9 Minutes  Minutes: 24          Date: 10/29/2023  Patient Name: Hilda Hatch,   Gender: female      MRN: [id number]  : 3/1/1934  (80 y.o.)  Referring Practitioner: Lamberto Shah MD  Diagnosis: intractable back pain  Additional Pertinent Hx: The patient is a 80 y.o. female who presents with complaints of lower back pain that radiates to left hip, associated with numbness of her left leg and inability to ambulate. Patient is accompanied by her daughter. She reports a long standing history of back pain but is usually able to treat this at home with Ice/heat and tylenol. Patient denies any trauma, fall or injury. She states the pain was sudden in onset and very severe that she required EMS to bring her in. She denies any loss of yovani or bladder control but admits to recent constipation and needing to use suppositories over the last few days. MRI of L spine shows acute fracture of the inferior endplate of L3. The height loss is 10%. This is most likely pathologic secondary to underlying osteoporosis.     Restrictions/Precautions:  Restrictions/Precautions: General Precautions, Up as Tolerated  Position Activity Restriction  Spinal Precautions: No Bending, No Lifting, No Twisting  Other position/activity restrictions: L3 fracture    Subjective  Chart Reviewed: Yes, Orders, Progress Notes, History and Physical  Patient assessed for rehabilitation services?: Yes  Family / Caregiver Present: No    Subjective: cooperative, up in recliner upon arrival of therapist    Pain: 0/10: no # given    Vitals: Vitals not assessed per clinical judgement, see nursing flowsheet    Social/Functional History:  Lives With: Alone  Type of Home: Apartment  Home Layout: One level  Home Access: Level entry (threshold)  Home Equipment: Walker, rolling (walking stick to get

## 2023-10-30 ENCOUNTER — TELEPHONE (OUTPATIENT)
Dept: PHYSICAL MEDICINE AND REHAB | Age: 88
End: 2023-10-30

## 2023-10-30 ENCOUNTER — APPOINTMENT (OUTPATIENT)
Dept: GENERAL RADIOLOGY | Age: 88
DRG: 479 | End: 2023-10-30
Payer: MEDICARE

## 2023-10-30 PROBLEM — M54.16 LUMBAR RADICULITIS: Status: ACTIVE | Noted: 2023-10-30

## 2023-10-30 PROBLEM — M48.062 SPINAL STENOSIS OF LUMBAR REGION WITH NEUROGENIC CLAUDICATION: Status: ACTIVE | Noted: 2023-10-30

## 2023-10-30 PROBLEM — M80.08XA VERTEBRAL FRACTURE, OSTEOPOROTIC, INITIAL ENCOUNTER (HCC): Status: ACTIVE | Noted: 2023-10-30

## 2023-10-30 PROBLEM — M54.50 ACUTE EXACERBATION OF CHRONIC LOW BACK PAIN: Status: ACTIVE | Noted: 2023-10-30

## 2023-10-30 PROBLEM — S32.030A CLOSED COMPRESSION FRACTURE OF THIRD LUMBAR VERTEBRA (HCC): Status: ACTIVE | Noted: 2023-10-30

## 2023-10-30 PROBLEM — G89.29 ACUTE EXACERBATION OF CHRONIC LOW BACK PAIN: Status: ACTIVE | Noted: 2023-10-30

## 2023-10-30 PROBLEM — M54.59 INTRACTABLE LOW BACK PAIN: Status: ACTIVE | Noted: 2023-10-30

## 2023-10-30 LAB
ANION GAP SERPL CALC-SCNC: 11 MEQ/L (ref 8–16)
BASOPHILS ABSOLUTE: 0 THOU/MM3 (ref 0–0.1)
BASOPHILS NFR BLD AUTO: 0.3 %
BUN SERPL-MCNC: 18 MG/DL (ref 7–22)
CALCIUM SERPL-MCNC: 9.1 MG/DL (ref 8.5–10.5)
CHLORIDE SERPL-SCNC: 99 MEQ/L (ref 98–111)
CO2 SERPL-SCNC: 30 MEQ/L (ref 23–33)
CREAT SERPL-MCNC: 0.6 MG/DL (ref 0.4–1.2)
DEPRECATED RDW RBC AUTO: 42.7 FL (ref 35–45)
EOSINOPHIL NFR BLD AUTO: 1 %
EOSINOPHILS ABSOLUTE: 0.1 THOU/MM3 (ref 0–0.4)
ERYTHROCYTE [DISTWIDTH] IN BLOOD BY AUTOMATED COUNT: 13 % (ref 11.5–14.5)
GFR SERPL CREATININE-BSD FRML MDRD: > 60 ML/MIN/1.73M2
GLUCOSE SERPL-MCNC: 96 MG/DL (ref 70–108)
HCT VFR BLD AUTO: 44.9 % (ref 37–47)
HGB BLD-MCNC: 14.6 GM/DL (ref 12–16)
IMM GRANULOCYTES # BLD AUTO: 0.03 THOU/MM3 (ref 0–0.07)
IMM GRANULOCYTES NFR BLD AUTO: 0.3 %
INR PPP: 1.71 (ref 0.85–1.13)
LYMPHOCYTES ABSOLUTE: 4.2 THOU/MM3 (ref 1–4.8)
LYMPHOCYTES NFR BLD AUTO: 38.6 %
MCH RBC QN AUTO: 29.3 PG (ref 26–33)
MCHC RBC AUTO-ENTMCNC: 32.5 GM/DL (ref 32.2–35.5)
MCV RBC AUTO: 90 FL (ref 81–99)
MONOCYTES ABSOLUTE: 0.7 THOU/MM3 (ref 0.4–1.3)
MONOCYTES NFR BLD AUTO: 6.3 %
NEUTROPHILS NFR BLD AUTO: 53.5 %
NRBC BLD AUTO-RTO: 0 /100 WBC
PLATELET # BLD AUTO: 241 THOU/MM3 (ref 130–400)
PMV BLD AUTO: 11.3 FL (ref 9.4–12.4)
POTASSIUM SERPL-SCNC: 3.9 MEQ/L (ref 3.5–5.2)
RBC # BLD AUTO: 4.99 MILL/MM3 (ref 4.2–5.4)
SEGMENTED NEUTROPHILS ABSOLUTE COUNT: 5.8 THOU/MM3 (ref 1.8–7.7)
SODIUM SERPL-SCNC: 140 MEQ/L (ref 135–145)
WBC # BLD AUTO: 10.9 THOU/MM3 (ref 4.8–10.8)

## 2023-10-30 PROCEDURE — 97535 SELF CARE MNGMENT TRAINING: CPT

## 2023-10-30 PROCEDURE — 2580000003 HC RX 258: Performed by: HOSPITALIST

## 2023-10-30 PROCEDURE — 36415 COLL VENOUS BLD VENIPUNCTURE: CPT

## 2023-10-30 PROCEDURE — 99232 SBSQ HOSP IP/OBS MODERATE 35: CPT | Performed by: HOSPITALIST

## 2023-10-30 PROCEDURE — 93010 ELECTROCARDIOGRAM REPORT: CPT | Performed by: NUCLEAR MEDICINE

## 2023-10-30 PROCEDURE — 6370000000 HC RX 637 (ALT 250 FOR IP): Performed by: HOSPITALIST

## 2023-10-30 PROCEDURE — 85025 COMPLETE CBC W/AUTO DIFF WBC: CPT

## 2023-10-30 PROCEDURE — 80048 BASIC METABOLIC PNL TOTAL CA: CPT

## 2023-10-30 PROCEDURE — 71045 X-RAY EXAM CHEST 1 VIEW: CPT

## 2023-10-30 PROCEDURE — 1200000000 HC SEMI PRIVATE

## 2023-10-30 PROCEDURE — 85610 PROTHROMBIN TIME: CPT

## 2023-10-30 PROCEDURE — 99223 1ST HOSP IP/OBS HIGH 75: CPT | Performed by: NURSE PRACTITIONER

## 2023-10-30 PROCEDURE — 97110 THERAPEUTIC EXERCISES: CPT

## 2023-10-30 PROCEDURE — 93005 ELECTROCARDIOGRAM TRACING: CPT | Performed by: HOSPITALIST

## 2023-10-30 RX ADMIN — LEVOTHYROXINE SODIUM 125 MCG: 0.12 TABLET ORAL at 09:18

## 2023-10-30 RX ADMIN — ATORVASTATIN CALCIUM 20 MG: 20 TABLET, FILM COATED ORAL at 18:06

## 2023-10-30 RX ADMIN — DOCUSATE SODIUM 100 MG: 100 CAPSULE, LIQUID FILLED ORAL at 20:19

## 2023-10-30 RX ADMIN — POLYETHYLENE GLYCOL 3350 17 G: 17 POWDER, FOR SOLUTION ORAL at 20:19

## 2023-10-30 RX ADMIN — DILTIAZEM HYDROCHLORIDE 360 MG: 180 CAPSULE, COATED, EXTENDED RELEASE ORAL at 09:18

## 2023-10-30 RX ADMIN — SODIUM CHLORIDE, PRESERVATIVE FREE 10 ML: 5 INJECTION INTRAVENOUS at 09:18

## 2023-10-30 RX ADMIN — ACETAMINOPHEN 650 MG: 325 TABLET ORAL at 20:19

## 2023-10-30 ASSESSMENT — PAIN DESCRIPTION - ORIENTATION: ORIENTATION: LOWER;LEFT

## 2023-10-30 ASSESSMENT — PAIN SCALES - GENERAL
PAINLEVEL_OUTOF10: 5
PAINLEVEL_OUTOF10: 6
PAINLEVEL_OUTOF10: 5

## 2023-10-30 ASSESSMENT — PAIN - FUNCTIONAL ASSESSMENT: PAIN_FUNCTIONAL_ASSESSMENT: PREVENTS OR INTERFERES SOME ACTIVE ACTIVITIES AND ADLS

## 2023-10-30 ASSESSMENT — PAIN DESCRIPTION - ONSET: ONSET: ON-GOING

## 2023-10-30 ASSESSMENT — PAIN DESCRIPTION - DESCRIPTORS: DESCRIPTORS: ACHING

## 2023-10-30 ASSESSMENT — PAIN DESCRIPTION - FREQUENCY: FREQUENCY: CONTINUOUS

## 2023-10-30 ASSESSMENT — PAIN DESCRIPTION - LOCATION: LOCATION: BACK

## 2023-10-30 ASSESSMENT — PAIN DESCRIPTION - PAIN TYPE: TYPE: ACUTE PAIN

## 2023-10-30 NOTE — PROGRESS NOTES
[facility]  INPATIENT PHYSICAL THERAPY  [facility] ORTHOPEDICS 7K - 7K-04/004-A  Daily Note    Time In: 2937  Time Out: 2437  Timed Code Treatment Minutes: 28 Minutes  Minutes: 28          Date: 10/30/2023  Patient Name: Karina Tavarez,  Gender:  female        MRN: [id number]  : 3/1/1934  (80 y.o.)     Referring Practitioner: Supa aBdillo MD  Diagnosis: Intractable back pain  Additional Pertinent Hx: Per EMR:The patient is a 80 y.o. female who presents with complaints of lower back pain that radiates to left hip, associated with numbness of her left leg and inability to ambulate. Patient is accompanied by her daughter. She reports a long standing history of back pain but is usually able to treat this at home with Ice/heat and tylenol. Patient denies any trauma, fall or injury. She states the pain was sudden in onset and very severe that she required EMS to bring her in. She denies any loss of yovani or bladder control but admits to recent constipation and needing to use suppositories over the last few days. MRI of L spine shows acute fracture of the inferior endplate of L3. The height loss is 10%. This is most likely pathologic secondary to underlying osteoporosis. Prior Level of Function:  Lives With: Alone  Type of Home: Apartment  Home Layout: One level  Home Access: Level entry (threshold)  Home Equipment: Walker, rolling (walking stick to get mail)   Bathroom Shower/Tub: Tub/Shower unit (sponge bathe x 1 week)  Bathroom Toilet: Handicap height  Bathroom Equipment: Commode    Receives Help From: Family  ADL Assistance: Independent  Homemaking Assistance: Independent  Ambulation Assistance: Independent  Transfer Assistance: Independent  Active : Yes  Additional Comments: Patient reports she was active and independent PTA without use of an AD. Usually patient is able to complete all ADLs but recently has had help from dtr and son for bathing due to back pain.  Patient reports she has

## 2023-10-30 NOTE — PROGRESS NOTES
[facility]  Occupational Therapy  Daily Note  Time:   Time In:   Time Out: 7830  Timed Code Treatment Minutes: 26 Minutes  Minutes: 26          Date: 10/30/2023  Patient Name: Deneen Michele,   Gender: female      Room: [facility]004-A  MRN: [id number]  : 3/1/1934  (80 y.o.)  Referring Practitioner: Maia Cid MD  Diagnosis: intractable back pain  Additional Pertinent Hx: The patient is a 80 y.o. female who presents with complaints of lower back pain that radiates to left hip, associated with numbness of her left leg and inability to ambulate. Patient is accompanied by her daughter. She reports a long standing history of back pain but is usually able to treat this at home with Ice/heat and tylenol. Patient denies any trauma, fall or injury. She states the pain was sudden in onset and very severe that she required EMS to bring her in. She denies any loss of yovani or bladder control but admits to recent constipation and needing to use suppositories over the last few days. MRI of L spine shows acute fracture of the inferior endplate of L3. The height loss is 10%. This is most likely pathologic secondary to underlying osteoporosis. Restrictions/Precautions:  Restrictions/Precautions: General Precautions, Up as Tolerated  Position Activity Restriction  Spinal Precautions: No Bending, No Lifting, No Twisting  Other position/activity restrictions: L3 fracture     SUBJECTIVE: Rn okayed OT session. Pt. In room and agreeable to participate. PAIN: 5/10: LLE    Vitals: Nurse checked vitals prior to session    COGNITION: WFL    ADL:   Grooming: Contact Guard Assistance. To stand at sink and complete  Bathing: with set-up. To complete sponge bath with Stand By Assistance and [facility] with standing portions. Upper Extremity Dressing: Minimal Assistance. To don gown  Toileting: Contact Guard Assistance. Toilet Transfer: Contact Guard Assistance. Binh Running BALANCE:  Standing Balance: Stand By Assistance, [employer]. BED MOBILITY:  Not Tested    TRANSFERS:  Sit to Stand:  Contact Guard Assistance. Stand to Sit: Contact Guard Assistance. FUNCTIONAL MOBILITY:  Assistive Device: Rolling Walker  Assist Level:  Contact Guard Assistance. Distance: To and from bathroom  No LOB      AM-[facility] Inpatient Daily Activity Raw Score: 20  AM-PAC Inpatient ADL T-Scale Score : 42.03  ADL Inpatient CMS 0-100% Score: 38.32    ASSESSMENT:     Activity Tolerance:  Patient tolerance of  treatment: Good treatment tolerance      Discharge Recommendations: Continue to assess pending progress, Home with assist as needed, and Home with Home Health OT  Equipment Recommendations: Other: monitor pending progress  Plan: Times Per Week: 6x  Current Treatment Recommendations: Functional mobility training, Balance training, Self-Care / ADL, Safety education & training, Endurance training    Patient Education  Patient Education: ADL's, Importance of Increasing Activity, and Assistive Device Safety    Goals  Short Term Goals  Time Frame for Short Term Goals: until discharge  Short Term Goal 1: Pt will complete various t/fs including toilet with mod I & 0 vcs for safety  Short Term Goal 2: Pt will complete BADL routine with S & 0-2 vcs for safety & adaptive strategies prn  Short Term Goal 3: Pt will demo good awareness of back precautions/protection strategies for returning to IADLs at home as evidenced by id 2 with min vcs  Long Term Goals  Time Frame for Long Term Goals : No LTG set d/t short ELOS    Following session, patient left in safe position with all fall risk precautions in place.

## 2023-10-30 NOTE — CONSULTS
Pain Consult Note      Admitting Physician: Mary Anne Rebolledo MD    Primary Care Provider: LISA Jasso - CNP     Reason for Consult:  Intractable low back pain related to compression fracture, right leg pain     History of Present Illness:  Kuldeep Sharif is a 80 y.o. female admitted to [address] [place] [address] on 10/26/2023. 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 Patient reports that she has a longstanding history of pain in her low back but this is usually treatable with ice, heat, and Tylenol but she reports that over the past week her pain in her low back is increased to the point where she could not tolerate any walking, standing and has been very limiting with mobility. Patient reports pain will radiate into her left buttock and down her left leg with numbness and burning pain again she states this has been present for a while but has increased over the past week. Patient denies any trauma, does have a history of unsteadiness but denies any recent falls, or any significant event to cause the pain. Work-up was completed and patient had a lumbar MRI which showed an acute fracture of the inferior endplate of L3 with 57% loss of height most likely pathological secondary to underlying osteoporosis and also show degenerative changes throughout her lumbar spine which is most pronounced at L3-4 and L4-5 with moderate severe spinal stenosis and severe bilateral foraminal stenosis. She was seen by the Ortho spine team who consulted pain management to evaluate for kyphoplasty of L3 and left lumbar epidural steroid injection. Patient has been on Coumadin prior to admission for history of DVTs and this was last taken and 10/27/2023.      The patient

## 2023-10-30 NOTE — PLAN OF CARE
Problem: Discharge Planning  Goal: Discharge to home or other facility with appropriate resources  Outcome: Progressing  Flowsheets (Taken 10/30/2023 1604)  Discharge to home or other facility with appropriate resources: Identify barriers to discharge with patient and caregiver     Problem: Safety - Adult  Goal: Free from fall injury  Outcome: Progressing  Flowsheets (Taken 10/30/2023 1604)  Free From Fall Injury: Instruct family/caregiver on patient safety     Problem: ABCDS Injury Assessment  Goal: Absence of physical injury  Outcome: Progressing  Flowsheets (Taken 10/30/2023 1604)  Absence of Physical Injury: Implement safety measures based on patient assessment     Problem: Skin/Tissue Integrity  Goal: Absence of new skin breakdown  Description: 1. Monitor for areas of redness and/or skin breakdown  2. Assess vascular access sites hourly  3. Every 4-6 hours minimum:  Change oxygen saturation probe site  4. Every 4-6 hours:  If on nasal continuous positive airway pressure, respiratory therapy assess nares and determine need for appliance change or resting period. Outcome: Progressing     Problem: Pain  Goal: Verbalizes/displays adequate comfort level or baseline comfort level  Outcome: Progressing  Flowsheets (Taken 10/30/2023 1604)  Verbalizes/displays adequate comfort level or baseline comfort level: Assess pain using appropriate pain scale     Problem: Musculoskeletal - Adult  Goal: Return mobility to safest level of function  Outcome: Progressing  Flowsheets (Taken 10/30/2023 1604)  Return Mobility to Safest Level of Function: Assist with transfers and ambulation using safe patient handling equipment as needed     Problem: Gastrointestinal - Adult  Goal: Maintains or returns to baseline bowel function  Outcome: Progressing  Flowsheets (Taken 10/30/2023 1604)  Maintains or returns to baseline bowel function: Administer IV fluids as ordered to ensure adequate hydration   Care plan reviewed with patient. Patient verbalize understanding of the plan of care and contribute to goal setting.

## 2023-10-30 NOTE — TELEPHONE ENCOUNTER
SURGERY [address] [facility] [place], [address] [facility],[address]      Phone *[phone]   Surgical Scheduling Direct Line Phone *[phone] Fax *[phone]      [facility]   3/1/1934   female    [address] [facility] [address] [place] [address]              Home Phone: [phone]    Cell Phone:    Telephone Information:   Mobile [phone]          Surgeon:  January Aiken MD             Surgery Date: 11/1/2023  Time: 6897 / to follow    Procedure: Lumbar 3 Kyphoplasty and Left Lumbar 4 Epidural Steroid Injection      Diagnosis: compression fracture      Important Medical History:       Special Inst/Equip:     Anesthesia:  Gen            Admission Type:    Inpatient     Case Location:      Main OR         Need Preop Cardiac Clearance:       Does Patient have Cardiologist/physician?          Surgery Confirmation #: __________________________________________________    [facility]   Date: __________10/30/2023________________     [employer] Name: [employer]

## 2023-10-30 NOTE — TELEPHONE ENCOUNTER
SURGERY [address] [facility], [address] [facility],[address]      Phone *[phone]   Surgical Scheduling Direct Line Phone *[phone] Fax *[phone]      Rosa Betancur   3/1/1934   female    [address] [facility] [address]              Home Phone: [phone]    Cell Phone:    Telephone Information:   Mobile [phone]          Surgeon: Mahin Caldwell MD   Surgery Date: 10/31/2023  Time: 0735 - to follow    Procedure: lumbar 3 kypho and left lumbar 3 epidural steroid injection    Diagnosis: compression fracture     Important Medical History:       Special Inst/Equip:     Anesthesia:  Gen            Admission Type:     Inpatient     Case Location:      Main OR         Need Preop Cardiac Clearance:   no    Does Patient have Cardiologist/physician?          Surgery Confirmation #: __________________________________________________    : ___Mariella_____________________   Date: __10/30/2023________________________     [employer] Name: [facility]

## 2023-10-31 LAB
ANION GAP SERPL CALC-SCNC: 13 MEQ/L (ref 8–16)
APTT PPP: 29.1 SECONDS (ref 22–38)
BASOPHILS ABSOLUTE: 0 THOU/MM3 (ref 0–0.1)
BASOPHILS NFR BLD AUTO: 0.2 %
BUN SERPL-MCNC: 19 MG/DL (ref 7–22)
CALCIUM SERPL-MCNC: 8.9 MG/DL (ref 8.5–10.5)
CHLORIDE SERPL-SCNC: 101 MEQ/L (ref 98–111)
CO2 SERPL-SCNC: 27 MEQ/L (ref 23–33)
CREAT SERPL-MCNC: 0.7 MG/DL (ref 0.4–1.2)
DEPRECATED RDW RBC AUTO: 42.5 FL (ref 35–45)
EOSINOPHIL NFR BLD AUTO: 1.1 %
EOSINOPHILS ABSOLUTE: 0.1 THOU/MM3 (ref 0–0.4)
ERYTHROCYTE [DISTWIDTH] IN BLOOD BY AUTOMATED COUNT: 13 % (ref 11.5–14.5)
GFR SERPL CREATININE-BSD FRML MDRD: > 60 ML/MIN/1.73M2
GLUCOSE SERPL-MCNC: 116 MG/DL (ref 70–108)
HCT VFR BLD AUTO: 39.9 % (ref 37–47)
HEPARIN UNFRACTIONATED: 0.72 U/ML (ref 0.3–0.7)
HEPARIN UNFRACTIONATED: < 0.04 U/ML (ref 0.3–0.7)
HGB BLD-MCNC: 13 GM/DL (ref 12–16)
IMM GRANULOCYTES # BLD AUTO: 0.03 THOU/MM3 (ref 0–0.07)
IMM GRANULOCYTES NFR BLD AUTO: 0.2 %
INR PPP: 1.3 (ref 0.85–1.13)
INR PPP: 1.43 (ref 0.85–1.13)
LYMPHOCYTES ABSOLUTE: 4.2 THOU/MM3 (ref 1–4.8)
LYMPHOCYTES NFR BLD AUTO: 34.9 %
MCH RBC QN AUTO: 29.5 PG (ref 26–33)
MCHC RBC AUTO-ENTMCNC: 32.6 GM/DL (ref 32.2–35.5)
MCV RBC AUTO: 90.5 FL (ref 81–99)
MONOCYTES ABSOLUTE: 0.9 THOU/MM3 (ref 0.4–1.3)
MONOCYTES NFR BLD AUTO: 7.6 %
NEUTROPHILS NFR BLD AUTO: 56 %
NRBC BLD AUTO-RTO: 0 /100 WBC
PLATELET # BLD AUTO: 213 THOU/MM3 (ref 130–400)
PMV BLD AUTO: 11.2 FL (ref 9.4–12.4)
POTASSIUM SERPL-SCNC: 4.1 MEQ/L (ref 3.5–5.2)
RBC # BLD AUTO: 4.41 MILL/MM3 (ref 4.2–5.4)
REASON FOR REJECTION: NORMAL
REJECTED TEST: NORMAL
SEGMENTED NEUTROPHILS ABSOLUTE COUNT: 6.8 THOU/MM3 (ref 1.8–7.7)
SODIUM SERPL-SCNC: 141 MEQ/L (ref 135–145)
WBC # BLD AUTO: 12.1 THOU/MM3 (ref 4.8–10.8)

## 2023-10-31 PROCEDURE — 99232 SBSQ HOSP IP/OBS MODERATE 35: CPT | Performed by: NURSE PRACTITIONER

## 2023-10-31 PROCEDURE — 96375 TX/PRO/DX INJ NEW DRUG ADDON: CPT

## 2023-10-31 PROCEDURE — 6360000002 HC RX W HCPCS: Performed by: NURSE PRACTITIONER

## 2023-10-31 PROCEDURE — 6370000000 HC RX 637 (ALT 250 FOR IP): Performed by: PHYSICIAN ASSISTANT

## 2023-10-31 PROCEDURE — 85610 PROTHROMBIN TIME: CPT

## 2023-10-31 PROCEDURE — 1200000000 HC SEMI PRIVATE

## 2023-10-31 PROCEDURE — 80048 BASIC METABOLIC PNL TOTAL CA: CPT

## 2023-10-31 PROCEDURE — 96365 THER/PROPH/DIAG IV INF INIT: CPT

## 2023-10-31 PROCEDURE — 6370000000 HC RX 637 (ALT 250 FOR IP): Performed by: HOSPITALIST

## 2023-10-31 PROCEDURE — 85520 HEPARIN ASSAY: CPT

## 2023-10-31 PROCEDURE — 85025 COMPLETE CBC W/AUTO DIFF WBC: CPT

## 2023-10-31 PROCEDURE — 2580000003 HC RX 258: Performed by: HOSPITALIST

## 2023-10-31 PROCEDURE — 99233 SBSQ HOSP IP/OBS HIGH 50: CPT | Performed by: NURSE PRACTITIONER

## 2023-10-31 PROCEDURE — 36415 COLL VENOUS BLD VENIPUNCTURE: CPT

## 2023-10-31 PROCEDURE — 97110 THERAPEUTIC EXERCISES: CPT

## 2023-10-31 PROCEDURE — 6370000000 HC RX 637 (ALT 250 FOR IP): Performed by: NURSE PRACTITIONER

## 2023-10-31 PROCEDURE — 96366 THER/PROPH/DIAG IV INF ADDON: CPT

## 2023-10-31 PROCEDURE — 85730 THROMBOPLASTIN TIME PARTIAL: CPT

## 2023-10-31 RX ORDER — HEPARIN SODIUM 10000 [USP'U]/100ML
5-30 INJECTION, SOLUTION INTRAVENOUS CONTINUOUS
Status: DISCONTINUED | OUTPATIENT
Start: 2023-10-31 | End: 2023-11-03

## 2023-10-31 RX ORDER — POLYETHYLENE GLYCOL 3350 17 G/17G
17 POWDER, FOR SOLUTION ORAL DAILY
Status: DISCONTINUED | OUTPATIENT
Start: 2023-10-31 | End: 2023-11-03 | Stop reason: HOSPADM

## 2023-10-31 RX ORDER — HEPARIN SODIUM 1000 [USP'U]/ML
80 INJECTION, SOLUTION INTRAVENOUS; SUBCUTANEOUS PRN
Status: DISCONTINUED | OUTPATIENT
Start: 2023-10-31 | End: 2023-11-03 | Stop reason: HOSPADM

## 2023-10-31 RX ORDER — HEPARIN SODIUM 1000 [USP'U]/ML
40 INJECTION, SOLUTION INTRAVENOUS; SUBCUTANEOUS ONCE
Status: COMPLETED | OUTPATIENT
Start: 2023-10-31 | End: 2023-10-31

## 2023-10-31 RX ORDER — SENNOSIDES A AND B 8.6 MG/1
1 TABLET, FILM COATED ORAL NIGHTLY
Status: DISCONTINUED | OUTPATIENT
Start: 2023-10-31 | End: 2023-11-03 | Stop reason: HOSPADM

## 2023-10-31 RX ORDER — HEPARIN SODIUM 1000 [USP'U]/ML
40 INJECTION, SOLUTION INTRAVENOUS; SUBCUTANEOUS PRN
Status: DISCONTINUED | OUTPATIENT
Start: 2023-10-31 | End: 2023-11-03 | Stop reason: HOSPADM

## 2023-10-31 RX ADMIN — HEPARIN SODIUM 2900 UNITS: 1000 INJECTION INTRAVENOUS; SUBCUTANEOUS at 15:07

## 2023-10-31 RX ADMIN — DILTIAZEM HYDROCHLORIDE 360 MG: 180 CAPSULE, COATED, EXTENDED RELEASE ORAL at 09:05

## 2023-10-31 RX ADMIN — SENNOSIDES 8.6 MG: 8.6 TABLET, FILM COATED ORAL at 20:08

## 2023-10-31 RX ADMIN — DOCUSATE SODIUM 100 MG: 100 CAPSULE, LIQUID FILLED ORAL at 20:08

## 2023-10-31 RX ADMIN — HEPARIN SODIUM 18 UNITS/KG/HR: 10000 INJECTION, SOLUTION INTRAVENOUS at 15:07

## 2023-10-31 RX ADMIN — DOCUSATE SODIUM 100 MG: 100 CAPSULE, LIQUID FILLED ORAL at 09:05

## 2023-10-31 RX ADMIN — ATORVASTATIN CALCIUM 20 MG: 20 TABLET, FILM COATED ORAL at 17:53

## 2023-10-31 RX ADMIN — LEVOTHYROXINE SODIUM 125 MCG: 0.12 TABLET ORAL at 09:07

## 2023-10-31 RX ADMIN — SODIUM CHLORIDE, PRESERVATIVE FREE 10 ML: 5 INJECTION INTRAVENOUS at 09:08

## 2023-10-31 RX ADMIN — POLYETHYLENE GLYCOL 3350 17 G: 17 POWDER, FOR SOLUTION ORAL at 13:18

## 2023-10-31 ASSESSMENT — PAIN SCALES - GENERAL
PAINLEVEL_OUTOF10: 5
PAINLEVEL_OUTOF10: 4
PAINLEVEL_OUTOF10: 4
PAINLEVEL_OUTOF10: 0

## 2023-10-31 ASSESSMENT — PAIN DESCRIPTION - LOCATION
LOCATION: BACK
LOCATION: BACK

## 2023-10-31 ASSESSMENT — PAIN DESCRIPTION - DESCRIPTORS: DESCRIPTORS: THROBBING

## 2023-10-31 NOTE — PROGRESS NOTES
[address]   INPATIENT PHYSICAL THERAPY  [facility] ORTHOPEDICS 7K - 7K-04/004-A  Daily Note    Time In: 0818  Time Out: 0845  Timed Code Treatment Minutes: 32 Minutes  Minutes: 27          Date: 10/31/2023  Patient Name: Yasmine Trejo,  Gender:  female        MRN: [id number]  : 3/1/1934  (80 y.o.)     Referring Practitioner: Anu Gorman MD  Diagnosis: Intractable back pain  Additional Pertinent Hx: Per EMR:The patient is a 80 y.o. female who presents with complaints of lower back pain that radiates to left hip, associated with numbness of her left leg and inability to ambulate. Patient is accompanied by her daughter. She reports a long standing history of back pain but is usually able to treat this at home with Ice/heat and tylenol. Patient denies any trauma, fall or injury. She states the pain was sudden in onset and very severe that she required EMS to bring her in. She denies any loss of yovani or bladder control but admits to recent constipation and needing to use suppositories over the last few days. MRI of L spine shows acute fracture of the inferior endplate of L3. The height loss is 10%. This is most likely pathologic secondary to underlying osteoporosis. Prior Level of Function:  Lives With: Alone  Type of Home: Apartment  Home Layout: One level  Home Access: Level entry (threshold)  Home Equipment: Walker, rolling (walking stick to get mail)   Bathroom Shower/Tub: Tub/Shower unit (sponge bathe x 1 week)  Bathroom Toilet: Handicap height  Bathroom Equipment: Commode    Receives Help From: Family  ADL Assistance: Independent  Homemaking Assistance: Independent  Ambulation Assistance: Independent  Transfer Assistance: Independent  Active : Yes  Additional Comments: Patient reports she was active and independent PTA without use of an AD. Usually patient is able to complete all ADLs but recently has had help from dtr and son for bathing due to back pain.  Patient reports she has Dynamic Sitting Balance: Supervision when unsupported  Static Standing Balance: Stand By Assistance  Dynamic Standing Balance: Contact Guard AssistanceTrailed marches in standing for increased balance in SLS however patient unable to tolerate d/t increase pain in lower back. Neuromuscular Re-education  None    Exercise:  Patient was guided in 1 set(s) 15 reps of exercises: Ankle pumps, Glut sets, Quad sets, Heelslides, Short arc quads, Hip abduction/adduction . Exercises were completed for increased independence with functional mobility. *pt voiced increased pain with exercises at times. Educated patient on pain management and tolerance with exercises pt continued to complete 15 reps of exercise with rest breaks encouraged from therapist however patient denied. Functional Outcome Measures:   [facility] (6 CLICK) BASIC MOBILITY  AM-PAC Inpatient Mobility Raw Score : 18  AM-PAC Inpatient T-Scale Score : 43.63  Mobility Inpatient CMS 0-100% Score: 46.58  Mobility Inpatient CMS G-Code Modifier : CK       ASSESSMENT:  Assessment: Patient progressing toward established goals. Activity Tolerance:  Patient tolerance of  treatment: good.      Equipment Recommendations:Equipment Needed: No (has RW)  Discharge Recommendations:  Continue to assess pending progress, Home with Assist as Needed,pending clinical course, and Patient would benefit from continued PT at discharge    PLAN: Current Treatment Recommendations: Strengthening, Balance training, Functional mobility training, Transfer training, Gait training, Stair training, Home exercise program, Equipment evaluation, education, & procurement, Patient/Caregiver education & training, Safety education & training, Therapeutic activities  General Plan:  (5-6xO)    Patient Education  Patient Education: Plan of Care, Functional Mobility, Reviewed Prior Education, Health Promotion and Wellness Education, Safety, Verbal Exercise Instruction, Bed Mobility, Transfers,

## 2023-10-31 NOTE — PLAN OF CARE
Problem: Discharge Planning  Goal: Discharge to home or other facility with appropriate resources  Outcome: Progressing  Flowsheets (Taken 10/31/2023 1845)  Discharge to home or other facility with appropriate resources: Identify barriers to discharge with patient and caregiver     Problem: Safety - Adult  Goal: Free from fall injury  Outcome: Progressing  Flowsheets (Taken 10/30/2023 1604)  Free From Fall Injury: Instruct family/caregiver on patient safety     Problem: ABCDS Injury Assessment  Goal: Absence of physical injury  Outcome: Progressing  Flowsheets (Taken 10/31/2023 1845)  Absence of Physical Injury: Implement safety measures based on patient assessment     Problem: Skin/Tissue Integrity  Goal: Absence of new skin breakdown  Description: 1. Monitor for areas of redness and/or skin breakdown  2. Assess vascular access sites hourly  3. Every 4-6 hours minimum:  Change oxygen saturation probe site  4. Every 4-6 hours:  If on nasal continuous positive airway pressure, respiratory therapy assess nares and determine need for appliance change or resting period. Outcome: Progressing     Problem: Pain  Goal: Verbalizes/displays adequate comfort level or baseline comfort level  Outcome: Progressing  Flowsheets (Taken 10/30/2023 1604)  Verbalizes/displays adequate comfort level or baseline comfort level: Assess pain using appropriate pain scale     Problem: Musculoskeletal - Adult  Goal: Return mobility to safest level of function  Outcome: Progressing  Flowsheets (Taken 10/31/2023 1845)  Return Mobility to Safest Level of Function: Assess patient stability and activity tolerance for standing, transferring and ambulating with or without assistive devices     Problem: Gastrointestinal - Adult  Goal: Maintains or returns to baseline bowel function  Outcome: Progressing  Flowsheets (Taken 10/31/2023 1845)  Maintains or returns to baseline bowel function: Assess bowel function   Care plan reviewed with patient.

## 2023-10-31 NOTE — PLAN OF CARE
Message sent to pain management regarding heparin drip and okay with that in place to nursing communication order to stop the heparin drip at 2:00 in the morning on November 1, 2023 in preparation for kyphoplasty

## 2023-10-31 NOTE — PROGRESS NOTES
[facility]  OCCUPATIONAL THERAPY MISSED TREATMENT NOTE  [facility] ORTHOPEDICS 7K  7K-04/004-A      Date: 10/31/2023  Patient Name: Jeramy Wang        CSN: [id number]   : 3/1/1934  (80 y.o.)  Gender: female   Referring Practitioner: Shannan Hernandez MD  Diagnosis: intractable back pain         REASON FOR MISSED TREATMENT: OT treatment attempted X 2. First attempt Pt. Eating lunch and second attempt in bed fatigued and declining to get OOB.

## 2023-10-31 NOTE — CARE COORDINATION
10/31/23, 12:14 PM EDT    DISCHARGE ON GOING EVALUATION    [facility] day: 1  Location: 7K-04/004-A Reason for admit: Intractable back pain [M54.9]  Intractable low back pain [M54.59]  Anticoagulated on Coumadin [Z79.01]  Acute exacerbation of chronic low back pain [M54.50, G89.29]  Vertebral fracture, osteoporotic, initial encounter ([address]) [M80.08XA]   Procedure:   10/26 CT Abd/Plv W: 1. A few nodular opacities are seen in the lingula measuring between 2 and 6 mm. Pelvic floor descent is seen that can suggest pelvic floor relaxation dysfunction. Marked osteopenia. Acute L3 compression fracture  Lumbar spinal stenosis with radiculopathy  Planning kyphoplasty 11/1/23  Barriers to Discharge: INR 1.43, holding [address] IP status; continue therapy. SCDs. Lidocaine patch. Last BM 10/27. PCP: LISA Lucas CNP  Readmission Risk Score: 8.2%  Patient Goals/Plan/Treatment Preferences: Flosie is from home alone; has RW and following for DME needs post procedure.

## 2023-10-31 NOTE — PROGRESS NOTES
Hospitalist Progress Note    Patient:  Emma Mesa      Unit/Bed:7K-04/004-A    YOB: 1934    MRN: [id number]       Acct: [de-identified]     PCP: LISA Pichardo CNP    Date of Admission: 10/26/2023    Assessment/Plan:    Acute nontraumatic L3 compression fracture, possibly pathologic, possibly secondary to osteoporosis; appreciate pain management input~plan to schedule patient for Kyphoplasty @ L3 and Lesadele LESELISEO at L4 currently tentatively scheduled to follow in the main OR on Wednesday  Primary hypertension, uncontrolled--on Cardizem; monitor  History of PE in 2013--Coumadin on hold; INR down to 1.43 so would like to initiate heparin drip and await pain management input regarding time of kyphoplasty~message sent  Hypothyroidism--treated  CODE STATUS--limited x4      Expected discharge date: Pending clinical course    Disposition:    [] Home       [] TCU       [] Rehab       [] Psych       [] SNF       [] [address]       [] Other-    Chief Complaint: Back pain    Hospital Course: Per H&P done 10/26/2023: \"The patient is a 80 y.o. female who presents with complaints of lower back pain that radiates to left hip, associated with numbness of her left leg and inability to ambulate. Patient is accompanied by her daughter. She reports a long standing history of back pain but is usually able to treat this at home with Ice/heat and tylenol. Patient denies any trauma, fall or injury. She states the pain was sudden in onset and very severe that she required EMS to bring her in. She denies any loss of yovani or bladder control but admits to recent constipation and needing to use suppositories over the last few days.   No saddle anesthesia\"    10/31--> hemodynamically stable, plan is possible kyphoplasty tomorrow; patient takes Coumadin at home secondary to history of PE, INR today at 1.43; message sent to pain management to see if we can initiate a heparin drip     Subjective

## 2023-10-31 NOTE — PROGRESS NOTES
Pain Management   Progress Note      10/31/2023 2:46 PM     Chief Complaint: Low back pain, left leg pain     SUBJECTIVE:    Emma was seen in her room resting comfortably in her bed. Continues to have pain in her low back radiating down her left leg. States pain is tolerable at rest and increased with moving her low back or left leg. Discussed plan for Kyphoplasty and Left LESI scheduled tomorrow. Only concern she had was sickness she gets with anesthesia after words. She has used on dose of tylenol in the past 24 hours, no Norco and feels the Lidoderm patches stevan really helping. Denies any other needs at this time.    Medications effective:  yes  Pain rating is 5-6/10       Current medications:    polyethylene glycol  17 g Oral Daily    senna  1 tablet Oral Nightly    heparin (porcine)  40 Units/kg IntraVENous Once    lidocaine  2 patch TransDERmal Daily    atorvastatin  20 mg Oral QPM    dilTIAZem  360 mg Oral Daily    sodium chloride flush  5-40 mL IntraVENous 2 times per day    docusate sodium  100 mg Oral BID    levothyroxine  125 mcg Oral Once per day on Mon Tue Wed Thu Fri                                        heparin (porcine), heparin (porcine), bisacodyl, sodium chloride flush, sodium chloride, ondansetron **OR** ondansetron, polyethylene glycol, acetaminophen **OR** acetaminophen, cyclobenzaprine, HYDROcodone 5 mg - acetaminophen                                     @MEDSINFUSIONS          Review of Systems:  CONSTITUTIONAL:  negative  EYES:  negative  HEENT:  negative  RESPIRATORY:  negative  CARDIOVASCULAR:  negative  GASTROINTESTINAL:  Last bowel movement was 10/28/2023  GENITOURINARY:  negative  SKIN:  negative  HEMATOLOGIC/LYMPHATIC:  negative  MUSCULOSKELETAL:  positive for  myalgias, arthralgias, pain, muscle weakness, and bone pain, low back pain   NEUROLOGICAL:  positive for coordination problems, gait problems, weakness, numbness, and pain  BEHAVIOR/PSYCH:  negative  System review

## 2023-11-01 LAB
ANION GAP SERPL CALC-SCNC: 8 MEQ/L (ref 8–16)
BASOPHILS ABSOLUTE: 0 THOU/MM3 (ref 0–0.1)
BASOPHILS NFR BLD AUTO: 0.4 %
BUN SERPL-MCNC: 13 MG/DL (ref 7–22)
CALCIUM SERPL-MCNC: 8.8 MG/DL (ref 8.5–10.5)
CHLORIDE SERPL-SCNC: 100 MEQ/L (ref 98–111)
CO2 SERPL-SCNC: 29 MEQ/L (ref 23–33)
CREAT SERPL-MCNC: 0.5 MG/DL (ref 0.4–1.2)
DEPRECATED RDW RBC AUTO: 43.7 FL (ref 35–45)
EKG Q-T INTERVAL: 406 MS
EKG QRS DURATION: 78 MS
EKG QTC CALCULATION (BAZETT): 422 MS
EKG R AXIS: -9 DEGREES
EKG T AXIS: 61 DEGREES
EKG VENTRICULAR RATE: 65 BPM
EOSINOPHIL NFR BLD AUTO: 1.5 %
EOSINOPHILS ABSOLUTE: 0.2 THOU/MM3 (ref 0–0.4)
ERYTHROCYTE [DISTWIDTH] IN BLOOD BY AUTOMATED COUNT: 13 % (ref 11.5–14.5)
GFR SERPL CREATININE-BSD FRML MDRD: > 60 ML/MIN/1.73M2
GLUCOSE SERPL-MCNC: 108 MG/DL (ref 70–108)
HCT VFR BLD AUTO: 42.6 % (ref 37–47)
HEPARIN UNFRACTIONATED: 0.17 U/ML (ref 0.3–0.7)
HEPARIN UNFRACTIONATED: 0.47 U/ML (ref 0.3–0.7)
HEPARIN UNFRACTIONATED: < 0.04 U/ML (ref 0.3–0.7)
HGB BLD-MCNC: 13.6 GM/DL (ref 12–16)
IMM GRANULOCYTES # BLD AUTO: 0.03 THOU/MM3 (ref 0–0.07)
IMM GRANULOCYTES NFR BLD AUTO: 0.3 %
INR PPP: 0.76 (ref 0.85–1.13)
INR PPP: 1.36 (ref 0.85–1.13)
LYMPHOCYTES ABSOLUTE: 4.4 THOU/MM3 (ref 1–4.8)
LYMPHOCYTES NFR BLD AUTO: 41.1 %
MCH RBC QN AUTO: 29.5 PG (ref 26–33)
MCHC RBC AUTO-ENTMCNC: 31.9 GM/DL (ref 32.2–35.5)
MCV RBC AUTO: 92.4 FL (ref 81–99)
MONOCYTES ABSOLUTE: 0.9 THOU/MM3 (ref 0.4–1.3)
MONOCYTES NFR BLD AUTO: 8.2 %
NEUTROPHILS NFR BLD AUTO: 48.5 %
NRBC BLD AUTO-RTO: 0 /100 WBC
PLATELET # BLD AUTO: 221 THOU/MM3 (ref 130–400)
PMV BLD AUTO: 11.6 FL (ref 9.4–12.4)
POTASSIUM SERPL-SCNC: 3.9 MEQ/L (ref 3.5–5.2)
RBC # BLD AUTO: 4.61 MILL/MM3 (ref 4.2–5.4)
SEGMENTED NEUTROPHILS ABSOLUTE COUNT: 5.1 THOU/MM3 (ref 1.8–7.7)
SODIUM SERPL-SCNC: 137 MEQ/L (ref 135–145)
WBC # BLD AUTO: 10.6 THOU/MM3 (ref 4.8–10.8)

## 2023-11-01 PROCEDURE — 6360000002 HC RX W HCPCS: Performed by: NURSE PRACTITIONER

## 2023-11-01 PROCEDURE — 96366 THER/PROPH/DIAG IV INF ADDON: CPT

## 2023-11-01 PROCEDURE — 99232 SBSQ HOSP IP/OBS MODERATE 35: CPT | Performed by: NURSE PRACTITIONER

## 2023-11-01 PROCEDURE — 6370000000 HC RX 637 (ALT 250 FOR IP): Performed by: HOSPITALIST

## 2023-11-01 PROCEDURE — 97530 THERAPEUTIC ACTIVITIES: CPT

## 2023-11-01 PROCEDURE — 85520 HEPARIN ASSAY: CPT

## 2023-11-01 PROCEDURE — 2580000003 HC RX 258: Performed by: HOSPITALIST

## 2023-11-01 PROCEDURE — 36415 COLL VENOUS BLD VENIPUNCTURE: CPT

## 2023-11-01 PROCEDURE — 6370000000 HC RX 637 (ALT 250 FOR IP): Performed by: PHYSICIAN ASSISTANT

## 2023-11-01 PROCEDURE — 97116 GAIT TRAINING THERAPY: CPT

## 2023-11-01 PROCEDURE — 85610 PROTHROMBIN TIME: CPT

## 2023-11-01 PROCEDURE — 1200000000 HC SEMI PRIVATE

## 2023-11-01 PROCEDURE — 80048 BASIC METABOLIC PNL TOTAL CA: CPT

## 2023-11-01 PROCEDURE — 96376 TX/PRO/DX INJ SAME DRUG ADON: CPT

## 2023-11-01 PROCEDURE — 97110 THERAPEUTIC EXERCISES: CPT

## 2023-11-01 PROCEDURE — 85025 COMPLETE CBC W/AUTO DIFF WBC: CPT

## 2023-11-01 RX ADMIN — DILTIAZEM HYDROCHLORIDE 360 MG: 180 CAPSULE, COATED, EXTENDED RELEASE ORAL at 09:22

## 2023-11-01 RX ADMIN — HEPARIN SODIUM 17 UNITS/KG/HR: 10000 INJECTION, SOLUTION INTRAVENOUS at 16:06

## 2023-11-01 RX ADMIN — HEPARIN SODIUM 2900 UNITS: 1000 INJECTION INTRAVENOUS; SUBCUTANEOUS at 18:31

## 2023-11-01 RX ADMIN — SODIUM CHLORIDE, PRESERVATIVE FREE 10 ML: 5 INJECTION INTRAVENOUS at 09:22

## 2023-11-01 RX ADMIN — ATORVASTATIN CALCIUM 20 MG: 20 TABLET, FILM COATED ORAL at 18:37

## 2023-11-01 RX ADMIN — DOCUSATE SODIUM 100 MG: 100 CAPSULE, LIQUID FILLED ORAL at 09:22

## 2023-11-01 RX ADMIN — HEPARIN SODIUM 19 UNITS/KG/HR: 10000 INJECTION, SOLUTION INTRAVENOUS at 18:37

## 2023-11-01 RX ADMIN — ACETAMINOPHEN 650 MG: 325 TABLET ORAL at 20:08

## 2023-11-01 ASSESSMENT — PAIN SCALES - GENERAL
PAINLEVEL_OUTOF10: 0
PAINLEVEL_OUTOF10: 3
PAINLEVEL_OUTOF10: 0
PAINLEVEL_OUTOF10: 4

## 2023-11-01 ASSESSMENT — PAIN DESCRIPTION - DESCRIPTORS
DESCRIPTORS: ACHING
DESCRIPTORS: ACHING

## 2023-11-01 ASSESSMENT — PAIN DESCRIPTION - LOCATION: LOCATION: HIP

## 2023-11-01 ASSESSMENT — PAIN - FUNCTIONAL ASSESSMENT
PAIN_FUNCTIONAL_ASSESSMENT: ACTIVITIES ARE NOT PREVENTED
PAIN_FUNCTIONAL_ASSESSMENT: ACTIVITIES ARE NOT PREVENTED
PAIN_FUNCTIONAL_ASSESSMENT: 0-10

## 2023-11-01 ASSESSMENT — PAIN DESCRIPTION - ORIENTATION: ORIENTATION: LEFT

## 2023-11-01 NOTE — PROGRESS NOTES
Hospitalist Progress Note    Patient:  Emma Mesa      Unit/Bed:7K-04/004-A    YOB: 1934    MRN: [id number]       Acct: [de-identified]     PCP: LISA Womack CNP    Date of Admission: 10/26/2023    Assessment/Plan:    Acute nontraumatic L3 compression fracture, possibly pathologic, possibly secondary to osteoporosis; appreciate pain management input~plan Kyphoplasty @ L3 and [place] LESI at L4 currently tentatively scheduled to follow in the main OR on Wednesday  Primary hypertension, uncontrolled--on Cardizem; monitor  History of PE in 2013--Coumadin on hold; initiated heparin drip 10/31 as okay with pain management and stopped it 2:00 this morning in preparation for kyphoplasty   Hypothyroidism--treated  CODE STATUS--limited x4      Expected discharge date: Pending clinical course    Disposition:    [] Home       [] TCU       [] Rehab       [] Psych       [] SNF       [] [address]       [] Other-    Chief Complaint: Back pain    Hospital Course: Per H&P done 10/26/2023: \"The patient is a 80 y.o. female who presents with complaints of lower back pain that radiates to left hip, associated with numbness of her left leg and inability to ambulate. Patient is accompanied by her daughter. She reports a long standing history of back pain but is usually able to treat this at home with Ice/heat and tylenol. Patient denies any trauma, fall or injury. She states the pain was sudden in onset and very severe that she required EMS to bring her in. She denies any loss of yovani or bladder control but admits to recent constipation and needing to use suppositories over the last few days.   No saddle anesthesia\"    10/31--> hemodynamically stable, plan is possible kyphoplasty tomorrow; patient takes Coumadin at home secondary to history of PE, INR today at 1.43; message sent to pain management to see if we can initiate a heparin drip    11/1--> hemodynamically stable, planning

## 2023-11-01 NOTE — CARE COORDINATION
11/1/23, 12:43 PM EDT    DISCHARGE ON GOING EVALUATION    [facility] day: 2  Location: 7K-04/004-A Reason for admit: Intractable back pain [M54.9]  Intractable low back pain [M54.59]  Anticoagulated on Coumadin [Z79.01]  Acute exacerbation of chronic low back pain [M54.50, G89.29]  Vertebral fracture, osteoporotic, initial encounter ([address]) [M80.08XA]   Procedure:   10/26 CT Abd/Plv W: 1. A few nodular opacities are seen in the lingula measuring between 2 and 6 mm. Pelvic floor descent is seen that can suggest pelvic floor relaxation dysfunction. Marked osteopenia. Acute L3 compression fracture  Lumbar spinal stenosis with radiculopathy  Planning kyphoplasty 11/1/23  Barriers to Discharge:  holding [address] NPO for procedure kyphoplasty around 1500 today. SCDs. Lidocaine patch. + BM. Stopped Heparin gtt. PCP: LISA Womack CNP  Readmission Risk Score: 9.2%  Patient Goals/Plan/Treatment Preferences:   Flosie is from home alone; has RW and following for DME needs post procedure.

## 2023-11-01 NOTE — PROGRESS NOTES
[facility]  OCCUPATIONAL THERAPY MISSED TREATMENT NOTE  [facility] ORTHOPEDICS 7K  7K-04/004-A      Date: 2023  Patient Name: Sadaf Mckoy        CSN: [id number]   : 3/1/1934  (80 y.o.)  Gender: female   Referring Practitioner: Raad Mueller MD  Diagnosis: intractable back pain         REASON FOR MISSED TREATMENT: Patient Refused. Patient seated in bedside chair upon arrival; declining therapy at this time stating \"I have been up walking and will have surgery later today\". Patient requesting to rest at this time, will attempt next available time.

## 2023-11-01 NOTE — PLAN OF CARE
Problem: Discharge Planning  Goal: Discharge to home or other facility with appropriate resources  10/31/2023 2241 by Jose Rafael Amato RN  Outcome: Progressing  Flowsheets (Taken 10/31/2023 2241)  Discharge to home or other facility with appropriate resources:   Identify barriers to discharge with patient and caregiver   Arrange for needed discharge resources and transportation as appropriate   Identify discharge learning needs (meds, wound care, etc)     Problem: Safety - Adult  Goal: Free from fall injury  10/31/2023 2241 by Jose Rafael Amato RN  Outcome: Progressing  Flowsheets (Taken 10/31/2023 2241)  Free From Fall Injury:   Instruct family/caregiver on patient safety   Based on caregiver fall risk screen, instruct family/caregiver to ask for assistance with transferring infant if caregiver noted to have fall risk factors     Problem: ABCDS Injury Assessment  Goal: Absence of physical injury  10/31/2023 2241 by Jose Rafael Amato RN  Outcome: Progressing  Flowsheets (Taken 10/31/2023 2241)  Absence of Physical Injury: Implement safety measures based on patient assessment     Problem: Skin/Tissue Integrity  Goal: Absence of new skin breakdown  Description: 1. Monitor for areas of redness and/or skin breakdown  2. Assess vascular access sites hourly  3. Every 4-6 hours minimum:  Change oxygen saturation probe site  4. Every 4-6 hours:  If on nasal continuous positive airway pressure, respiratory therapy assess nares and determine need for appliance change or resting period.   10/31/2023 2241 by Jose Rafael Amato RN  Outcome: Progressing     Problem: Pain  Goal: Verbalizes/displays adequate comfort level or baseline comfort level  10/31/2023 2241 by Jose Rafael Amato RN  Outcome: Progressing  Flowsheets (Taken 10/31/2023 2241)  Verbalizes/displays adequate comfort level or baseline comfort level:   Encourage patient to monitor pain and request assistance   Assess pain using appropriate pain scale   Administer analgesics based on type and severity of pain and evaluate response   Implement non-pharmacological measures as appropriate and evaluate response     Problem: Musculoskeletal - Adult  Goal: Return mobility to safest level of function  10/31/2023 2241 by Shyam Clay RN  Outcome: Progressing  Flowsheets (Taken 10/31/2023 2241)  Return Mobility to Safest Level of Function:   Assess patient stability and activity tolerance for standing, transferring and ambulating with or without assistive devices   Assist with transfers and ambulation using safe patient handling equipment as needed   Ensure adequate protection for wounds/incisions during mobilization     Problem: Gastrointestinal - Adult  Goal: Maintains or returns to baseline bowel function  10/31/2023 2241 by Shyam Clay RN  Outcome: Progressing  Flowsheets (Taken 10/31/2023 2241)  Maintains or returns to baseline bowel function:   Encourage mobilization and activity   Assess bowel function   Encourage oral fluids to ensure adequate hydration     Care plan reviewed with patient. Patient  verbalizes understanding of the plan of care and contributes to goal setting.

## 2023-11-01 NOTE — PROGRESS NOTES
Pain Management   Progress Note      11/1/2023 3:14 PM     Dr. Hylton Reasons went to main OR with plan for Kyphoplasty and Left LESI but was told that due to many added on emergent cases that there were no openings available today in the OR for this procedure. Patient now scheduled for tomorrow at 9 am. I went and discussed this were her and her daughter and as they voiced disappointment they also voiced understanding. Regular diet was resumed with plan for NPO at midnight tonight.        LISA Parra - CNP, 11/1/2023, 3:14 PM

## 2023-11-01 NOTE — PROGRESS NOTES
[facility]  INPATIENT PHYSICAL THERAPY  DAILY NOTE  [facility] 7K - 7K-04/004-A      Time In: 3268  Time Out: 3688  Timed Code Treatment Minutes: 45 Minutes  Minutes: 38          Date: 2023  Patient Name: Chio Dewtit,  Gender:  female        MRN: [id number]  : 3/1/1934  (80 y.o.)     Referring Practitioner: Darryle Ford, MD  Diagnosis: Intractable back pain  Additional Pertinent Hx: Per EMR:The patient is a 80 y.o. female who presents with complaints of lower back pain that radiates to left hip, associated with numbness of her left leg and inability to ambulate. Patient is accompanied by her daughter. She reports a long standing history of back pain but is usually able to treat this at home with Ice/heat and tylenol. Patient denies any trauma, fall or injury. She states the pain was sudden in onset and very severe that she required EMS to bring her in. She denies any loss of yovani or bladder control but admits to recent constipation and needing to use suppositories over the last few days. MRI of L spine shows acute fracture of the inferior endplate of L3. The height loss is 10%. This is most likely pathologic secondary to underlying osteoporosis. Prior Level of Function:  Lives With: Alone  Type of Home: Apartment  Home Layout: One level  Home Access: Level entry (threshold)  Home Equipment: Walker, rolling (walking stick to get mail)   Bathroom Shower/Tub: Tub/Shower unit (sponge bathe x 1 week)  Bathroom Toilet: Handicap height  Bathroom Equipment: Commode    Receives Help From: Family  ADL Assistance: Independent  Homemaking Assistance: Independent  Ambulation Assistance: Independent  Transfer Assistance: Independent  Active : Yes  Additional Comments: Patient reports she was active and independent PTA without use of an AD. Usually patient is able to complete all ADLs but recently has had help from dtr and son for bathing due to back pain.  Patient reports she has

## 2023-11-02 ENCOUNTER — APPOINTMENT (OUTPATIENT)
Dept: GENERAL RADIOLOGY | Age: 88
DRG: 479 | End: 2023-11-02
Payer: MEDICARE

## 2023-11-02 ENCOUNTER — ANESTHESIA (OUTPATIENT)
Dept: OPERATING ROOM | Age: 88
End: 2023-11-02
Payer: MEDICARE

## 2023-11-02 ENCOUNTER — ANESTHESIA EVENT (OUTPATIENT)
Dept: OPERATING ROOM | Age: 88
End: 2023-11-02
Payer: MEDICARE

## 2023-11-02 PROBLEM — M48.56XA PATHOLOGIC COMPRESSION FRACTURE OF LUMBAR VERTEBRA (HCC): Status: ACTIVE | Noted: 2023-10-30

## 2023-11-02 PROBLEM — M80.00XA AGE-RELATED OSTEOPOROSIS WITH CURRENT PATHOLOGICAL FRACTURE: Status: ACTIVE | Noted: 2023-10-30

## 2023-11-02 PROBLEM — M48.061 SPINAL STENOSIS OF LUMBAR REGION WITHOUT NEUROGENIC CLAUDICATION: Status: ACTIVE | Noted: 2023-10-30

## 2023-11-02 LAB
ANION GAP SERPL CALC-SCNC: 11 MEQ/L (ref 8–16)
BASOPHILS ABSOLUTE: 0 THOU/MM3 (ref 0–0.1)
BASOPHILS NFR BLD AUTO: 0.4 %
BUN SERPL-MCNC: 13 MG/DL (ref 7–22)
CALCIUM SERPL-MCNC: 9.3 MG/DL (ref 8.5–10.5)
CHLORIDE SERPL-SCNC: 98 MEQ/L (ref 98–111)
CO2 SERPL-SCNC: 29 MEQ/L (ref 23–33)
CREAT SERPL-MCNC: 0.6 MG/DL (ref 0.4–1.2)
DEPRECATED RDW RBC AUTO: 43 FL (ref 35–45)
EOSINOPHIL NFR BLD AUTO: 1.6 %
EOSINOPHILS ABSOLUTE: 0.2 THOU/MM3 (ref 0–0.4)
ERYTHROCYTE [DISTWIDTH] IN BLOOD BY AUTOMATED COUNT: 13 % (ref 11.5–14.5)
GFR SERPL CREATININE-BSD FRML MDRD: > 60 ML/MIN/1.73M2
GLUCOSE SERPL-MCNC: 88 MG/DL (ref 70–108)
HCT VFR BLD AUTO: 41.4 % (ref 37–47)
HEPARIN UNFRACTIONATED: 0.46 U/ML (ref 0.3–0.7)
HEPARIN UNFRACTIONATED: 0.6 U/ML (ref 0.3–0.7)
HEPARIN UNFRACTIONATED: 1.4 U/ML (ref 0.3–0.7)
HGB BLD-MCNC: 13.4 GM/DL (ref 12–16)
IMM GRANULOCYTES # BLD AUTO: 0.03 THOU/MM3 (ref 0–0.07)
IMM GRANULOCYTES NFR BLD AUTO: 0.3 %
INR PPP: 1.04 (ref 0.85–1.13)
INR PPP: 1.17 (ref 0.85–1.13)
LYMPHOCYTES ABSOLUTE: 4.1 THOU/MM3 (ref 1–4.8)
LYMPHOCYTES NFR BLD AUTO: 42.5 %
MCH RBC QN AUTO: 29.3 PG (ref 26–33)
MCHC RBC AUTO-ENTMCNC: 32.4 GM/DL (ref 32.2–35.5)
MCV RBC AUTO: 90.4 FL (ref 81–99)
MONOCYTES ABSOLUTE: 0.8 THOU/MM3 (ref 0.4–1.3)
MONOCYTES NFR BLD AUTO: 8.8 %
NEUTROPHILS NFR BLD AUTO: 46.4 %
NRBC BLD AUTO-RTO: 0 /100 WBC
PLATELET # BLD AUTO: 221 THOU/MM3 (ref 130–400)
PMV BLD AUTO: 11.3 FL (ref 9.4–12.4)
POTASSIUM SERPL-SCNC: 4 MEQ/L (ref 3.5–5.2)
RBC # BLD AUTO: 4.58 MILL/MM3 (ref 4.2–5.4)
REASON FOR REJECTION: NORMAL
REJECTED TEST: NORMAL
SEGMENTED NEUTROPHILS ABSOLUTE COUNT: 4.5 THOU/MM3 (ref 1.8–7.7)
SODIUM SERPL-SCNC: 138 MEQ/L (ref 135–145)
WBC # BLD AUTO: 9.6 THOU/MM3 (ref 4.8–10.8)

## 2023-11-02 PROCEDURE — 85025 COMPLETE CBC W/AUTO DIFF WBC: CPT

## 2023-11-02 PROCEDURE — 6360000002 HC RX W HCPCS: Performed by: NURSE PRACTITIONER

## 2023-11-02 PROCEDURE — 7100000001 HC PACU RECOVERY - ADDTL 15 MIN: Performed by: PAIN MEDICINE

## 2023-11-02 PROCEDURE — 3600000012 HC SURGERY LEVEL 2 ADDTL 15MIN: Performed by: PAIN MEDICINE

## 2023-11-02 PROCEDURE — 3700000000 HC ANESTHESIA ATTENDED CARE: Performed by: PAIN MEDICINE

## 2023-11-02 PROCEDURE — 96366 THER/PROPH/DIAG IV INF ADDON: CPT

## 2023-11-02 PROCEDURE — 2580000003 HC RX 258: Performed by: HOSPITALIST

## 2023-11-02 PROCEDURE — 6360000002 HC RX W HCPCS: Performed by: PAIN MEDICINE

## 2023-11-02 PROCEDURE — 2500000003 HC RX 250 WO HCPCS: Performed by: NURSE ANESTHETIST, CERTIFIED REGISTERED

## 2023-11-02 PROCEDURE — 3700000001 HC ADD 15 MINUTES (ANESTHESIA): Performed by: PAIN MEDICINE

## 2023-11-02 PROCEDURE — 6370000000 HC RX 637 (ALT 250 FOR IP): Performed by: NURSE PRACTITIONER

## 2023-11-02 PROCEDURE — 22514 PERQ VERTEBRAL AUGMENTATION: CPT | Performed by: PAIN MEDICINE

## 2023-11-02 PROCEDURE — 99233 SBSQ HOSP IP/OBS HIGH 50: CPT | Performed by: NURSE PRACTITIONER

## 2023-11-02 PROCEDURE — C1894 INTRO/SHEATH, NON-LASER: HCPCS | Performed by: PAIN MEDICINE

## 2023-11-02 PROCEDURE — A4216 STERILE WATER/SALINE, 10 ML: HCPCS | Performed by: PAIN MEDICINE

## 2023-11-02 PROCEDURE — 85520 HEPARIN ASSAY: CPT

## 2023-11-02 PROCEDURE — 2720000010 HC SURG SUPPLY STERILE: Performed by: PAIN MEDICINE

## 2023-11-02 PROCEDURE — 3600000002 HC SURGERY LEVEL 2 BASE: Performed by: PAIN MEDICINE

## 2023-11-02 PROCEDURE — 2580000003 HC RX 258: Performed by: PAIN MEDICINE

## 2023-11-02 PROCEDURE — 6360000002 HC RX W HCPCS: Performed by: HOSPITALIST

## 2023-11-02 PROCEDURE — 6360000004 HC RX CONTRAST MEDICATION: Performed by: PAIN MEDICINE

## 2023-11-02 PROCEDURE — 7100000000 HC PACU RECOVERY - FIRST 15 MIN: Performed by: PAIN MEDICINE

## 2023-11-02 PROCEDURE — 85610 PROTHROMBIN TIME: CPT

## 2023-11-02 PROCEDURE — 6370000000 HC RX 637 (ALT 250 FOR IP): Performed by: HOSPITALIST

## 2023-11-02 PROCEDURE — 0QS03ZZ REPOSITION LUMBAR VERTEBRA, PERCUTANEOUS APPROACH: ICD-10-PCS | Performed by: PAIN MEDICINE

## 2023-11-02 PROCEDURE — 2709999900 HC NON-CHARGEABLE SUPPLY: Performed by: PAIN MEDICINE

## 2023-11-02 PROCEDURE — 3E0R33Z INTRODUCTION OF ANTI-INFLAMMATORY INTO SPINAL CANAL, PERCUTANEOUS APPROACH: ICD-10-PCS | Performed by: PAIN MEDICINE

## 2023-11-02 PROCEDURE — 80048 BASIC METABOLIC PNL TOTAL CA: CPT

## 2023-11-02 PROCEDURE — 1200000000 HC SEMI PRIVATE

## 2023-11-02 PROCEDURE — 0QU03JZ SUPPLEMENT LUMBAR VERTEBRA WITH SYNTHETIC SUBSTITUTE, PERCUTANEOUS APPROACH: ICD-10-PCS | Performed by: PAIN MEDICINE

## 2023-11-02 PROCEDURE — C1713 ANCHOR/SCREW BN/BN,TIS/BN: HCPCS | Performed by: PAIN MEDICINE

## 2023-11-02 PROCEDURE — 36415 COLL VENOUS BLD VENIPUNCTURE: CPT

## 2023-11-02 PROCEDURE — 0Q903ZX DRAINAGE OF LUMBAR VERTEBRA, PERCUTANEOUS APPROACH, DIAGNOSTIC: ICD-10-PCS | Performed by: PAIN MEDICINE

## 2023-11-02 PROCEDURE — 6360000002 HC RX W HCPCS: Performed by: NURSE ANESTHETIST, CERTIFIED REGISTERED

## 2023-11-02 DEVICE — CEMENT C01A KYPHX HV-R BONE CEMENT EN
Type: IMPLANTABLE DEVICE | Status: FUNCTIONAL
Brand: KYPHON® HV-R® BONE CEMENT

## 2023-11-02 RX ORDER — PROPOFOL 10 MG/ML
INJECTION, EMULSION INTRAVENOUS PRN
Status: DISCONTINUED | OUTPATIENT
Start: 2023-11-02 | End: 2023-11-02 | Stop reason: SDUPTHER

## 2023-11-02 RX ORDER — MEPERIDINE HYDROCHLORIDE 25 MG/ML
12.5 INJECTION INTRAMUSCULAR; INTRAVENOUS; SUBCUTANEOUS EVERY 5 MIN PRN
Status: DISCONTINUED | OUTPATIENT
Start: 2023-11-02 | End: 2023-11-02 | Stop reason: HOSPADM

## 2023-11-02 RX ORDER — CEFAZOLIN SODIUM 1 G/3ML
INJECTION, POWDER, FOR SOLUTION INTRAMUSCULAR; INTRAVENOUS PRN
Status: DISCONTINUED | OUTPATIENT
Start: 2023-11-02 | End: 2023-11-02 | Stop reason: SDUPTHER

## 2023-11-02 RX ORDER — ROCURONIUM BROMIDE 10 MG/ML
INJECTION, SOLUTION INTRAVENOUS PRN
Status: DISCONTINUED | OUTPATIENT
Start: 2023-11-02 | End: 2023-11-02 | Stop reason: SDUPTHER

## 2023-11-02 RX ORDER — FENTANYL CITRATE 50 UG/ML
50 INJECTION, SOLUTION INTRAMUSCULAR; INTRAVENOUS EVERY 5 MIN PRN
Status: DISCONTINUED | OUTPATIENT
Start: 2023-11-02 | End: 2023-11-02 | Stop reason: HOSPADM

## 2023-11-02 RX ORDER — FENTANYL CITRATE 50 UG/ML
INJECTION, SOLUTION INTRAMUSCULAR; INTRAVENOUS PRN
Status: DISCONTINUED | OUTPATIENT
Start: 2023-11-02 | End: 2023-11-02 | Stop reason: SDUPTHER

## 2023-11-02 RX ORDER — ONDANSETRON 2 MG/ML
4 INJECTION INTRAMUSCULAR; INTRAVENOUS
Status: DISCONTINUED | OUTPATIENT
Start: 2023-11-02 | End: 2023-11-02 | Stop reason: HOSPADM

## 2023-11-02 RX ORDER — BUPIVACAINE HYDROCHLORIDE 5 MG/ML
INJECTION, SOLUTION PERINEURAL PRN
Status: DISCONTINUED | OUTPATIENT
Start: 2023-11-02 | End: 2023-11-02 | Stop reason: ALTCHOICE

## 2023-11-02 RX ORDER — SODIUM CHLORIDE 9 MG/ML
INJECTION, SOLUTION INTRAVENOUS PRN
Status: DISCONTINUED | OUTPATIENT
Start: 2023-11-02 | End: 2023-11-02 | Stop reason: HOSPADM

## 2023-11-02 RX ORDER — ONDANSETRON 2 MG/ML
INJECTION INTRAMUSCULAR; INTRAVENOUS PRN
Status: DISCONTINUED | OUTPATIENT
Start: 2023-11-02 | End: 2023-11-02 | Stop reason: SDUPTHER

## 2023-11-02 RX ORDER — SCOLOPAMINE TRANSDERMAL SYSTEM 1 MG/1
1 PATCH, EXTENDED RELEASE TRANSDERMAL
Status: DISCONTINUED | OUTPATIENT
Start: 2023-11-02 | End: 2023-11-03 | Stop reason: HOSPADM

## 2023-11-02 RX ORDER — SODIUM CHLORIDE 9 MG/ML
INJECTION, SOLUTION INTRAMUSCULAR; INTRAVENOUS; SUBCUTANEOUS PRN
Status: DISCONTINUED | OUTPATIENT
Start: 2023-11-02 | End: 2023-11-02 | Stop reason: ALTCHOICE

## 2023-11-02 RX ORDER — SODIUM CHLORIDE 0.9 % (FLUSH) 0.9 %
5-40 SYRINGE (ML) INJECTION EVERY 12 HOURS SCHEDULED
Status: DISCONTINUED | OUTPATIENT
Start: 2023-11-02 | End: 2023-11-02 | Stop reason: HOSPADM

## 2023-11-02 RX ORDER — LIDOCAINE HCL/PF 100 MG/5ML
SYRINGE (ML) INJECTION PRN
Status: DISCONTINUED | OUTPATIENT
Start: 2023-11-02 | End: 2023-11-02 | Stop reason: SDUPTHER

## 2023-11-02 RX ORDER — DEXAMETHASONE SODIUM PHOSPHATE 10 MG/ML
INJECTION, EMULSION INTRAMUSCULAR; INTRAVENOUS PRN
Status: DISCONTINUED | OUTPATIENT
Start: 2023-11-02 | End: 2023-11-02 | Stop reason: SDUPTHER

## 2023-11-02 RX ORDER — WARFARIN SODIUM 3 MG/1
6 TABLET ORAL
Status: COMPLETED | OUTPATIENT
Start: 2023-11-02 | End: 2023-11-02

## 2023-11-02 RX ORDER — SODIUM CHLORIDE 0.9 % (FLUSH) 0.9 %
5-40 SYRINGE (ML) INJECTION PRN
Status: DISCONTINUED | OUTPATIENT
Start: 2023-11-02 | End: 2023-11-02 | Stop reason: HOSPADM

## 2023-11-02 RX ORDER — SUCCINYLCHOLINE/SOD CL,ISO/PF 200MG/10ML
SYRINGE (ML) INTRAVENOUS PRN
Status: DISCONTINUED | OUTPATIENT
Start: 2023-11-02 | End: 2023-11-02 | Stop reason: SDUPTHER

## 2023-11-02 RX ADMIN — PROPOFOL 100 MG: 10 INJECTION, EMULSION INTRAVENOUS at 09:39

## 2023-11-02 RX ADMIN — ONDANSETRON 4 MG: 2 INJECTION INTRAMUSCULAR; INTRAVENOUS at 12:09

## 2023-11-02 RX ADMIN — SODIUM CHLORIDE, PRESERVATIVE FREE 10 ML: 5 INJECTION INTRAVENOUS at 08:05

## 2023-11-02 RX ADMIN — Medication 100 MG: at 09:39

## 2023-11-02 RX ADMIN — ONDANSETRON 4 MG: 2 INJECTION INTRAMUSCULAR; INTRAVENOUS at 09:48

## 2023-11-02 RX ADMIN — WARFARIN SODIUM 6 MG: 3 TABLET ORAL at 17:08

## 2023-11-02 RX ADMIN — Medication 140 MG: at 09:39

## 2023-11-02 RX ADMIN — SENNOSIDES 8.6 MG: 8.6 TABLET, FILM COATED ORAL at 19:57

## 2023-11-02 RX ADMIN — HEPARIN SODIUM 16 UNITS/KG/HR: 10000 INJECTION, SOLUTION INTRAVENOUS at 14:00

## 2023-11-02 RX ADMIN — DEXAMETHASONE SODIUM PHOSPHATE 8 MG: 10 INJECTION, EMULSION INTRAMUSCULAR; INTRAVENOUS at 09:48

## 2023-11-02 RX ADMIN — FENTANYL CITRATE 25 MCG: 50 INJECTION, SOLUTION INTRAMUSCULAR; INTRAVENOUS at 09:49

## 2023-11-02 RX ADMIN — ACETAMINOPHEN 650 MG: 325 TABLET ORAL at 16:13

## 2023-11-02 RX ADMIN — ATORVASTATIN CALCIUM 20 MG: 20 TABLET, FILM COATED ORAL at 17:08

## 2023-11-02 RX ADMIN — SODIUM CHLORIDE: 9 INJECTION, SOLUTION INTRAVENOUS at 09:32

## 2023-11-02 RX ADMIN — SUGAMMADEX 200 MG: 100 INJECTION, SOLUTION INTRAVENOUS at 10:42

## 2023-11-02 RX ADMIN — CEFAZOLIN 2 G: 1 INJECTION, POWDER, FOR SOLUTION INTRAMUSCULAR; INTRAVENOUS at 09:43

## 2023-11-02 RX ADMIN — ROCURONIUM BROMIDE 10 MG: 10 INJECTION INTRAVENOUS at 10:11

## 2023-11-02 RX ADMIN — ROCURONIUM BROMIDE 30 MG: 10 INJECTION INTRAVENOUS at 09:44

## 2023-11-02 RX ADMIN — DOCUSATE SODIUM 100 MG: 100 CAPSULE, LIQUID FILLED ORAL at 19:57

## 2023-11-02 ASSESSMENT — PAIN SCALES - GENERAL
PAINLEVEL_OUTOF10: 0
PAINLEVEL_OUTOF10: 5

## 2023-11-02 ASSESSMENT — PAIN DESCRIPTION - LOCATION: LOCATION: BACK

## 2023-11-02 ASSESSMENT — PAIN DESCRIPTION - FREQUENCY: FREQUENCY: CONTINUOUS

## 2023-11-02 ASSESSMENT — PAIN DESCRIPTION - PAIN TYPE: TYPE: SURGICAL PAIN

## 2023-11-02 ASSESSMENT — PAIN - FUNCTIONAL ASSESSMENT
PAIN_FUNCTIONAL_ASSESSMENT: 0-10
PAIN_FUNCTIONAL_ASSESSMENT: ACTIVITIES ARE NOT PREVENTED

## 2023-11-02 ASSESSMENT — PAIN DESCRIPTION - ONSET: ONSET: ON-GOING

## 2023-11-02 ASSESSMENT — PAIN DESCRIPTION - DESCRIPTORS
DESCRIPTORS: SORE
DESCRIPTORS: SORE

## 2023-11-02 ASSESSMENT — PAIN DESCRIPTION - ORIENTATION: ORIENTATION: LOWER

## 2023-11-02 NOTE — PROGRESS NOTES
this facility use dose modulation, iterative reconstruction, and/or weight based dosing when appropriate to reduce the radiation dose to as low as reasonably achievable. CONTRAST: 80  cc of Isovue-370  intravenously FINDINGS: A few nodular opacities are seen in the lingula measuring between 2 and 6 mm. A small hiatal hernia is seen. Coronary calcifications. IVC filter is noted. Marked pancreatic atrophy. Colonic diverticulosis. Stool is present within the colon. Aortoiliac calcifications. The liver, gallbladder, biliary tree, adrenal glands, spleen, and kidneys are unremarkable. No bowel obstruction or acute inflammatory bowel process. The abdominal aorta is not aneurysmal. No significantly enlarged lymph nodes are seen. The bladder is grossly unremarkable. The uterus is surgically absent. There is pelvic floor descent. Bones: The bones are markedly demineralized. Degenerative changes of the visualized lumbar spine. Degenerative changes of the SI joints and both hips. 1. A few nodular opacities are seen in the lingula measuring between 2 and 6 mm. 2. Pelvic floor descent is seen that can suggest pelvic floor relaxation dysfunction. 3. Marked osteopenia. 4. Other findings as described above **This report has been created using voice recognition software. It may contain minor errors which are inherent in voice recognition technology. ** Final report electronically signed by Dr Kathy Cisneros on 10/26/2023 3:51 PM    CT LUMBAR RECONSTRUCTION WO POST PROCESS    Result Date: 10/26/2023  PROCEDURE: CT LUMBAR RECONSTRUCTION WO POST PROCESS CLINICAL INFORMATION: Trauma. Back pain. COMPARISON: Lumbar spine x-rays 4/5/2022. TECHNIQUE: 3 mm axial CT images were reconstructed through the lumbar spine. These are reconstructed from the patient's abdomen and pelvis CT. IV contrast is present. Sagittal and coronal reconstructions were obtained.  All CT scans at this facility use dose modulation, iterative reconstruction, and/or weight-based dosing when appropriate to reduce radiation dose to as low as reasonably achievable. FINDINGS: There is a mild dextroscoliosis. There are no compression fractures. There is some scalloping of the inferior endplate of L3. This appears stable. There is loss of disc height throughout. This is most pronounced at the thoracolumbar junction. There is some endplate sclerosis. There are facet degenerative changes throughout. No suspicious osseous lesions are present. The posterior elements are within appropriate limits. There are no fractures of posterior elements. There is demineralization. There are no gross abnormalities within the spinal canal. On the axial images, there are no fractures. There is no severe spinal canal stenosis at any level. There are no suspicious findings in the visualized aspects of the retroperitoneum and paraspinal soft tissues. No acute findings in the lumbar spine. **This report has been created using voice recognition software. It may contain minor errors which are inherent in voice recognition technology. ** Final report electronically signed by Dr. Jesusita Rios on 10/26/2023 3:41 PM      DVT prophylaxis: [] Lovenox  **Coumadin on hold                                 [] SCDs                                 [] SQ Heparin                                 [] Encourage ambulation           [] Already on Anticoagulation     Code Status: Limited    PT/OT Eval Status:  On case    Tele:   [] yes             [x] no    Active Hospital Problems    Diagnosis Date Noted    Intractable low back pain [M54.59] 10/30/2023    Acute exacerbation of chronic low back pain [M54.50, G89.29] 10/30/2023    Closed compression fracture of third lumbar vertebra ([address]) [S32.030A] 10/30/2023    Lumbar radiculitis [M54.16] 10/30/2023    Spinal stenosis of lumbar region with neurogenic claudication [M48.062] 10/30/2023    Vertebral fracture, osteoporotic, initial encounter ([address]) Regino العلي 10/30/2023

## 2023-11-02 NOTE — ANESTHESIA PRE PROCEDURE
Department of Anesthesiology  Preprocedure Note       Name:  Osbaldo Guerra   Age:  80 y.o.  :  3/1/1934                                          MRN:  [id number]         Date:  2023      Surgeon: Julisa Galvez):  Ector Dakins, MD    Procedure: Procedure(s):  Lumbar 3 Kyphoplasty and Left Lumbar 4 Epidural Steroid Injection    Medications prior to admission:   Prior to Admission medications    Medication Sig Start Date End Date Taking? Authorizing Provider   betamethasone valerate (VALISONE) 0.1 % cream Apply topically 2 times daily 23   Sally Quigley MD   levothyroxine (SYNTHROID) 125 MCG tablet Take 1 tablet by mouth Daily Indications: Impaired Thyroid Function Pt wants only synthroid brand name  Takes MTWTF only.  5-14-18. 3/30/23   LISA Aguilera CNP   atorvastatin (LIPITOR) 20 MG tablet Take 1 tablet by mouth every evening Indications: Blood Cholesterol Abnormal 3/30/23   Kumar Glass APRN - CNP   dilTIAZem (DILACOR XR) 180 MG extended release capsule Take 2 capsules by mouth daily Indications: High Blood Pressure Disorder 3/30/23   LISA Aguilera CNP   D-MANNOSE PO Take 1 tablet by mouth in the morning and at bedtime    Sally Quigley MD   warfarin (TOVEN) 2 MG tablet use as directed by [facility] coumadin clinic 180 tabs = 90 days 23   Jina Johns MD   Handicap Placard MISC by Does not apply route Duration: 3 years  Physical Deconditioning 22   LISA Aguilera CNP   ondansetron (ZOFRAN ODT) 4 MG disintegrating tablet Take 1 tablet by mouth every 8 hours as needed for Nausea or Vomiting  Patient not taking: Reported on 4/10/2023 12/20/21   LISA Oliva CNP   Polyethyl Glycol-Propyl Glycol (SYSTANE ULTRA OP) Apply 1 drop to eye 2 times daily    Sally Quigley MD   acetaminophen (TYLENOL) 500 MG tablet Take 1 tablet by mouth every 6 hours as needed for Pain or Fever Don't take more than 3,000 mg each day, including

## 2023-11-02 NOTE — PROGRESS NOTES
[facility]  PHYSICAL THERAPY MISSED TREATMENT NOTE  [facility] 7K    Date: 2023  Patient Name: Deonte Alvarenga        MRN: [id number]   : 3/1/1934  (80 y.o.)  Gender: female   Referring Practitioner: Lobo Rashid MD  Diagnosis: Intractable back pain         REASON FOR MISSED TREATMENT:  Per RN denied PT session this morning d/t patient having a Kyphoplasty at 9 this morning. Will check back at next available date as time allows .

## 2023-11-02 NOTE — PROGRESS NOTES
1054 pt arrived to pacu, awakens to voice. Respirations unlabored on 2L NC. Sites CDI x1. VSS. Pt denies pain at this time  1109 pt awake in bed, denies pain at this time. VSS  1120 pt states pain 5/10 and tolerable at this time. Denies need for pain medication at this time. VSS  1124 pt states pain 5/10 and tolerable at this time. Meets criteria for discharge from pacu at this time. 1132 Pt transported to [facility] in stable condition.  No family here per pt

## 2023-11-02 NOTE — PLAN OF CARE
Problem: Discharge Planning  Goal: Discharge to home or other facility with appropriate resources  11/2/2023 1057 by Mirza Oswald RN  Outcome: Progressing  Flowsheets (Taken 11/2/2023 1057)  Discharge to home or other facility with appropriate resources:   Identify barriers to discharge with patient and caregiver   Identify discharge learning needs (meds, wound care, etc)   Refer to discharge planning if patient needs post-hospital services based on physician order or complex needs related to functional status, cognitive ability or social support system   Arrange for needed discharge resources and transportation as appropriate     Problem: Safety - Adult  Goal: Free from fall injury  11/2/2023 1057 by Mirza Oswald RN  Outcome: Progressing  Flowsheets (Taken 11/2/2023 1057)  Free From Fall Injury: Instruct family/caregiver on patient safety     Problem: ABCDS Injury Assessment  Goal: Absence of physical injury  11/2/2023 1057 by Mirza Oswald RN  Outcome: Progressing  Flowsheets (Taken 11/2/2023 1057)  Absence of Physical Injury: Implement safety measures based on patient assessment     Problem: Skin/Tissue Integrity  Goal: Absence of new skin breakdown  Description: 1. Monitor for areas of redness and/or skin breakdown  2. Assess vascular access sites hourly  3. Every 4-6 hours minimum:  Change oxygen saturation probe site  4. Every 4-6 hours:  If on nasal continuous positive airway pressure, respiratory therapy assess nares and determine need for appliance change or resting period.   11/2/2023 1057 by Mirza Oswald RN  Outcome: Progressing     Problem: Pain  Goal: Verbalizes/displays adequate comfort level or baseline comfort level  11/2/2023 1057 by Mirza Oswald RN  Outcome: Progressing  Flowsheets (Taken 11/2/2023 1057)  Verbalizes/displays adequate comfort level or baseline comfort level:   Encourage patient to monitor pain and request assistance   Administer analgesics based on type

## 2023-11-02 NOTE — CARE COORDINATION
11/2/23, 2:59 PM EDT    DISCHARGE ON GOING EVALUATION    [facility] day: 3  Location: 7K-04/004-A Reason for admit: Intractable back pain [M54.9]  Intractable low back pain [M54.59]  Anticoagulated on Coumadin [Z79.01]  Acute exacerbation of chronic low back pain [M54.50, G89.29]  Vertebral fracture, osteoporotic, initial encounter ([address]) [M80.08XA]     Procedure:   10/26 CT Abd/Plv W: 1. A few nodular opacities are seen in the lingula measuring between 2 and 6 mm. Pelvic floor descent is seen that can suggest pelvic floor relaxation dysfunction. Marked osteopenia. Acute L3 compression fracture  Lumbar spinal stenosis with radiculopathy  11/2 Lumbar 3 Kyphoplasty and L Lumbar 4 Epidural steroid injection. Barriers to Discharge: Hospitalist, ortho surgery and pain management following. PT/OT. Heparin gtt. IV zofran. Regular diet, up as tolerated, anticipated dc tomorrow. PCP: LISA Lagos - CNP  Readmission Risk Score: 8.5%    Patient Goals/Plan/Treatment Preferences: From home alone. No therapy tx today due to sugery. Will need to assess DME and dispo needs after therapy eval tomorrow.

## 2023-11-02 NOTE — ANESTHESIA POSTPROCEDURE EVALUATION
Department of Anesthesiology  Postprocedure Note    Patient: Mary Davila  MRN: [id number]  YOB: 1934  Date of evaluation: 11/2/2023      Procedure Summary     Date: 11/02/23 Room / Location: [facility] 06 / [address] [place] [address]    Anesthesia Start: 0935 Anesthesia Stop: 2774    Procedure: Lumbar 3 Kyphoplasty and Left Lumbar 4 Epidural Steroid Injection (Left) Diagnosis:       Compression fracture of L3 lumbar vertebra, open, initial encounter ([address])      (Compression fracture of L3 lumbar vertebra, open, initial encounter ([address]) [S32.030B])    Surgeons: Padilla Doherty MD Responsible Provider: Ganesh Mejia DO    Anesthesia Type: general ASA Status: 3          Anesthesia Type: No value filed.     Harper Phase I: Harper Score: 9    Harper Phase II:        Anesthesia Post Evaluation    Patient location during evaluation: PACU  Patient participation: complete - patient participated  Level of consciousness: awake and alert  Pain score: 2  Airway patency: patent  Nausea & Vomiting: no nausea and no vomiting  Complications: no  Cardiovascular status: hemodynamically stable and blood pressure returned to baseline  Respiratory status: spontaneous ventilation, room air and acceptable  Hydration status: stable  Pain management: adequate and satisfactory to patient

## 2023-11-02 NOTE — PROGRESS NOTES
Warfarin Pharmacy Consult Note    Cheri Marquez is a 80 y.o. female for whom pharmacy has been asked to manage warfarin therapy. Consulting provider: Aliyah Gonzalez CNP  Warfarin dose PTA: 4 mg daily  Indication: Hx of PE  Target INR: 2.0-3.0  Warfarin followed by: [facility]    Recent Labs     11/01/23  0414 11/01/23  1226 11/02/23  0531   INR 1.36* 0.76* 1.17*     Recent Labs     10/31/23  0350 11/01/23  0414 11/02/23  0531   HGB 13.0 13.6 13.4    221 221     Concurrent anticoagulants/antiplatelets: heparin drip  Significant warfarin drug-drug interactions: levothyroxine    Date INR Warfarin Dose   11/2/2023 1.17 6 mg                                   INR will be monitored routinely until therapeutic INR is achieved. Pharmacy will continue to follow.  Thank you for the consult,     Chinyere Reynoso PharmD, BCPS  11/2/2023  1:10 PM

## 2023-11-02 NOTE — PLAN OF CARE
Problem: Discharge Planning  Goal: Discharge to home or other facility with appropriate resources  Outcome: Progressing  Flowsheets (Taken 11/1/2023 2129)  Discharge to home or other facility with appropriate resources:   Identify barriers to discharge with patient and caregiver   Arrange for needed discharge resources and transportation as appropriate   Identify discharge learning needs (meds, wound care, etc)     Problem: Safety - Adult  Goal: Free from fall injury  Outcome: Progressing  Flowsheets (Taken 11/1/2023 2129)  Free From Fall Injury:   Instruct family/caregiver on patient safety   Based on caregiver fall risk screen, instruct family/caregiver to ask for assistance with transferring infant if caregiver noted to have fall risk factors     Problem: ABCDS Injury Assessment  Goal: Absence of physical injury  Outcome: Progressing  Flowsheets (Taken 11/1/2023 2129)  Absence of Physical Injury: Implement safety measures based on patient assessment     Problem: Skin/Tissue Integrity  Goal: Absence of new skin breakdown  Description: 1. Monitor for areas of redness and/or skin breakdown  2. Assess vascular access sites hourly  3. Every 4-6 hours minimum:  Change oxygen saturation probe site  4. Every 4-6 hours:  If on nasal continuous positive airway pressure, respiratory therapy assess nares and determine need for appliance change or resting period.   Outcome: Progressing     Problem: Pain  Goal: Verbalizes/displays adequate comfort level or baseline comfort level  Outcome: Progressing  Flowsheets (Taken 11/1/2023 2129)  Verbalizes/displays adequate comfort level or baseline comfort level:   Encourage patient to monitor pain and request assistance   Assess pain using appropriate pain scale   Administer analgesics based on type and severity of pain and evaluate response   Implement non-pharmacological measures as appropriate and evaluate response     Problem: Musculoskeletal - Adult  Goal: Return mobility to

## 2023-11-02 NOTE — BRIEF OP NOTE
Brief Postoperative Note      Patient: Sagar Bishop  YOB: 1934  MRN: [id number]    Date of Procedure: 11/2/2023    Pre-Op Dx;    1- Pathologic  compression fracture @ L3  2- Osteoporosis with current pathologic compression fracture  3-Lumbar radiculitis  4-Lumbar spinal stenosis  5-Intractable back pain    Post-Op Diagnosis: Same       Procedure(s):  Lumbar 3 Kyphoplasty and Left Lumbar 4 Epidural Steroid Injection    Surgeon(s):  May Doyle MD        Anesthesia: General    Estimated Blood Loss (mL): Minimal    Complications: None    Specimens:   ID Type Source Tests Collected by Time Destination   A : L3 vertebral bodies Tissue Back SURGICAL PATHOLOGY May Doyle MD 11/2/2023 1037        Implants:  Implant Name Type Inv.  Item Serial No.  Lot No. LRB No. Used Action   CEMENT BNE HI VISC RADIOPA[employer] KYPHON HV-R - [id number]  CEMENT BNE HI [facility] HV-R  [employer] Cecilio Mabry [id number] Left 1 Implanted               Electronically signed by Fariha Miller MD on 11/2/2023 at 10:46 AM

## 2023-11-02 NOTE — PROGRESS NOTES
[facility]  OCCUPATIONAL THERAPY MISSED TREATMENT NOTE  STRZ OR  STRZ OR (General) POOL R*      Date: 2023  Patient Name: Federico Wright        CSN: [id number]   : 3/1/1934  (80 y.o.)  Gender: female   Referring Practitioner: Amanda Huffman MD  Diagnosis: intractable back pain         REASON FOR MISSED TREATMENT: Pt. Out for procedure. Will re-attempt later this date as able and as Pt. Medically appropriate.

## 2023-11-03 VITALS
WEIGHT: 160 LBS | SYSTOLIC BLOOD PRESSURE: 136 MMHG | OXYGEN SATURATION: 93 % | BODY MASS INDEX: 25.71 KG/M2 | HEIGHT: 66 IN | TEMPERATURE: 98.2 F | HEART RATE: 84 BPM | DIASTOLIC BLOOD PRESSURE: 60 MMHG | RESPIRATION RATE: 19 BRPM

## 2023-11-03 LAB
HEPARIN UNFRACTIONATED: 0.83 U/ML (ref 0.3–0.7)
HEPARIN UNFRACTIONATED: 1.19 U/ML (ref 0.3–0.7)
INR PPP: 1.19 (ref 0.85–1.13)

## 2023-11-03 PROCEDURE — 88364 INSITU HYBRIDIZATION (FISH): CPT

## 2023-11-03 PROCEDURE — 88342 IMHCHEM/IMCYTCHM 1ST ANTB: CPT

## 2023-11-03 PROCEDURE — 36415 COLL VENOUS BLD VENIPUNCTURE: CPT

## 2023-11-03 PROCEDURE — 85520 HEPARIN ASSAY: CPT

## 2023-11-03 PROCEDURE — 88365 INSITU HYBRIDIZATION (FISH): CPT

## 2023-11-03 PROCEDURE — 88360 TUMOR IMMUNOHISTOCHEM/MANUAL: CPT

## 2023-11-03 PROCEDURE — 97530 THERAPEUTIC ACTIVITIES: CPT

## 2023-11-03 PROCEDURE — 88305 TISSUE EXAM BY PATHOLOGIST: CPT

## 2023-11-03 PROCEDURE — 97116 GAIT TRAINING THERAPY: CPT

## 2023-11-03 PROCEDURE — 6370000000 HC RX 637 (ALT 250 FOR IP): Performed by: PHYSICIAN ASSISTANT

## 2023-11-03 PROCEDURE — 6370000000 HC RX 637 (ALT 250 FOR IP): Performed by: HOSPITALIST

## 2023-11-03 PROCEDURE — 96366 THER/PROPH/DIAG IV INF ADDON: CPT

## 2023-11-03 PROCEDURE — 99239 HOSP IP/OBS DSCHRG MGMT >30: CPT | Performed by: NURSE PRACTITIONER

## 2023-11-03 PROCEDURE — 97535 SELF CARE MNGMENT TRAINING: CPT

## 2023-11-03 PROCEDURE — 6370000000 HC RX 637 (ALT 250 FOR IP): Performed by: NURSE PRACTITIONER

## 2023-11-03 PROCEDURE — 97110 THERAPEUTIC EXERCISES: CPT

## 2023-11-03 PROCEDURE — 2580000003 HC RX 258: Performed by: HOSPITALIST

## 2023-11-03 PROCEDURE — 88341 IMHCHEM/IMCYTCHM EA ADD ANTB: CPT

## 2023-11-03 PROCEDURE — 6360000002 HC RX W HCPCS: Performed by: NURSE PRACTITIONER

## 2023-11-03 PROCEDURE — 85610 PROTHROMBIN TIME: CPT

## 2023-11-03 PROCEDURE — 96372 THER/PROPH/DIAG INJ SC/IM: CPT

## 2023-11-03 RX ORDER — PSEUDOEPHEDRINE HCL 30 MG
100 TABLET ORAL 2 TIMES DAILY
Qty: 60 CAPSULE | Refills: 0 | Status: SHIPPED | OUTPATIENT
Start: 2023-11-03

## 2023-11-03 RX ORDER — ENOXAPARIN SODIUM 100 MG/ML
1 INJECTION SUBCUTANEOUS 2 TIMES DAILY
Status: DISCONTINUED | OUTPATIENT
Start: 2023-11-03 | End: 2023-11-03 | Stop reason: HOSPADM

## 2023-11-03 RX ORDER — WARFARIN SODIUM 3 MG/1
6 TABLET ORAL
Status: COMPLETED | OUTPATIENT
Start: 2023-11-03 | End: 2023-11-03

## 2023-11-03 RX ORDER — ENOXAPARIN SODIUM 100 MG/ML
1 INJECTION SUBCUTANEOUS 2 TIMES DAILY
Qty: 14 EACH | Refills: 0 | Status: SHIPPED | OUTPATIENT
Start: 2023-11-03 | End: 2023-11-14

## 2023-11-03 RX ADMIN — WARFARIN SODIUM 6 MG: 3 TABLET ORAL at 11:35

## 2023-11-03 RX ADMIN — DILTIAZEM HYDROCHLORIDE 360 MG: 180 CAPSULE, COATED, EXTENDED RELEASE ORAL at 09:39

## 2023-11-03 RX ADMIN — DOCUSATE SODIUM 100 MG: 100 CAPSULE, LIQUID FILLED ORAL at 09:41

## 2023-11-03 RX ADMIN — ENOXAPARIN SODIUM 70 MG: 100 INJECTION SUBCUTANEOUS at 13:04

## 2023-11-03 RX ADMIN — POLYETHYLENE GLYCOL 3350 17 G: 17 POWDER, FOR SOLUTION ORAL at 09:42

## 2023-11-03 RX ADMIN — SODIUM CHLORIDE, PRESERVATIVE FREE 10 ML: 5 INJECTION INTRAVENOUS at 08:30

## 2023-11-03 RX ADMIN — HEPARIN SODIUM 14 UNITS/KG/HR: 10000 INJECTION, SOLUTION INTRAVENOUS at 00:48

## 2023-11-03 RX ADMIN — LEVOTHYROXINE SODIUM 125 MCG: 0.12 TABLET ORAL at 05:09

## 2023-11-03 ASSESSMENT — PAIN SCALES - GENERAL
PAINLEVEL_OUTOF10: 0
PAINLEVEL_OUTOF10: 0

## 2023-11-03 NOTE — PLAN OF CARE
Problem: Discharge Planning  Goal: Discharge to home or other facility with appropriate resources  11/2/2023 2205 by Linn Stinson RN  Outcome: Progressing  Flowsheets (Taken 11/2/2023 1057 by Jos Brock, RN)  Discharge to home or other facility with appropriate resources:   Identify barriers to discharge with patient and caregiver   Identify discharge learning needs (meds, wound care, etc)   Refer to discharge planning if patient needs post-hospital services based on physician order or complex needs related to functional status, cognitive ability or social support system   Arrange for needed discharge resources and transportation as appropriate     Problem: Safety - Adult  Goal: Free from fall injury  11/2/2023 2205 by Linn Stinson RN  Outcome: Progressing  [address] (Taken 11/2/2023 2159)  Free From Fall Injury: Instruct family/caregiver on patient safety     Problem: ABCDS Injury Assessment  Goal: Absence of physical injury  11/2/2023 2205 by Linn Stinson RN  Outcome: Progressing  Flowsheets (Taken 11/2/2023 2159)  Absence of Physical Injury: Implement safety measures based on patient assessment     Problem: Skin/Tissue Integrity  Goal: Absence of new skin breakdown  Description: 1. Monitor for areas of redness and/or skin breakdown  2. Assess vascular access sites hourly  3. Every 4-6 hours minimum:  Change oxygen saturation probe site  4. Every 4-6 hours:  If on nasal continuous positive airway pressure, respiratory therapy assess nares and determine need for appliance change or resting period. 11/2/2023 2205 by Linn Stinson RN  Outcome: Progressing  Note: No evidence of new skin breakdown. Pt. Fernando Innocent and repositions self.      Problem: Pain  Goal: Verbalizes/displays adequate comfort level or baseline comfort level  11/2/2023 2205 by Linn Stinson RN  Outcome: Progressing  Flowsheets (Taken 11/2/2023 1057 by Jos Brock, RN)  Verbalizes/displays adequate comfort level or baseline comfort level:   Encourage patient to monitor pain and request assistance   Administer analgesics based on type and severity of pain and evaluate response   Consider cultural and social influences on pain and pain management   Assess pain using appropriate pain scale   Implement non-pharmacological measures as appropriate and evaluate response   Notify Licensed Independent Practitioner if interventions unsuccessful or patient reports new pain     Problem: Musculoskeletal - Adult  Goal: Return mobility to safest level of function  11/2/2023 2205 by Jeane Thompson RN  Outcome: Progressing  Flowsheets (Taken 11/2/2023 1057 by Solo Kohler RN)  Return Mobility to Safest Level of Function:   Assess patient stability and activity tolerance for standing, transferring and ambulating with or without assistive devices   Assist with transfers and ambulation using safe patient handling equipment as needed   Ensure adequate protection for wounds/incisions during mobilization   Obtain physical therapy/occupational therapy consults as needed   Instruct patient/family in ordered activity level     Problem: Gastrointestinal - Adult  Goal: Maintains or returns to baseline bowel function  11/2/2023 2205 by Jeane Thompson RN  Outcome: Progressing  Flowsheets (Taken 11/2/2023 1057 by Solo Kohler RN)  Maintains or returns to baseline bowel function:   Assess bowel function   Administer IV fluids as ordered to ensure adequate hydration   Encourage mobilization and activity   Encourage oral fluids to ensure adequate hydration   Administer ordered medications as needed   Care plan reviewed with patient. Patient verbalize understanding of the plan of care and contribute to goal setting.

## 2023-11-03 NOTE — PROGRESS NOTES
AVS reviewed with patient. All questions answered. Patient verbalized an understanding. Patient signed signature page and it was placed on the chart.

## 2023-11-03 NOTE — DISCHARGE INSTR - MEDS
Clinical Pharmacy Note                                               Warfarin Discharge Recommendations    Hospital Warfarin Doses & INR Results  Date INR Warfarin Dose   11/2/2023 1.17 6 mg    11/3/2023  1.19  6 mg         Warfarin Discharge Instructions:   Date Warfarin Dose Lovenox Dose    11/4/2023 (tomorrow) 4 mg (2 tablets) Lovenox 80mg AM + PM   11/5/2023 4 mg (2 tablets) Lovenox 80mg AM + PM   11/6/2023 4 mg (2 tablets) Loveox 80mg AM + PM   11/7/2023  INR Check Lovenox 80mg AM     Provider dosing warfarin: [facility]   Next INR date: 11/7/2023 0840 AM

## 2023-11-03 NOTE — CARE COORDINATION
11/3/23, 10:10 AM EDT    Patient goals/plan/ treatment preferences discussed by  and . Patient goals/plan/ treatment preferences reviewed with patient/ family. Patient/ family verbalize understanding of discharge plan and are in agreement with goal/plan/treatment preferences. Understanding was demonstrated using the teach back method. AVS provided by RN at time of discharge, which includes all necessary medical information pertaining to the patients current course of illness, treatment, post-discharge goals of care, and treatment preferences. Services At/After Discharge: None       IMM Letter  IMM Letter given to Patient/Family/Significant other/Guardian/POA/by[de-identified] Amado Trejo RN CM  IMM Letter date given[de-identified] 11/03/23  IMM Letter time given[de-identified] 3418  Observation Status Letter date given[de-identified] 10/26/23  Observation Status Letter time given[de-identified] 7927  Observation Status Letter given to Patient/Family/Significant other/Guardian/POA/by[de-identified] patient access     Emma is returning home with strong and frequent family support. She denies need for [facility] or new [facility].

## 2023-11-03 NOTE — DISCHARGE INSTRUCTIONS
Tylenol as needed    Please do not over exert yourself    Dressing may be removed 11/4. Leave steri strips in place and allow them to fall off on their own.

## 2023-11-03 NOTE — PROGRESS NOTES
Clinical Pharmacy Note                                               Warfarin Discharge Recommendations      Patient discharged from [facility] today on warfarin.     Warfarin indication: Hx of PE  INR goal during admission: 2.0-3.0  Previous home warfarin regimen: 4mg daily  Interacting medications at discharge: Lovenox,  levthyroxine (HM)  Coumadin 2 mg tabs    Hospital Warfarin Doses & INR Results  Date INR Warfarin Dose   11/2/2023 1.17 6 mg    11/3/2023  1.19  6 mg       Recent INRs:  Recent Labs     11/03/23  0603   INR 1.19*     Warfarin Discharge Instructions:   Date Warfarin Dose Lovenox Dose    11/4/2023 (tomorrow) 4 mg (2 tablets) Lovenox 80mg AM + PM   11/5/2023 4 mg (2 tablets) Lovenox 80mg AM + PM   11/6/2023 4 mg (2 tablets) Loveox 80mg AM + PM   11/7/2023  INR Check Lovenox 80mg AM     Provider dosing warfarin: [facility]   Next INR date: 11/7/2023 0840 AM    Swati Tabares PharmD 11/3/2023 11:08 AM

## 2023-11-03 NOTE — PLAN OF CARE
[facility] is checking to see if the office has received the authorization for the patient. Pauline stated it will be the third time she will be faxing it over since June and would like for the patient to be scheduled.    Problem: Discharge Planning  Goal: Discharge to home or other facility with appropriate resources  11/2/2023 2159 by Brissa Bradshaw RN  Outcome: Progressing  Flowsheets (Taken 11/2/2023 1057 by Sabino Cornelius RN)  Discharge to home or other facility with appropriate resources:   Identify barriers to discharge with patient and caregiver   Identify discharge learning needs (meds, wound care, etc)   Refer to discharge planning if patient needs post-hospital services based on physician order or complex needs related to functional status, cognitive ability or social support system   Arrange for needed discharge resources and transportation as appropriate     Problem: Safety - Adult  Goal: Free from fall injury  11/2/2023 2159 by Brissa Bradshaw RN  Outcome: Progressing  Flowsheets (Taken 11/2/2023 2159)  Free From Fall Injury: Instruct family/caregiver on patient safety     Problem: ABCDS Injury Assessment  Goal: Absence of physical injury  11/2/2023 2159 by Brissa Bradshaw RN  Outcome: Progressing  Flowsheets (Taken 11/2/2023 2159)  Absence of Physical Injury: Implement safety measures based on patient assessment     Problem: Skin/Tissue Integrity  Goal: Absence of new skin breakdown  Description: 1. Monitor for areas of redness and/or skin breakdown  2. Assess vascular access sites hourly  3. Every 4-6 hours minimum:  Change oxygen saturation probe site  4. Every 4-6 hours:  If on nasal continuous positive airway pressure, respiratory therapy assess nares and determine need for appliance change or resting period. 11/2/2023 2159 by Brissa Bradshaw RN  Outcome: Progressing  Note: No evidence of new skin breakdown. Pt. Minaya Bolus and repositions self.      Problem: Pain  Goal: Verbalizes/displays adequate comfort level or baseline comfort level  11/2/2023 2159 by Brissa Bradshaw RN  Outcome: Progressing  Flowsheets (Taken 11/2/2023 1057 by Sabino Cornelius RN)  Verbalizes/displays adequate comfort level or baseline comfort level:   Encourage patient to monitor pain and request assistance   Administer analgesics based on type and severity of pain and evaluate response   Consider cultural and social influences on pain and pain management   Assess pain using appropriate pain scale   Implement non-pharmacological measures as appropriate and evaluate response   Notify Licensed Independent Practitioner if interventions unsuccessful or patient reports new pain     Problem: Musculoskeletal - Adult  Goal: Return mobility to safest level of function  11/2/2023 2159 by Prem Liz RN  Outcome: Progressing  Flowsheets (Taken 11/2/2023 1057 by Ethan Auguste RN)  Return Mobility to Safest Level of Function:   Assess patient stability and activity tolerance for standing, transferring and ambulating with or without assistive devices   Assist with transfers and ambulation using safe patient handling equipment as needed   Ensure adequate protection for wounds/incisions during mobilization   Obtain physical therapy/occupational therapy consults as needed   Instruct patient/family in ordered activity level     Problem: Gastrointestinal - Adult  Goal: Maintains or returns to baseline bowel function  11/2/2023 2159 by Prem Liz RN  Outcome: Progressing  Flowsheets (Taken 11/2/2023 1057 by Ethan Auguste RN)  Maintains or returns to baseline bowel function:   Assess bowel function   Administer IV fluids as ordered to ensure adequate hydration   Encourage mobilization and activity   Encourage oral fluids to ensure adequate hydration   Administer ordered medications as needed   Care plan reviewed with patient. Patient verbalize understanding of the plan of care and contribute to goal setting.

## 2023-11-03 NOTE — DISCHARGE SUMMARY
Hospital Medicine Discharge Summary      Patient Identification:   Maury Ponce   : 3/1/1934  MRN: [id number]   Account: [de-identified]      Patient's PCP: LISA Alexander CNP    Admit Date: 10/26/2023     Discharge Date:   11/3/2023    Admitting Physician: Demarcus Whaley MD     Discharging Nurse Practitioner: LISA Garcia CNP     Discharge Diagnoses with Assessment/Plan:  Acute nontraumatic L3 compression fracture, possibly pathologic, possibly secondary to osteoporosis S/P Lumbar 3 Kyphoplasty and Left Lumbar 4 Epidural Steroid Injection on 2023--appreciate pain management input; pain has greatly improved, ambulating better, Tylenol as needed  Primary hypertension, uncontrolled--on Cardizem; stable  History of PE in --okay to resume Coumadin per pain management on  ~pharmacy dosing Coumadin; will transition off heparin drip and start on Lovenox via therapeutic dose to obtain a therapeutic INR 2.0-3.0; patient states she has done these injections before and feels comfortable  Hypothyroidism--treated  CODE STATUS--limited x4     The patient was seen and examined on day of discharge and this discharge summary is in conjunction with any daily progress note from day of discharge. Hospital Course:   Maury Ponce is a 80 y.o. female admitted to [facility] on 10/26/2023 for back pain;   Per H&P done 10/26/2023: \"The patient is a 80 y.o. female who presents with complaints of lower back pain that radiates to left hip, associated with numbness of her left leg and inability to ambulate. Patient is accompanied by her daughter. She reports a long standing history of back pain but is usually able to treat this at home with Ice/heat and tylenol. Patient denies any trauma, fall or injury. She states the pain was sudden in onset and very severe that she required EMS to bring her in.   She denies any loss of yovani or bladder control but admits to recent constipation EVAL  IP CONSULT TO ORTHOPEDIC SURGERY  IP CONSULT TO PAIN MANAGEMENT  IP CONSULT TO PHARMACY  PHARMACY TO DOSE WARFARIN    Disposition:    [x] Home       [] TCU       [] Rehab       [] Psych       [] SNF       [] [address]       [] Other-    Condition at Discharge: Stable    Code Status:  Limited     Pending tests at discharge:    none     Patient Instructions:    Discharge lab work: none  Activity: activity as tolerated  Diet: ADULT DIET; Regular      Follow-up visits:   Linward Siemens, MD  [address]    Follow up  as directed    LISA Maradiaga - CNP  [address]  [phone]    Follow up in 1 week(s)           Discharge Medications:        Medication List        START taking these medications      docusate 100 MG Caps  Commonly known as: COLACE, DULCOLAX  Take 100 mg by mouth 2 times daily     enoxaparin 80 MG/0.8ML  Commonly known as: LOVENOX  Inject 0.7 mLs into the skin 2 times daily            CONTINUE taking these medications      acetaminophen 500 MG tablet  Commonly known as: TYLENOL     atorvastatin 20 MG tablet  Commonly known as: LIPITOR  Take 1 tablet by mouth every evening Indications: Blood Cholesterol Abnormal     betamethasone valerate 0.1 % cream  Commonly known as: VALISONE     D-MANNOSE PO     dilTIAZem 180 MG extended release capsule  Commonly known as: DILACOR XR  Take 2 capsules by mouth daily Indications: High Blood Pressure Disorder     Handicap Placard Misc  by Does not apply route Duration: 3 years  Physical Deconditioning     levothyroxine 125 MCG tablet  Commonly known as: SYNTHROID  Take 1 tablet by mouth Daily Indications: Impaired Thyroid Function Pt wants only synthroid brand name  Takes [facility] only. 5-14-18. SF 5000 Plus 1.1 % Crea  Generic drug: SODIUM FLUORIDE (DENTAL GEL)     SYSTANE ULTRA OP     warfarin 2 MG tablet  Commonly known as: Jantoven  Take as directed.  If you are unsure how

## 2023-11-03 NOTE — PROGRESS NOTES
[address] [facility]  Occupational Therapy  Daily Note  Time:   Time In: 5449  Time Out: 4385  Timed Code Treatment Minutes: 40 Minutes  Minutes: 44          Date: 11/3/2023  Patient Name: Federico Wright,   Gender: female      Room: [facility]-A  MRN: [id number]  : 3/1/1934  (80 y.o.)  Referring Practitioner: Amanda Huffman MD  Diagnosis: intractable back pain  Additional Pertinent Hx: The patient is a 80 y.o. female who presents with complaints of lower back pain that radiates to left hip, associated with numbness of her left leg and inability to ambulate. Patient is accompanied by her daughter. She reports a long standing history of back pain but is usually able to treat this at home with Ice/heat and tylenol. Patient denies any trauma, fall or injury. She states the pain was sudden in onset and very severe that she required EMS to bring her in. She denies any loss of yovani or bladder control but admits to recent constipation and needing to use suppositories over the last few days. MRI of L spine shows acute fracture of the inferior endplate of L3. The height loss is 10%. This is most likely pathologic secondary to underlying osteoporosis. Restrictions/Precautions:  Restrictions/Precautions: General Precautions, Up as Tolerated  Position Activity Restriction  Spinal Precautions: No Bending, No Lifting, No Twisting  Other position/activity restrictions: Kyphoplasty done on 2023     SUBJECTIVE: RN okayed OT session. Pt. In room and agreeable to Kyphoplasty performed on 2023. PAIN: 1/10: low back    Vitals: Nurse checked vitals prior to session    COGNITION: WFL    ADL:   Grooming: Stand By Assistance. To stand at sink and complete  Upper Extremity Dressing: Stand By Assistance. To don robe  Lower Extremity Dressing:  Trialed figure four method Pt. Unable to safely complete. Footwear Management: Maximum Assistance.   To don slip on shoes demo increased success and min A with long handled shoe horn. Toileting: Stand By Assistance. Toilet Transfer: Stand By Assistance. Naeem Lantigua BALANCE:  Sitting Balance:  Modified Independent. Seated on EOB  Standing Balance: Stand By Assistance. No LOB stood X 4 mins at sink. BED MOBILITY:  Supine to Sit: Stand By Assistance      TRANSFERS:  Sit to Stand:  Stand By Assistance. Stand to Sit: Stand By Assistance. FUNCTIONAL MOBILITY:  Assistive Device: Rolling Walker  Assist Level:  Stand By Assistance. Distance: [facility] and [facility] distance in [facility]. ADDITIONAL ACTIVITIES:  Pt. Instructed on precautions and benefits on LHAE available. Demo use of sock aid and LHR to don clothing and to assist with in home daily tasks. Pt. Demo need for assistance for LB dressing although at this time reports will have family come in and assist declines AE needs. Pt. Provided with purchasing options if decides to purchase upon returning home. AM-[facility] Inpatient Daily Activity Raw Score: 22  AM-P[facility] Inpatient ADL T-Scale Score : 47.1  ADL Inpatient CMS 0-100% Score: 25.8    Modified [place] Scale:  +2 - Slight disability; unable to carry out all previous activities, but able to look after own affairs without assistance    ASSESSMENT:     Activity Tolerance:  Patient tolerance of  treatment: Good treatment tolerance      Discharge Recommendations: Home with assist as needed and Home with Home Health OT  Equipment Recommendations:  Other: monitor pending progress  Plan: Times Per Week: 6x  Current Treatment Recommendations: Functional mobility training, Balance training, Self-Care / ADL, Safety education & training, Endurance training    Patient Education  Patient Education: ADL's, IADL's, Precautions, Equipment Education, Reviewed Prior Education, Home Safety, Importance of Increasing Activity, and Assistive Device Safety    Goals  Short Term Goals  Time Frame for Short Term Goals: until discharge  Short Term Goal 1: Pt will

## 2023-11-03 NOTE — PROGRESS NOTES
[address]   INPATIENT PHYSICAL THERAPY  [facility] OR[facility] 7K - 7K-04/004-A  Daily Note    Time In: 4617  Time Out: 1105  Timed Code Treatment Minutes: 40 Minutes  Minutes: 40          Date: 11/3/2023  Patient Name: Amy Mas,  Gender:  female        MRN: [id number]  : 3/1/1934  (80 y.o.)     Referring Practitioner: Rosalia Martinez MD  Diagnosis: Intractable back pain  Additional Pertinent Hx: Per EMR:The patient is a 80 y.o. female who presents with complaints of lower back pain that radiates to left hip, associated with numbness of her left leg and inability to ambulate. Patient is accompanied by her daughter. She reports a long standing history of back pain but is usually able to treat this at home with Ice/heat and tylenol. Patient denies any trauma, fall or injury. She states the pain was sudden in onset and very severe that she required EMS to bring her in. She denies any loss of yovani or bladder control but admits to recent constipation and needing to use suppositories over the last few days. MRI of L spine shows acute fracture of the inferior endplate of L3. The height loss is 10%. This is most likely pathologic secondary to underlying osteoporosis. Prior Level of Function:  Lives With: Alone  Type of Home: Apartment  Home Layout: One level  Home Access: Level entry (threshold)  Home Equipment: Walker, rolling (walking stick to get mail)   Bathroom Shower/Tub: Tub/Shower unit (sponge bathe x 1 week)  Bathroom Toilet: Handicap height  Bathroom Equipment: Commode    Receives Help From: Family  ADL Assistance: Independent  Homemaking Assistance: Independent  Ambulation Assistance: Independent  Transfer Assistance: Independent  Active : Yes  Additional Comments: Patient reports she was active and independent PTA without use of an AD. Usually patient is able to complete all ADLs but recently has had help from dtr and son for bathing due to back pain.  Patient reports she has

## 2023-11-06 ENCOUNTER — APPOINTMENT (OUTPATIENT)
Dept: INTERVENTIONAL RADIOLOGY/VASCULAR | Age: 88
End: 2023-11-06
Payer: MEDICARE

## 2023-11-06 ENCOUNTER — HOSPITAL ENCOUNTER (EMERGENCY)
Age: 88
Discharge: HOME | End: 2023-11-06
Attending: EMERGENCY MEDICINE
Payer: MEDICARE

## 2023-11-06 VITALS
RESPIRATION RATE: 17 BRPM | TEMPERATURE: 98.2 F | HEIGHT: 66 IN | SYSTOLIC BLOOD PRESSURE: 132 MMHG | DIASTOLIC BLOOD PRESSURE: 62 MMHG | HEART RATE: 54 BPM | OXYGEN SATURATION: 91 % | BODY MASS INDEX: 25.01 KG/M2 | WEIGHT: 155.65 LBS

## 2023-11-06 DIAGNOSIS — Z79.01 ANTICOAGULATED ON COUMADIN: ICD-10-CM

## 2023-11-06 DIAGNOSIS — M79.661 RIGHT CALF PAIN: Primary | ICD-10-CM

## 2023-11-06 LAB
ANION GAP SERPL CALC-SCNC: 11 MEQ/L (ref 8–16)
BASOPHILS ABSOLUTE: 0 THOU/MM3 (ref 0–0.1)
BASOPHILS NFR BLD AUTO: 0.3 %
BUN SERPL-MCNC: 11 MG/DL (ref 7–22)
CALCIUM SERPL-MCNC: 9.2 MG/DL (ref 8.5–10.5)
CHLORIDE SERPL-SCNC: 98 MEQ/L (ref 98–111)
CO2 SERPL-SCNC: 30 MEQ/L (ref 23–33)
CREAT SERPL-MCNC: 0.6 MG/DL (ref 0.4–1.2)
DEPRECATED RDW RBC AUTO: 43.7 FL (ref 35–45)
EOSINOPHIL NFR BLD AUTO: 0.8 %
EOSINOPHILS ABSOLUTE: 0.1 THOU/MM3 (ref 0–0.4)
ERYTHROCYTE [DISTWIDTH] IN BLOOD BY AUTOMATED COUNT: 13.2 % (ref 11.5–14.5)
GFR SERPL CREATININE-BSD FRML MDRD: > 60 ML/MIN/1.73M2
GLUCOSE SERPL-MCNC: 98 MG/DL (ref 70–108)
HCT VFR BLD AUTO: 44.8 % (ref 37–47)
HGB BLD-MCNC: 14.6 GM/DL (ref 12–16)
IMM GRANULOCYTES # BLD AUTO: 0.04 THOU/MM3 (ref 0–0.07)
IMM GRANULOCYTES NFR BLD AUTO: 0.4 %
INR PPP: 2.26 (ref 0.85–1.13)
LYMPHOCYTES ABSOLUTE: 5 THOU/MM3 (ref 1–4.8)
LYMPHOCYTES NFR BLD AUTO: 43.6 %
MCH RBC QN AUTO: 29.7 PG (ref 26–33)
MCHC RBC AUTO-ENTMCNC: 32.6 GM/DL (ref 32.2–35.5)
MCV RBC AUTO: 91.2 FL (ref 81–99)
MONOCYTES ABSOLUTE: 0.7 THOU/MM3 (ref 0.4–1.3)
MONOCYTES NFR BLD AUTO: 6.3 %
NEUTROPHILS NFR BLD AUTO: 48.6 %
NRBC BLD AUTO-RTO: 0 /100 WBC
OSMOLALITY SERPL CALC.SUM OF ELEC: 276.9 MOSMOL/KG (ref 275–300)
PLATELET # BLD AUTO: 212 THOU/MM3 (ref 130–400)
PMV BLD AUTO: 11.4 FL (ref 9.4–12.4)
POTASSIUM SERPL-SCNC: 3.5 MEQ/L (ref 3.5–5.2)
RBC # BLD AUTO: 4.91 MILL/MM3 (ref 4.2–5.4)
SEGMENTED NEUTROPHILS ABSOLUTE COUNT: 5.5 THOU/MM3 (ref 1.8–7.7)
SODIUM SERPL-SCNC: 139 MEQ/L (ref 135–145)
WBC # BLD AUTO: 11.4 THOU/MM3 (ref 4.8–10.8)

## 2023-11-06 PROCEDURE — 80048 BASIC METABOLIC PNL TOTAL CA: CPT

## 2023-11-06 PROCEDURE — 6360000002 HC RX W HCPCS: Performed by: STUDENT IN AN ORGANIZED HEALTH CARE EDUCATION/TRAINING PROGRAM

## 2023-11-06 PROCEDURE — 96375 TX/PRO/DX INJ NEW DRUG ADDON: CPT

## 2023-11-06 PROCEDURE — 93971 EXTREMITY STUDY: CPT

## 2023-11-06 PROCEDURE — 36415 COLL VENOUS BLD VENIPUNCTURE: CPT

## 2023-11-06 PROCEDURE — 85610 PROTHROMBIN TIME: CPT

## 2023-11-06 PROCEDURE — 96374 THER/PROPH/DIAG INJ IV PUSH: CPT

## 2023-11-06 PROCEDURE — 85025 COMPLETE CBC W/AUTO DIFF WBC: CPT

## 2023-11-06 PROCEDURE — 99284 EMERGENCY DEPT VISIT MOD MDM: CPT

## 2023-11-06 RX ORDER — ONDANSETRON 2 MG/ML
4 INJECTION INTRAMUSCULAR; INTRAVENOUS ONCE
Status: COMPLETED | OUTPATIENT
Start: 2023-11-06 | End: 2023-11-06

## 2023-11-06 RX ORDER — MORPHINE SULFATE 2 MG/ML
2 INJECTION, SOLUTION INTRAMUSCULAR; INTRAVENOUS ONCE
Status: COMPLETED | OUTPATIENT
Start: 2023-11-06 | End: 2023-11-06

## 2023-11-06 RX ADMIN — ONDANSETRON 4 MG: 2 INJECTION INTRAMUSCULAR; INTRAVENOUS at 18:58

## 2023-11-06 RX ADMIN — MORPHINE SULFATE 2 MG: 2 INJECTION, SOLUTION INTRAMUSCULAR; INTRAVENOUS at 18:59

## 2023-11-06 ASSESSMENT — PAIN SCALES - GENERAL
PAINLEVEL_OUTOF10: 4
PAINLEVEL_OUTOF10: 8

## 2023-11-06 ASSESSMENT — PAIN DESCRIPTION - ORIENTATION: ORIENTATION: RIGHT

## 2023-11-06 ASSESSMENT — PAIN - FUNCTIONAL ASSESSMENT
PAIN_FUNCTIONAL_ASSESSMENT: 0-10
PAIN_FUNCTIONAL_ASSESSMENT: 0-10

## 2023-11-06 ASSESSMENT — PAIN DESCRIPTION - LOCATION: LOCATION: LEG

## 2023-11-07 NOTE — ED TRIAGE NOTES
Pt presents to the ED from home with complaints of right leg pain that started on Saturday. Pt states she had \"left) spine surgery on 11/2. Pt states she had stopped her blood thinner for about 5 days for surgery and is now taking lovenox. Pt complains of right leg pain an 8/10.

## 2023-11-07 NOTE — DISCHARGE INSTRUCTIONS
Take your medications as prescribed. Follow-up with Dr. Philip Huynh. Return to the emergency department if you develop uncontrollable pain, chest pain, shortness of breath, passing out or if you have any other concerns.

## 2023-11-07 NOTE — ED NOTES
Pt resting in bed. Respirations even and unlabored. External catheter placed. Pt states pain has improved, now 4/10. No other needs expressed at this time. Bed lowest position, call light in reach, side rails x2.       Kami Flores RN  11/06/23 2009

## 2023-11-07 NOTE — ED NOTES
Pt resting in bed. Respirations even and unlabored. IV established. Lab work collected and sent to lab. Pt medicated per MAR. Pt taken to vascular ultrasound.       Chance Mario RN  11/06/23 0412

## 2023-11-08 ENCOUNTER — OFFICE VISIT (OUTPATIENT)
Dept: PHYSICAL MEDICINE AND REHAB | Age: 88
End: 2023-11-08
Payer: MEDICARE

## 2023-11-08 VITALS
BODY MASS INDEX: 25.01 KG/M2 | DIASTOLIC BLOOD PRESSURE: 80 MMHG | HEIGHT: 66 IN | WEIGHT: 155.65 LBS | SYSTOLIC BLOOD PRESSURE: 120 MMHG

## 2023-11-08 DIAGNOSIS — M51.36 LUMBAR DEGENERATIVE DISC DISEASE: ICD-10-CM

## 2023-11-08 DIAGNOSIS — M53.3 SI (SACROILIAC) PAIN: ICD-10-CM

## 2023-11-08 DIAGNOSIS — M48.061 SPINAL STENOSIS OF LUMBAR REGION WITHOUT NEUROGENIC CLAUDICATION: Primary | ICD-10-CM

## 2023-11-08 DIAGNOSIS — S32.030S CLOSED COMPRESSION FRACTURE OF L3 VERTEBRA, SEQUELA: ICD-10-CM

## 2023-11-08 DIAGNOSIS — M54.16 LUMBAR RADICULITIS: ICD-10-CM

## 2023-11-08 DIAGNOSIS — M47.816 LUMBAR FACET ARTHROPATHY: ICD-10-CM

## 2023-11-08 DIAGNOSIS — Z98.890 S/P KYPHOPLASTY: ICD-10-CM

## 2023-11-08 PROCEDURE — 99214 OFFICE O/P EST MOD 30 MIN: CPT | Performed by: NURSE PRACTITIONER

## 2023-11-08 PROCEDURE — 1123F ACP DISCUSS/DSCN MKR DOCD: CPT | Performed by: NURSE PRACTITIONER

## 2023-11-08 RX ORDER — LIDOCAINE 50 MG/G
OINTMENT TOPICAL EVERY 4 HOURS PRN
Qty: 50 G | Refills: 2 | Status: SHIPPED | OUTPATIENT
Start: 2023-11-08

## 2023-11-08 ASSESSMENT — ENCOUNTER SYMPTOMS
BACK PAIN: 1
GASTROINTESTINAL NEGATIVE: 1
EYES NEGATIVE: 1
ALLERGIC/IMMUNOLOGIC NEGATIVE: 1
RESPIRATORY NEGATIVE: 1

## 2023-11-08 NOTE — PROGRESS NOTES
[address] [place] [address]  Dept: [phone]  Dept Fax: [phone]: [phone]    Visit Date: 11/8/2023    Functionality Assessment/Goals Worksheet     On a scale of 0 (Does not Interfere) to 10 (Completely Interferes)     1. Which number describes how during the past week pain has interfered with       the following:  A. General Activity:  10  B. Mood: 10  C. Walking Ability:  10  D. Normal Work (Includes both work outside the home and housework):  10  E. Relations with Other People:   0  F. Sleep:   5  G. Enjoyment of Life:   10    2. Patient Prefers to Take their Pain Medications:     []  On a regular basis   [x]  Only when necessary    []  Does not take pain medications    3. What are the Patient's Goals/Expectations for Visiting Pain Management? []  Learn about my pain    [x]  Receive Medication   []  Physical Therapy     []  Treat Depression   [x]  Receive Injections    []  Treat Sleep   []  Deal with Anxiety and Stress   []  Treat Opoid Dependence/Addiction   []  Other:      HPI:   Lory Essex Strange is a 80 y.o. female is here today for    Chief Complaint: right leg pain, low back pain     Daughter present     HPI   FU from hospital admission and Kyphoplasty at L3 and left LESI completed on 11/2/2023. Patient is recioeveing 100% relief of low back pain from the Kyphoplasty \"I have no pain at all in my low back left side like I did before\". Also comple relief of left leg pain as well. She was discharged from the hospital on 11/3/2023. Since discharge on Saturday woke up with a new complaint ofpain in her right lower extremity and she went to the ER on Monday 11/6/1012 for this right calf pain. She had a venous doppler.    States pain continues in right lower extremity was in calf when she was ED but she states it has moved around and has been intermittent anywhere

## 2023-11-14 ENCOUNTER — HOSPITAL ENCOUNTER (OUTPATIENT)
Dept: PHARMACY | Age: 88
Setting detail: THERAPIES SERIES
Discharge: HOME | End: 2023-11-14
Payer: MEDICARE

## 2023-11-14 DIAGNOSIS — Z79.01 ANTICOAGULATED ON COUMADIN: ICD-10-CM

## 2023-11-14 DIAGNOSIS — I26.99 PULMONARY EMBOLISM, UNSPECIFIED CHRONICITY, UNSPECIFIED PULMONARY EMBOLISM TYPE, UNSPECIFIED WHETHER ACUTE COR PULMONALE PRESENT (HCC): Primary | ICD-10-CM

## 2023-11-14 DIAGNOSIS — Z51.81 ENCOUNTER FOR THERAPEUTIC DRUG MONITORING: ICD-10-CM

## 2023-11-14 DIAGNOSIS — Z95.828 PRESENCE OF IVC FILTER: ICD-10-CM

## 2023-11-14 LAB — POC INR: 2.3 (ref 0.8–1.2)

## 2023-11-14 PROCEDURE — 36416 COLLJ CAPILLARY BLOOD SPEC: CPT | Performed by: PHARMACIST

## 2023-11-14 PROCEDURE — 99211 OFF/OP EST MAY X REQ PHY/QHP: CPT | Performed by: PHARMACIST

## 2023-11-14 PROCEDURE — 85610 PROTHROMBIN TIME: CPT | Performed by: PHARMACIST

## 2023-11-14 RX ORDER — WARFARIN SODIUM 2 MG/1
TABLET ORAL
Qty: 180 TABLET | Refills: 3 | Status: SHIPPED | OUTPATIENT
Start: 2023-11-14

## 2023-11-14 NOTE — PROGRESS NOTES
Medication Management 2 [facility]  [phone] (phone)  [phone] (fax)    Ms. Ezequiel Yung is a 80 y.o.  female with history of PE who presents today for anticoagulation monitoring and adjustment. Patient verifies current dosing regimen and tablet strength. Doses given during hospital stay updated on track board. Patient denies s/s bleeding/bruising/swelling/SOB  No blood in urine or stool. No dietary changes. Changes in medication/OTC agents/Herbals. - Ended Lovenox about 3 days ago   - Patient not taking D-Mannose (she states she's supposed to be taking it but hasn't been)   No change in alcohol use or tobacco use. Change in activity level. - Less active than normal   Patient denies falls. No vomiting/diarrhea or acute illness. No Procedures scheduled in the future at this time. No showed to post hospitalization INR appt on 11/7    Assessment:   Lab Results   Component Value Date    INR 2.30 (H) 11/14/2023    INR 2.26 (H) 11/06/2023    INR 1.19 (H) 11/03/2023     INR therapeutic   Recent Labs     11/14/23  1427   INR 2.30*      Patient interview completed and discussed with pharmacist by Fabian Milan PharmD Candidate      Plan:  Continue Coumadin 4mg daily. Recheck INR in 3 week(s). Refill sent to pharmacy. Patient reminded to call the Anticoagulation Clinic with any signs or symptoms of bleeding or with any medication changes. Patient given instructions utilizing the teach back method. After visit summary printed and reviewed with patient. Discharged ambulatory in no apparent distress.       For Pharmacy Admin Tracking Only    Intervention Detail: Refill(s) Provided  Total # of Interventions Recommended: 1  Total # of Interventions Accepted: 1  Time Spent (min): 20

## 2023-12-05 ENCOUNTER — HOSPITAL ENCOUNTER (OUTPATIENT)
Dept: PHARMACY | Age: 88
Setting detail: THERAPIES SERIES
Discharge: HOME | End: 2023-12-05
Payer: MEDICARE

## 2023-12-05 DIAGNOSIS — Z51.81 ENCOUNTER FOR THERAPEUTIC DRUG MONITORING: ICD-10-CM

## 2023-12-05 DIAGNOSIS — I26.99 PULMONARY EMBOLISM, UNSPECIFIED CHRONICITY, UNSPECIFIED PULMONARY EMBOLISM TYPE, UNSPECIFIED WHETHER ACUTE COR PULMONALE PRESENT (HCC): Primary | ICD-10-CM

## 2023-12-05 DIAGNOSIS — Z79.01 ANTICOAGULATED ON COUMADIN: ICD-10-CM

## 2023-12-05 LAB — POC INR: 2.3 (ref 0.8–1.2)

## 2023-12-05 PROCEDURE — 99211 OFF/OP EST MAY X REQ PHY/QHP: CPT

## 2023-12-05 PROCEDURE — 36416 COLLJ CAPILLARY BLOOD SPEC: CPT

## 2023-12-05 PROCEDURE — 85610 PROTHROMBIN TIME: CPT

## 2023-12-05 NOTE — PROGRESS NOTES
Medication Management [address] [place] [address]  [phone] (phone)  [phone] (fax)    Ms. Angela Christy is a 80 y.o.  female with history of PE, per referral from BENNIE Purcell, who presents today for Warfarin monitoring and adjustment (3 week visit - at least 15 minutes early for visit). Patient verifies current dosing regimen and tablet strength. No missed or extra doses. Patient denies bleeding/swelling (wears compression socks bilat. ). Has usual SOB. States bruises on abdomen from Lovenox injections are slowly fading. No blood in urine or stool. No dietary changes, except for very small amount of cranberry relish 11/23, Thanksgiving. No changes in medication/OTC agents/herbals. No change in alcohol use or tobacco use. No change in activity level. Patient denies falls. No vomiting/diarrhea or acute illness. No procedures scheduled in the future at this time. Assessment:   Lab Results   Component Value Date    INR 2.30 (H) 12/05/2023    INR 2.30 (H) 11/14/2023    INR 2.26 (H) 11/06/2023     INR therapeutic - goal 2-3. Recent Labs     12/05/23  1444   INR 2.30*        Plan:  POCT INR performed/result reviewed. Continue Coumadin 4 mg daily PO. Recheck INR in 4 week(s). Patient reminded to call the Anticoagulation Clinic with any signs or symptoms of bleeding or with any medication changes. Patient given instructions utilizing the teach back method. After visit summary printed and reviewed with patient. Discharged ambulatory in no apparent distress.     For Pharmacy Admin Tracking Only    Time Spent (min): 20

## 2024-01-02 ENCOUNTER — HOSPITAL ENCOUNTER (OUTPATIENT)
Dept: PHARMACY | Age: 89
Setting detail: THERAPIES SERIES
Discharge: HOME | End: 2024-01-02
Payer: MEDICARE

## 2024-01-02 DIAGNOSIS — Z51.81 ENCOUNTER FOR THERAPEUTIC DRUG MONITORING: ICD-10-CM

## 2024-01-02 DIAGNOSIS — Z79.01 ANTICOAGULATED ON COUMADIN: ICD-10-CM

## 2024-01-02 DIAGNOSIS — I26.99 PULMONARY EMBOLISM, UNSPECIFIED CHRONICITY, UNSPECIFIED PULMONARY EMBOLISM TYPE, UNSPECIFIED WHETHER ACUTE COR PULMONALE PRESENT (HCC): Primary | ICD-10-CM

## 2024-01-02 LAB — POC INR: 3.4 (ref 0.8–1.2)

## 2024-01-02 PROCEDURE — 85610 PROTHROMBIN TIME: CPT

## 2024-01-02 PROCEDURE — 99212 OFFICE O/P EST SF 10 MIN: CPT

## 2024-01-02 PROCEDURE — 36416 COLLJ CAPILLARY BLOOD SPEC: CPT

## 2024-01-02 NOTE — PROGRESS NOTES
Medication Management Clinic  [facility]  Anticoagulation Clinic  [phone] (phone)  [phone] (fax)    Ms. Emma Mesa is a 89 y.o.  female with history of PE, per referral from BENNIE Rain, who presents today for Warfarin monitoring and adjustment (4 week visit).    Patient verifies current dosing regimen and tablet strength.  No missed or extra doses.  Patient denies bleeding/bruising.  Has usual SOB.  Had more swelling of feet when on them more while cooking over holidays.  Wears compression socks bilat.  No blood in urine or stool.  Dietary changes: had less iceberg lettuce salads - unsure if will resume.  No changes in medication/OTC agents/herbals.  No change in alcohol use or tobacco use.  No change in activity level.  Patient denies falls.  Had a little more stress than usual.  No vomiting/diarrhea or acute illness.   No procedures scheduled in the future at this time.    Assessment:   Lab Results   Component Value Date    INR 3.40 (H) 01/02/2024    INR 2.30 (H) 12/05/2023    INR 2.30 (H) 11/14/2023     INR supratherapeutic - goal 2-3.  Recent Labs     01/02/24  1441   INR 3.40*        Plan:  POCT INR performed/result reviewed.  2 mg today and tomorrow, per policy, then continue Coumadin 4 mg daily PO.  Recheck INR in 2 week(s), per policy.  Patient reminded to call the Anticoagulation Clinic with any signs or symptoms of bleeding or with any medication changes.  Patient given instructions utilizing the teach back method.       After visit summary printed and reviewed with patient.    Advised extra caution.  Discharged ambulatory in no apparent distress, with walking stick.    For Pharmacy Admin Tracking Only    Intervention Detail: Dose Adjustment: 1, reason: Therapy De-escalation  Total # of Interventions Recommended: 1  Total # of Interventions Accepted: 1  Time Spent (min):  20

## 2024-01-16 ENCOUNTER — APPOINTMENT (OUTPATIENT)
Dept: PHARMACY | Age: 89
End: 2024-01-16
Payer: MEDICARE

## 2024-01-23 ENCOUNTER — HOSPITAL ENCOUNTER (OUTPATIENT)
Dept: PHARMACY | Age: 89
Setting detail: THERAPIES SERIES
Discharge: HOME | End: 2024-01-23
Payer: COMMERCIAL

## 2024-01-23 DIAGNOSIS — Z79.01 ANTICOAGULATED ON COUMADIN: ICD-10-CM

## 2024-01-23 DIAGNOSIS — I26.99 PULMONARY EMBOLISM, UNSPECIFIED CHRONICITY, UNSPECIFIED PULMONARY EMBOLISM TYPE, UNSPECIFIED WHETHER ACUTE COR PULMONALE PRESENT (HCC): Primary | ICD-10-CM

## 2024-01-23 DIAGNOSIS — Z51.81 ENCOUNTER FOR THERAPEUTIC DRUG MONITORING: ICD-10-CM

## 2024-01-23 LAB — POC INR: 2.1 (ref 0.8–1.2)

## 2024-01-23 PROCEDURE — 36416 COLLJ CAPILLARY BLOOD SPEC: CPT

## 2024-01-23 PROCEDURE — 99211 OFF/OP EST MAY X REQ PHY/QHP: CPT

## 2024-01-23 PROCEDURE — 85610 PROTHROMBIN TIME: CPT

## 2024-01-23 NOTE — PROGRESS NOTES
Medication Management Clinic  [facility]  Anticoagulation Clinic  [phone] (phone)  [phone] (fax)    Ms. Emma Mesa is a 89 y.o.  female with history of PE who presents today for anticoagulation monitoring and adjustment.    Patient verifies tablet strength. Patient stated that she follows her calendar that she puts on her fridge.  No missed or extra doses.   Patient denies s/s bleeding/bruising/swelling/SOB  No blood in urine or stool.  No dietary changes.  No changes in medication/Herbals. No longer taking scheduled Colace.  No change in alcohol use or tobacco use.  Change in activity level: Has had some increased fatigue over the past few days, attributes to the weather.  Patient denies falls.  No vomiting/diarrhea or acute illness.   No Procedures scheduled in the future at this time.  Also voiced wanting to change anticoagulants, is going to bring up to his PCP at next appointment.    Assessment:   Lab Results   Component Value Date    INR 2.10 (H) 01/23/2024    INR 3.40 (H) 01/02/2024    INR 2.30 (H) 12/05/2023     INR therapeutic   Recent Labs     01/23/24  1539   INR 2.10*     Plan:  Continue Coumadin 4mg daily. Recheck INR in 3 week(s). Patient reminded to call the Anticoagulation Clinic with any signs or symptoms of bleeding or with any medication changes. Patient given instructions utilizing the teach back method.    After visit summary printed and reviewed with patient.      Discharged ambulatory in no apparent distress.    Jen Ramirez, [place]   Pharmacy Resident  1/23/2024 4:02 PM    For Pharmacy Admin Tracking Only  Program: Medication Management  Time Spent (min): 20

## 2024-02-13 ENCOUNTER — HOSPITAL ENCOUNTER (OUTPATIENT)
Dept: PHARMACY | Age: 89
Setting detail: THERAPIES SERIES
Discharge: HOME | End: 2024-02-13
Payer: COMMERCIAL

## 2024-02-13 DIAGNOSIS — Z79.01 ANTICOAGULATED ON COUMADIN: ICD-10-CM

## 2024-02-13 DIAGNOSIS — I26.99 PULMONARY EMBOLISM, UNSPECIFIED CHRONICITY, UNSPECIFIED PULMONARY EMBOLISM TYPE, UNSPECIFIED WHETHER ACUTE COR PULMONALE PRESENT (HCC): Primary | ICD-10-CM

## 2024-02-13 DIAGNOSIS — Z51.81 ENCOUNTER FOR THERAPEUTIC DRUG MONITORING: ICD-10-CM

## 2024-02-13 LAB — POC INR: 1.9 (ref 0.8–1.2)

## 2024-02-13 PROCEDURE — 99211 OFF/OP EST MAY X REQ PHY/QHP: CPT

## 2024-02-13 PROCEDURE — 36416 COLLJ CAPILLARY BLOOD SPEC: CPT

## 2024-02-13 PROCEDURE — 85610 PROTHROMBIN TIME: CPT

## 2024-02-13 NOTE — PROGRESS NOTES
Medication Management Clinic  [facility]  Anticoagulation Clinic  [phone] (phone)  [phone] (fax)    Ms. Emma Mesa is a 89 y.o.  female with history of PE, per referral from BENNIE Rain, who presents today for Warfarin monitoring and adjustment (3 week visit - nearly 1 hour early for visit).    Patient verifies current dosing regimen and tablet strength.  No missed or extra doses.  Patient denies bruising/swelling (wears compressions socks bilat.)/SOB.  Occasionally sees a little blood on tissue after blowing nose, plus sometimes it drips blood; reminded of need to keep membrane moist and how to do so.  No blood in urine or stool.  No dietary changes.  No changes in medication/OTC agents/herbals.  No change in alcohol use or tobacco use.  No change in activity level.  Patient denies falls.  Had more stress lately.  No vomiting/diarrhea or acute illness.   No procedures scheduled in the future at this time.  Sees dentist tomorrow.    Assessment:   Lab Results   Component Value Date    INR 1.90 (H) 02/13/2024    INR 2.10 (H) 01/23/2024    INR 3.40 (H) 01/02/2024     INR subtherapeutic - goal 2-3.  Recent Labs     02/13/24  1427   INR 1.90*      Plan:  POCT INR performed/result reviewed.  6 mg today, per policy, then continue Coumadin 4 mg daily PO.  Recheck INR in 3 week(s), per policy.  Patient reminded to call the Anticoagulation Clinic with any signs or symptoms of bleeding or with any medication changes.  Patient given instructions utilizing the teach back method.       After visit summary printed and reviewed with patient.      Discharged ambulatory in no apparent distress.     For Pharmacy Admin Tracking Only    Intervention Detail: Dose Adjustment: 1, reason: Therapy Optimization  Total # of Interventions Recommended: 1  Total # of Interventions Accepted: 1  Time Spent (min):  20

## 2024-02-20 ENCOUNTER — TELEPHONE (OUTPATIENT)
Dept: PHYSICAL MEDICINE AND REHAB | Age: 89
End: 2024-02-20

## 2024-02-20 NOTE — TELEPHONE ENCOUNTER
PA for Lidocaine 5% ointment renewed for 30 day supply- Submitted to plan  Key: [id number]    Your information has been submitted to [facility] Medicare Part D. [facility] Medicare Part D will review the request and will issue a decision, typically within 1-3 days from your submission.    Approved today  Your request has been approved  Authorization Expiration Date: 5/20/2024

## 2024-03-04 ENCOUNTER — HOSPITAL ENCOUNTER (OUTPATIENT)
Dept: PHARMACY | Age: 89
Setting detail: THERAPIES SERIES
Discharge: HOME | End: 2024-03-04
Payer: COMMERCIAL

## 2024-03-04 DIAGNOSIS — Z79.01 ANTICOAGULATED ON COUMADIN: ICD-10-CM

## 2024-03-04 DIAGNOSIS — I26.99 PULMONARY EMBOLISM, UNSPECIFIED CHRONICITY, UNSPECIFIED PULMONARY EMBOLISM TYPE, UNSPECIFIED WHETHER ACUTE COR PULMONALE PRESENT (HCC): Primary | ICD-10-CM

## 2024-03-04 DIAGNOSIS — Z51.81 ENCOUNTER FOR THERAPEUTIC DRUG MONITORING: ICD-10-CM

## 2024-03-04 LAB — POC INR: 2.4 (ref 0.8–1.2)

## 2024-03-04 PROCEDURE — 36416 COLLJ CAPILLARY BLOOD SPEC: CPT

## 2024-03-04 PROCEDURE — 85610 PROTHROMBIN TIME: CPT

## 2024-03-04 PROCEDURE — 99211 OFF/OP EST MAY X REQ PHY/QHP: CPT

## 2024-03-04 NOTE — PROGRESS NOTES
Medication Management Clinic  [facility]  Anticoagulation Clinic  [phone] (phone)  [phone] (fax)    Ms. Emma Mesa is a 89 y.o.  female with history of  PE, per referral from LISA Rain-CNP, who presents today for Warfarin monitoring and adjustment (3 week visit - at least 25 minutes early for visit).    Patient verifies current dosing regimen and tablet strength.  No missed or extra doses, except for bolus ordered last visit.  Patient denies bleeding/swelling (wears compression socks bilat.). SOB improved. Has a nearly-faded bruise RFA of unknown origin. States has numbness of posterior thighs like she had before surgery - she will discuss with doctor.  No blood in urine or stool.  No dietary changes.  No changes in medication/OTC agents/herbals. Going to [employer] for hair loss supplement; reminded to call this clinic to review with pharmacist.  No change in alcohol use or tobacco use.  Change in activity level: slightly increased - \"I feel better.\"  Patient denies falls.  No vomiting/diarrhea or acute illness.   No procedures scheduled in the future at this time.    Assessment:   Lab Results   Component Value Date    INR 2.40 (H) 03/04/2024    INR 1.90 (H) 02/13/2024    INR 2.10 (H) 01/23/2024     INR therapeutic - goal 2-3.  Recent Labs     03/04/24  1408   INR 2.40*        Plan:  POCT INR performed/result reviewed.  Continue Coumadin 4 mg daily PO.  Recheck INR in 4 week(s).  Patient reminded to call the Anticoagulation Clinic with any signs or symptoms of bleeding or with any medication changes.  Patient given instructions utilizing the teach back method.       After visit summary printed and reviewed with patient.      Discharged ambulatory in no apparent distress.     For Pharmacy Admin Tracking Only    Time Spent (min): 20

## 2024-04-01 ENCOUNTER — OFFICE VISIT (OUTPATIENT)
Dept: FAMILY MEDICINE CLINIC | Age: 89
End: 2024-04-01
Payer: COMMERCIAL

## 2024-04-01 VITALS
TEMPERATURE: 98.2 F | WEIGHT: 159.8 LBS | DIASTOLIC BLOOD PRESSURE: 78 MMHG | HEIGHT: 66 IN | OXYGEN SATURATION: 96 % | SYSTOLIC BLOOD PRESSURE: 122 MMHG | RESPIRATION RATE: 16 BRPM | BODY MASS INDEX: 25.68 KG/M2 | HEART RATE: 75 BPM

## 2024-04-01 DIAGNOSIS — R06.02 SOB (SHORTNESS OF BREATH) ON EXERTION: ICD-10-CM

## 2024-04-01 DIAGNOSIS — Z79.01 ANTICOAGULATED ON COUMADIN: ICD-10-CM

## 2024-04-01 DIAGNOSIS — E03.9 HYPOTHYROIDISM, UNSPECIFIED TYPE: ICD-10-CM

## 2024-04-01 DIAGNOSIS — E78.2 MIXED HYPERLIPIDEMIA: ICD-10-CM

## 2024-04-01 DIAGNOSIS — I49.1 ECTOPIC ATRIAL RHYTHM: ICD-10-CM

## 2024-04-01 DIAGNOSIS — Z00.01 ENCOUNTER FOR GENERAL ADULT MEDICAL EXAMINATION WITH ABNORMAL FINDINGS: ICD-10-CM

## 2024-04-01 DIAGNOSIS — I10 ESSENTIAL HYPERTENSION: ICD-10-CM

## 2024-04-01 DIAGNOSIS — I27.82 OTHER CHRONIC PULMONARY EMBOLISM WITHOUT ACUTE COR PULMONALE (HCC): ICD-10-CM

## 2024-04-01 DIAGNOSIS — Z00.00 MEDICARE ANNUAL WELLNESS VISIT, SUBSEQUENT: Primary | ICD-10-CM

## 2024-04-01 PROCEDURE — 99214 OFFICE O/P EST MOD 30 MIN: CPT | Performed by: NURSE PRACTITIONER

## 2024-04-01 PROCEDURE — G0439 PPPS, SUBSEQ VISIT: HCPCS | Performed by: NURSE PRACTITIONER

## 2024-04-01 PROCEDURE — 93000 ELECTROCARDIOGRAM COMPLETE: CPT | Performed by: NURSE PRACTITIONER

## 2024-04-01 PROCEDURE — 1123F ACP DISCUSS/DSCN MKR DOCD: CPT | Performed by: NURSE PRACTITIONER

## 2024-04-01 RX ORDER — LEVOTHYROXINE SODIUM 0.12 MG/1
125 TABLET ORAL DAILY
Qty: 90 TABLET | Refills: 4 | Status: SHIPPED | OUTPATIENT
Start: 2024-04-01

## 2024-04-01 RX ORDER — DILTIAZEM HYDROCHLORIDE 180 MG/1
360 CAPSULE, EXTENDED RELEASE ORAL DAILY
Qty: 180 CAPSULE | Refills: 4 | Status: SHIPPED | OUTPATIENT
Start: 2024-04-01

## 2024-04-01 RX ORDER — ATORVASTATIN CALCIUM 20 MG/1
20 TABLET, FILM COATED ORAL EVERY EVENING
Qty: 90 TABLET | Refills: 4 | Status: SHIPPED | OUTPATIENT
Start: 2024-04-01

## 2024-04-01 SDOH — ECONOMIC STABILITY: FOOD INSECURITY: WITHIN THE PAST 12 MONTHS, YOU WORRIED THAT YOUR FOOD WOULD RUN OUT BEFORE YOU GOT MONEY TO BUY MORE.: NEVER TRUE

## 2024-04-01 SDOH — ECONOMIC STABILITY: FOOD INSECURITY: WITHIN THE PAST 12 MONTHS, THE FOOD YOU BOUGHT JUST DIDN'T LAST AND YOU DIDN'T HAVE MONEY TO GET MORE.: NEVER TRUE

## 2024-04-01 SDOH — ECONOMIC STABILITY: INCOME INSECURITY: HOW HARD IS IT FOR YOU TO PAY FOR THE VERY BASICS LIKE FOOD, HOUSING, MEDICAL CARE, AND HEATING?: NOT HARD AT ALL

## 2024-04-01 ASSESSMENT — PATIENT HEALTH QUESTIONNAIRE - PHQ9
SUM OF ALL RESPONSES TO PHQ QUESTIONS 1-9: 0
2. FEELING DOWN, DEPRESSED OR HOPELESS: NOT AT ALL
SUM OF ALL RESPONSES TO PHQ9 QUESTIONS 1 & 2: 0
1. LITTLE INTEREST OR PLEASURE IN DOING THINGS: NOT AT ALL
SUM OF ALL RESPONSES TO PHQ QUESTIONS 1-9: 0

## 2024-04-01 ASSESSMENT — LIFESTYLE VARIABLES
HOW MANY STANDARD DRINKS CONTAINING ALCOHOL DO YOU HAVE ON A TYPICAL DAY: PATIENT DOES NOT DRINK
HOW OFTEN DO YOU HAVE A DRINK CONTAINING ALCOHOL: NEVER

## 2024-04-01 NOTE — PROGRESS NOTES
Medicare Annual Wellness Visit    Emma Mesa is here for No chief complaint on file.    Assessment & Plan   Medicare annual wellness visit, subsequent  Hypothyroidism, unspecified type  -     levothyroxine (SYNTHROID) 125 MCG tablet; Take 1 tablet by mouth Daily Indications: Impaired Thyroid Function Pt wants only synthroid brand name  Takes [facility] only. 5-14-18., Disp-90 tablet, R-4, DAWNormal  -     TSH With Reflex Ft4; Future  Essential hypertension  -controlled  -     dilTIAZem (DILACOR XR) 180 MG extended release capsule; Take 2 capsules by mouth daily Indications: High Blood Pressure Disorder, Disp-180 capsule, R-4Normal  -     Lipid Panel; Future  -     Ziggy Dorantes MD, Cardiology, [place]  Anticoagulated on Coumadin  Other chronic pulmonary embolism without acute cor pulmonale (HCC)  Assessment & Plan:   Well-controlled, continue current medications on anticoagulation has a filter in place   Mixed hyperlipidemia  Low fat low cholesterol diet   -     Lipid Panel; Future  -     Basic Metabolic Panel; Future  SOB (shortness of breath) on exertion  Could be due to cardiac rhythm, offered pt echo, chest x-ray and cardiac event monitor but she declines will  f/u with Cardiology  ER precautions given   Considerd CHF but not likely due to physical exam findings   -     Basic Metabolic Panel; Future  -     EKG 12 lead  -     Ziggy Dorantes MD, Cardiology, [place]  Ectopic atrial rhythm  -     Ziggy Dorantes MD, Cardiology, [place]  Encounter for general adult medical examination with abnormal findings    Pt in agreement with plan   Recommendations for Preventive Services Due: see orders and patient instructions/AVS.  Recommended screening schedule for the next 5-10 years is provided to the patient in written form: see Patient Instructions/AVS.     Return in about 6 months (around 10/1/2024) for med refill.     Subjective     PMH:HTN,DVT, thyroid disease, Hx PE and clotting disorder.   The

## 2024-04-04 ENCOUNTER — HOSPITAL ENCOUNTER (OUTPATIENT)
Dept: PHARMACY | Age: 89
Setting detail: THERAPIES SERIES
Discharge: HOME | End: 2024-04-04
Payer: COMMERCIAL

## 2024-04-04 ENCOUNTER — HOSPITAL ENCOUNTER (OUTPATIENT)
Age: 89
Discharge: HOME | End: 2024-04-04
Payer: COMMERCIAL

## 2024-04-04 DIAGNOSIS — E03.9 HYPOTHYROIDISM, UNSPECIFIED TYPE: ICD-10-CM

## 2024-04-04 DIAGNOSIS — I10 ESSENTIAL HYPERTENSION: ICD-10-CM

## 2024-04-04 DIAGNOSIS — E78.2 MIXED HYPERLIPIDEMIA: ICD-10-CM

## 2024-04-04 DIAGNOSIS — Z51.81 ENCOUNTER FOR THERAPEUTIC DRUG MONITORING: ICD-10-CM

## 2024-04-04 DIAGNOSIS — R06.02 SOB (SHORTNESS OF BREATH) ON EXERTION: ICD-10-CM

## 2024-04-04 DIAGNOSIS — I26.99 PULMONARY EMBOLISM, UNSPECIFIED CHRONICITY, UNSPECIFIED PULMONARY EMBOLISM TYPE, UNSPECIFIED WHETHER ACUTE COR PULMONALE PRESENT (HCC): Primary | ICD-10-CM

## 2024-04-04 DIAGNOSIS — Z79.01 ANTICOAGULATED ON COUMADIN: ICD-10-CM

## 2024-04-04 LAB
ANION GAP SERPL CALC-SCNC: 14 MEQ/L (ref 8–16)
BUN SERPL-MCNC: 11 MG/DL (ref 7–22)
CALCIUM SERPL-MCNC: 9.3 MG/DL (ref 8.5–10.5)
CHLORIDE SERPL-SCNC: 99 MEQ/L (ref 98–111)
CHOLEST SERPL-MCNC: 174 MG/DL (ref 100–199)
CO2 SERPL-SCNC: 27 MEQ/L (ref 23–33)
CREAT SERPL-MCNC: 0.6 MG/DL (ref 0.4–1.2)
GFR SERPL CREATININE-BSD FRML MDRD: 85 ML/MIN/1.73M2
GLUCOSE SERPL-MCNC: 111 MG/DL (ref 70–108)
HDLC SERPL-MCNC: 67 MG/DL
LDLC SERPL CALC-MCNC: 80 MG/DL
POC INR: 2.9 (ref 0.8–1.2)
POTASSIUM SERPL-SCNC: 3.9 MEQ/L (ref 3.5–5.2)
SODIUM SERPL-SCNC: 140 MEQ/L (ref 135–145)
TRIGL SERPL-MCNC: 133 MG/DL (ref 0–199)
TSH SERPL DL<=0.005 MIU/L-ACNC: 2.24 UIU/ML (ref 0.4–4.2)

## 2024-04-04 PROCEDURE — 36416 COLLJ CAPILLARY BLOOD SPEC: CPT

## 2024-04-04 PROCEDURE — 85610 PROTHROMBIN TIME: CPT

## 2024-04-04 PROCEDURE — 84443 ASSAY THYROID STIM HORMONE: CPT

## 2024-04-04 PROCEDURE — 80061 LIPID PANEL: CPT

## 2024-04-04 PROCEDURE — 99211 OFF/OP EST MAY X REQ PHY/QHP: CPT

## 2024-04-04 PROCEDURE — 36415 COLL VENOUS BLD VENIPUNCTURE: CPT

## 2024-04-04 PROCEDURE — 80048 BASIC METABOLIC PNL TOTAL CA: CPT

## 2024-04-04 NOTE — PROGRESS NOTES
Medication Management Clinic  [facility]  Anticoagulation Clinic  [phone] (phone)  [phone] (fax)    Ms. Emma Mesa is a 89 y.o.  female with history of PE who presents today for anticoagulation monitoring and adjustment.    Patient verifies current dosing regimen and tablet strength.  No missed or extra doses.  Patient denies s/s bleeding/bruising/swelling/SOB  No blood in urine or stool.  No dietary changes.  No changes in medication/OTC agents/Herbals. No longer takes D-mannose, but may try taking again  No change in alcohol use or tobacco use.  No change in activity level.  Patient denies falls.  No vomiting/diarrhea or acute illness.   No Procedures scheduled in the future at this time.    Assessment:   Goal 2 - 3  Lab Results   Component Value Date    INR 2.90 (H) 04/04/2024    INR 2.40 (H) 03/04/2024    INR 1.90 (H) 02/13/2024     INR therapeutic   Recent Labs     04/04/24  1143   INR 2.90*        Plan:  Continue Coumadin 4 mg daily.  Recheck INR in 4 week(s).  Patient reminded to call the Anticoagulation Clinic with any signs or symptoms of bleeding or with any medication changes.  Patient given instructions utilizing the teach back method.       After visit summary printed and reviewed with patient.      Discharged ambulatory in no apparent distress.    For Pharmacy Admin Tracking Only    Time Spent (min): 20

## 2024-04-05 ENCOUNTER — TELEPHONE (OUTPATIENT)
Dept: FAMILY MEDICINE CLINIC | Age: 89
End: 2024-04-05

## 2024-04-05 NOTE — TELEPHONE ENCOUNTER
----- Message from LISA Kapoor - CNP sent at 4/5/2024 12:51 PM EDT -----  Let pt know labs are stable and in appropriate range,

## 2024-04-10 ENCOUNTER — TELEPHONE (OUTPATIENT)
Dept: FAMILY MEDICINE CLINIC | Age: 89
End: 2024-04-10

## 2024-04-10 DIAGNOSIS — I27.82 OTHER CHRONIC PULMONARY EMBOLISM, UNSPECIFIED WHETHER ACUTE COR PULMONALE PRESENT (HCC): Primary | ICD-10-CM

## 2024-04-10 DIAGNOSIS — Z79.01 LONG TERM (CURRENT) USE OF ANTICOAGULANTS: ICD-10-CM

## 2024-04-10 DIAGNOSIS — Z51.81 ENCOUNTER FOR THERAPEUTIC DRUG MONITORING: ICD-10-CM

## 2024-04-10 NOTE — TELEPHONE ENCOUNTER
----- Message from Dinorah Evans RN sent at 4/10/2024 12:04 PM EDT -----  Please renew annual referral for Anticoagulation Monitoring, #[id number].  Thanks, MIGUEL Evans RN BSN

## 2024-04-11 PROBLEM — Z79.01 ENCOUNTER FOR CURRENT LONG-TERM USE OF ANTICOAGULANTS: Status: ACTIVE | Noted: 2024-04-11

## 2024-04-30 ENCOUNTER — HOSPITAL ENCOUNTER (OUTPATIENT)
Age: 89
Discharge: HOME | End: 2024-04-30
Payer: COMMERCIAL

## 2024-04-30 ENCOUNTER — OFFICE VISIT (OUTPATIENT)
Dept: CARDIOLOGY CLINIC | Age: 89
End: 2024-04-30
Payer: COMMERCIAL

## 2024-04-30 VITALS
SYSTOLIC BLOOD PRESSURE: 153 MMHG | HEIGHT: 66 IN | WEIGHT: 155 LBS | BODY MASS INDEX: 24.91 KG/M2 | DIASTOLIC BLOOD PRESSURE: 78 MMHG | HEART RATE: 76 BPM

## 2024-04-30 DIAGNOSIS — R06.00 DYSPNEA, UNSPECIFIED TYPE: ICD-10-CM

## 2024-04-30 DIAGNOSIS — R06.00 DYSPNEA, UNSPECIFIED TYPE: Primary | ICD-10-CM

## 2024-04-30 LAB — NT-PROBNP SERPL IA-MCNC: 258.4 PG/ML (ref 0–449)

## 2024-04-30 PROCEDURE — 1123F ACP DISCUSS/DSCN MKR DOCD: CPT | Performed by: INTERNAL MEDICINE

## 2024-04-30 PROCEDURE — 36415 COLL VENOUS BLD VENIPUNCTURE: CPT

## 2024-04-30 PROCEDURE — 99204 OFFICE O/P NEW MOD 45 MIN: CPT | Performed by: INTERNAL MEDICINE

## 2024-04-30 PROCEDURE — 83880 ASSAY OF NATRIURETIC PEPTIDE: CPT

## 2024-04-30 NOTE — PROGRESS NOTES
[facility] PHYS[facility] [place] [facility]  [facility] CARDIOLOGY  [address]  [address]  [facility] [address]  Dept: [phone]  Dept Fax: [phone]  Loc: [phone]    Visit Date: 4/30/2024    Ms. Mesa is a 89 y.o. female  who presented for:  Chief Complaint   Patient presents with    New Patient     Sob         HPI:   89 yo F PE/DVT s/p IVC filter and on coumadin, HTN, HLD is referred here shortness of breath. Patient reports shortness of breath for several years. She reports its getting worse.    Had recently had back pain.  Reports 1 pillow orthopnea.          Current Outpatient Medications:     atorvastatin (LIPITOR) 20 MG tablet, Take 1 tablet by mouth every evening Indications: Blood Cholesterol Abnormal, Disp: 90 tablet, Rfl: 4    levothyroxine (SYNTHROID) 125 MCG tablet, Take 1 tablet by mouth Daily Indications: Impaired Thyroid Function Pt wants only synthroid brand name  Takes [facility] only. 5-14-18., Disp: 90 tablet, Rfl: 4    dilTIAZem (DILACOR XR) 180 MG extended release capsule, Take 2 capsules by mouth daily Indications: High Blood Pressure Disorder, Disp: 180 capsule, Rfl: 4    warfarin (JANTOVEN) 2 MG tablet, use as directed by [facility] coumadin clinic 180 tabs = 90 days, Disp: 180 tablet, Rfl: 3    lidocaine (XYLOCAINE) 5 % ointment, Apply topically every 4 hours as needed for Pain (To painful areas on low back and right leg) Apply topically as needed., Disp: 50 g, Rfl: 2    docusate sodium (COLACE, DULCOLAX) 100 MG CAPS, Take 100 mg by mouth 2 times daily, Disp: 60 capsule, Rfl: 0    betamethasone valerate (VALISONE) 0.1 % cream, Apply topically 2 times daily, Disp: , Rfl:     D-MANNOSE PO, Take 1 tablet by mouth in the morning and at bedtime, Disp: , Rfl:     Handicap Placard MISC, by Does not apply route Duration: 3 years Physical Deconditioning, Disp: 1 each, Rfl: 0    Polyethyl Glycol-Propyl Glycol (SYSTANE ULTRA OP), Apply 1 drop to eye 2 times daily, Disp: , Rfl:     
Pt C/O chest pain, feels like something is setting on her chest, SOB, headaches, heart palpations, very fatigued,       Pt denies  
no

## 2024-05-01 ENCOUNTER — HOSPITAL ENCOUNTER (OUTPATIENT)
Age: 89
Discharge: HOME | End: 2024-05-03
Attending: INTERNAL MEDICINE
Payer: COMMERCIAL

## 2024-05-01 DIAGNOSIS — R06.00 DYSPNEA, UNSPECIFIED TYPE: ICD-10-CM

## 2024-05-01 PROCEDURE — 93225 XTRNL ECG REC<48 HRS REC: CPT

## 2024-05-02 ENCOUNTER — HOSPITAL ENCOUNTER (OUTPATIENT)
Dept: PHARMACY | Age: 89
Setting detail: THERAPIES SERIES
Discharge: HOME | End: 2024-05-02
Payer: COMMERCIAL

## 2024-05-02 VITALS — SYSTOLIC BLOOD PRESSURE: 118 MMHG | DIASTOLIC BLOOD PRESSURE: 76 MMHG | HEART RATE: 76 BPM

## 2024-05-02 DIAGNOSIS — Z51.81 ENCOUNTER FOR THERAPEUTIC DRUG MONITORING: ICD-10-CM

## 2024-05-02 DIAGNOSIS — I26.99 PULMONARY EMBOLISM, UNSPECIFIED CHRONICITY, UNSPECIFIED PULMONARY EMBOLISM TYPE, UNSPECIFIED WHETHER ACUTE COR PULMONALE PRESENT (HCC): Primary | ICD-10-CM

## 2024-05-02 DIAGNOSIS — Z79.01 ANTICOAGULATED ON COUMADIN: ICD-10-CM

## 2024-05-02 DIAGNOSIS — Z95.828 PRESENCE OF IVC FILTER: ICD-10-CM

## 2024-05-02 LAB — POC INR: 2.4 (ref 0.8–1.2)

## 2024-05-02 PROCEDURE — 36416 COLLJ CAPILLARY BLOOD SPEC: CPT

## 2024-05-02 PROCEDURE — 85610 PROTHROMBIN TIME: CPT

## 2024-05-02 PROCEDURE — 99212 OFFICE O/P EST SF 10 MIN: CPT

## 2024-05-02 RX ORDER — WARFARIN SODIUM 2 MG/1
TABLET ORAL
Qty: 180 TABLET | Refills: 3 | Status: SHIPPED | OUTPATIENT
Start: 2024-05-02

## 2024-05-02 NOTE — PROGRESS NOTES
Medication Management Clinic  [facility]  Anticoagulation Clinic  [phone] (phone)  [phone] (fax)    Ms. Emma Mesa is a 89 y.o.  female with history of PE who presents today for anticoagulation monitoring and adjustment.    Patient verifies current dosing regimen and tablet strength.  No missed or extra doses.  Patient denies s/s swelling. Has been getting bruising on her face off and on for the past two weeks. Has a little bruise on her left cheek that she noticed when she got up this morning. Does not know why they occur, but she does report that they resolve over time. Pt did report that she bled from her foot for a little bit a few weeks ago but resolved timely. Has some shortness of breath and shakiness, has gotten worse. Patient is wearing a 48 hour heart monitor, stops wearing tomorrow. Cardiology planning on assessing after the results from the monitor. Overall, advised her if her bruises are not resolving or she has bleeding that will not stop to talk to her provider.  No blood in urine or stool.  Dietary changes: Has been trying to eat less.  No changes in medication/OTC agents/Herbals.  No change in alcohol use or tobacco use.  Change in activity level: Patient endorses increased fatigue and shortness of breath when walking distances. Patient reports needing to sit in her recliner more.  Patient denies falls.  No vomiting/diarrhea or acute illness.   No Procedures scheduled in the future at this time.     Patient endorses being shaky. Took vitals - /76, HR 76    Trying to travel to [place] 5/24-5/26 depending on how she feels.    Assessment:   Lab Results   Component Value Date    INR 2.40 (H) 05/02/2024    INR 2.90 (H) 04/04/2024    INR 2.40 (H) 03/04/2024     INR therapeutic   Recent Labs     05/02/24  1423   INR 2.40*     Plan:  Refills provided. Continue Coumadin 4 mg daily. Recheck INR in 5 week(s). Patient reminded to call the Anticoagulation Clinic with any signs

## 2024-05-03 ENCOUNTER — HOSPITAL ENCOUNTER (OUTPATIENT)
Age: 89
End: 2024-05-03
Attending: INTERNAL MEDICINE
Payer: COMMERCIAL

## 2024-05-03 DIAGNOSIS — R06.00 DYSPNEA, UNSPECIFIED TYPE: ICD-10-CM

## 2024-05-03 LAB
ECHO AV CUSP MM: 1.5 CM
ECHO AV PEAK GRADIENT: 9 MMHG
ECHO AV PEAK VELOCITY: 1.5 M/S
ECHO AV VELOCITY RATIO: 0.93
ECHO LA AREA 2C: 17.5 CM2
ECHO LA AREA 4C: 14.3 CM2
ECHO LA DIAMETER: 3.3 CM
ECHO LA MAJOR AXIS: 4.6 CM
ECHO LA MINOR AXIS: 5.7 CM
ECHO LA VOL BP: 44 ML (ref 22–52)
ECHO LA VOL MOD A2C: 44 ML (ref 22–52)
ECHO LA VOL MOD A4C: 36 ML (ref 22–52)
ECHO LV E' LATERAL VELOCITY: 7 CM/S
ECHO LV E' SEPTAL VELOCITY: 7 CM/S
ECHO LV FRACTIONAL SHORTENING: 32 % (ref 28–44)
ECHO LV INTERNAL DIMENSION DIASTOLIC: 3.4 CM (ref 3.9–5.3)
ECHO LV INTERNAL DIMENSION SYSTOLIC: 2.3 CM
ECHO LV IVSD: 1 CM (ref 0.6–0.9)
ECHO LV MASS 2D: 78.3 G (ref 67–162)
ECHO LV POSTERIOR WALL DIASTOLIC: 0.7 CM (ref 0.6–0.9)
ECHO LV RELATIVE WALL THICKNESS RATIO: 0.41
ECHO LVOT PEAK GRADIENT: 8 MMHG
ECHO LVOT PEAK VELOCITY: 1.4 M/S
ECHO MV A VELOCITY: 0.87 M/S
ECHO MV E DECELERATION TIME (DT): 243 MS
ECHO MV E VELOCITY: 0.97 M/S
ECHO MV E/A RATIO: 1.11
ECHO MV E/E' LATERAL: 13.86
ECHO MV E/E' RATIO (AVERAGED): 13.86
ECHO MV REGURGITANT PEAK GRADIENT: 55 MMHG
ECHO MV REGURGITANT PEAK VELOCITY: 3.7 M/S
ECHO PV MAX VELOCITY: 0.8 M/S
ECHO PV PEAK GRADIENT: 2 MMHG
ECHO RV INTERNAL DIMENSION: 2.6 CM
ECHO TV E WAVE: 0.6 M/S
ECHO TV REGURGITANT MAX VELOCITY: 2.57 M/S
ECHO TV REGURGITANT PEAK GRADIENT: 26 MMHG

## 2024-05-03 PROCEDURE — 93306 TTE W/DOPPLER COMPLETE: CPT

## 2024-05-03 PROCEDURE — 93306 TTE W/DOPPLER COMPLETE: CPT | Performed by: NUCLEAR MEDICINE

## 2024-05-12 LAB
ACQUISITION DURATION: NORMAL S
AVERAGE HEART RATE: 68 BPM
HOOKUP DATE: NORMAL
HOOKUP TIME: NORMAL
MAX HEART RATE TIME/DATE: NORMAL
MAX HEART RATE: 106 BPM
MIN HEART RATE TIME/DATE: NORMAL
MIN HEART RATE: 53 BPM
NUMBER OF QRS COMPLEXES: NORMAL
NUMBER OF SUPRAVENTRICULAR COUPLETS: 0
NUMBER OF SUPRAVENTRICULAR ECTOPICS: 158
NUMBER OF SUPRAVENTRICULAR ISOLATED BEATS: 142
NUMBER OF VENTRICULAR BIGEMINAL CYCLES: 0
NUMBER OF VENTRICULAR COUPLETS: 0
NUMBER OF VENTRICULAR ECTOPICS: 17

## 2024-05-31 ENCOUNTER — TELEPHONE (OUTPATIENT)
Dept: PHYSICAL MEDICINE AND REHAB | Age: 89
End: 2024-05-31

## 2024-05-31 NOTE — TELEPHONE ENCOUNTER
PA sent to plan    (Key: [id number])  PA Case ID #: [id number]  Rx #: [id number]    Your information has been submitted to [facility] Medicare Part D. [facility] Medicare Part D will review the request and will issue a decision, typically within 1-3 days from your submission.

## 2024-06-03 NOTE — PROGRESS NOTES
[facility] PROFESSIONAL SERVICES  [facility]   [address]   [address]   [place] [address]   Dept: [phone]   Dept Fax: [phone]   Loc: [phone]      Chief Complaint   Patient presents with    Follow-up     1 month follow up     Shortness of Breath     Cardiologist:  Dr. Washington  89yo female presents for 1 month f/u for SOB. Hx of PE/DVT s/p ivc filter on coumadin, HTN, HLD. Event basically normal, normal BNP.     Continued symptoms, not caught during monitor. Had them the next day. She also had an incident at home hwere working in the garden and got caught by tomato cage under her eye. Bruised with some edema. Still with the chest pressure, some sob and possible palpitaitons. Reviewed holter findings with her.     General:   No fever, no chills, no weight loss, no fatigue  Pulmonary:    + dyspnea, no wheezing  Cardiac:    Denies recent chest pain, + chest pressure   GI:     No nausea or vomiting, no abdominal pain  Neuro:    No dizziness or light headedness  Musculoskeletal:  No recent active issues  Extremities:   No edema      Past Medical History:   Diagnosis Date    Arthritis     wrist, back, neck, foot    Blood circulation, collateral     Breathing difficulty     Chronic kidney disease     DVT (deep venous thrombosis) (HCC)     History of blood transfusion 1960's    child birth    Hx of blood clots 2013???    left leg    Hyperlipidemia     Hypertension     Lumbar radiculitis 10/30/2023    Pneumonia     Prolonged emergence from general anesthesia     with hysterectomy??    Thyroid disease     Unspecified diseases of blood and blood-forming organs        Allergies   Allergen Reactions    Bactrim [Sulfamethoxazole-Trimethoprim] Nausea Only    Milk-Related Compounds      Feels worse and has bladder problems with milk.    Tramadol Other (See Comments)     Patient felt \"not right\"    Codeine Nausea And Vomiting       Current Outpatient Medications   Medication Sig Dispense

## 2024-06-05 ENCOUNTER — OFFICE VISIT (OUTPATIENT)
Dept: CARDIOLOGY CLINIC | Age: 89
End: 2024-06-05
Payer: COMMERCIAL

## 2024-06-05 VITALS
SYSTOLIC BLOOD PRESSURE: 134 MMHG | HEIGHT: 66 IN | BODY MASS INDEX: 24.56 KG/M2 | DIASTOLIC BLOOD PRESSURE: 70 MMHG | WEIGHT: 152.8 LBS | HEART RATE: 75 BPM

## 2024-06-05 DIAGNOSIS — I10 PRIMARY HYPERTENSION: Primary | ICD-10-CM

## 2024-06-05 DIAGNOSIS — R00.2 PALPITATIONS: ICD-10-CM

## 2024-06-05 PROCEDURE — 99214 OFFICE O/P EST MOD 30 MIN: CPT | Performed by: STUDENT IN AN ORGANIZED HEALTH CARE EDUCATION/TRAINING PROGRAM

## 2024-06-05 PROCEDURE — 1123F ACP DISCUSS/DSCN MKR DOCD: CPT | Performed by: STUDENT IN AN ORGANIZED HEALTH CARE EDUCATION/TRAINING PROGRAM

## 2024-06-06 ENCOUNTER — HOSPITAL ENCOUNTER (OUTPATIENT)
Dept: PHARMACY | Age: 89
Setting detail: THERAPIES SERIES
Discharge: HOME | End: 2024-06-06
Payer: COMMERCIAL

## 2024-06-06 DIAGNOSIS — I26.99 PULMONARY EMBOLISM, UNSPECIFIED CHRONICITY, UNSPECIFIED PULMONARY EMBOLISM TYPE, UNSPECIFIED WHETHER ACUTE COR PULMONALE PRESENT (HCC): Primary | ICD-10-CM

## 2024-06-06 DIAGNOSIS — Z51.81 ENCOUNTER FOR THERAPEUTIC DRUG MONITORING: ICD-10-CM

## 2024-06-06 DIAGNOSIS — Z79.01 ANTICOAGULATED ON COUMADIN: ICD-10-CM

## 2024-06-06 LAB — POC INR: 2.9 (ref 0.8–1.2)

## 2024-06-06 PROCEDURE — 85610 PROTHROMBIN TIME: CPT

## 2024-06-06 PROCEDURE — 36416 COLLJ CAPILLARY BLOOD SPEC: CPT

## 2024-06-06 PROCEDURE — 99211 OFF/OP EST MAY X REQ PHY/QHP: CPT

## 2024-06-06 NOTE — PROGRESS NOTES
Medication Management Clinic  [facility]  Anticoagulation Clinic  [phone] (phone)  [phone] (fax)    Ms. Emma Mesa is a 89 y.o.  female with history of PE who presents today for anticoagulation monitoring and adjustment.    Patient verifies current dosing regimen and tablet strength.  No missed or extra doses.  Patient denies s/s bleeding/swelling/SOB  Large bruise on right cheek from gardening injury 5/28/24 with round tomato cage. She states it has improved, swelling  has gone down and bruising improved. States she was evaluated by granddaughter who is a nurse practitioner and she has no other symptoms than the bruise.   No blood in urine or stool.  No dietary changes.  No changes in medication/OTC agents/Herbals.  No change in alcohol use or tobacco use.  No change in activity level.  Patient denies falls.  No vomiting/diarrhea or acute illness.   No Procedures scheduled in the future at this time.    Assessment:   Lab Results   Component Value Date    INR 2.90 (H) 06/06/2024    INR 2.40 (H) 05/02/2024    INR 2.90 (H) 04/04/2024     INR therapeutic   Recent Labs     06/06/24  1315   INR 2.90*     Plan:  Given the large bruise on patient's cheek and her being near upper end of goal, discussed with patient taking 2 mg today instead of 4 mg, then resuming 4 mg daily.  Patient was happy with this plan. Recheck INR in 5 week(s).  Patient reminded to call the Anticoagulation Clinic with any signs or symptoms of bleeding or with any medication changes.  Patient given instructions utilizing the teach back method.    After visit summary printed and reviewed with patient.      Discharged ambulatory in no apparent distress.    For Pharmacy Admin Tracking Only  Intervention Detail: Dose Adjustment: 1, reason: Therapy Optimization  Total # of Interventions Recommended: 1  Total # of Interventions Accepted: 1  Time Spent (min): 20       Private car

## 2024-07-11 ENCOUNTER — HOSPITAL ENCOUNTER (OUTPATIENT)
Dept: PHARMACY | Age: 89
Setting detail: THERAPIES SERIES
Discharge: HOME | End: 2024-07-11
Payer: COMMERCIAL

## 2024-07-11 DIAGNOSIS — I26.99 PULMONARY EMBOLISM, UNSPECIFIED CHRONICITY, UNSPECIFIED PULMONARY EMBOLISM TYPE, UNSPECIFIED WHETHER ACUTE COR PULMONALE PRESENT (HCC): Primary | ICD-10-CM

## 2024-07-11 DIAGNOSIS — Z79.01 ANTICOAGULATED ON COUMADIN: ICD-10-CM

## 2024-07-11 DIAGNOSIS — Z51.81 ENCOUNTER FOR THERAPEUTIC DRUG MONITORING: ICD-10-CM

## 2024-07-11 DIAGNOSIS — Z79.01 LONG TERM (CURRENT) USE OF ANTICOAGULANTS: ICD-10-CM

## 2024-07-11 LAB — POC INR: 2.1 (ref 0.8–1.2)

## 2024-07-11 PROCEDURE — 36416 COLLJ CAPILLARY BLOOD SPEC: CPT

## 2024-07-11 PROCEDURE — 85610 PROTHROMBIN TIME: CPT

## 2024-07-11 PROCEDURE — 99211 OFF/OP EST MAY X REQ PHY/QHP: CPT

## 2024-07-11 NOTE — PROGRESS NOTES
Medication Management Clinic  [facility]  Anticoagulation Clinic  [phone] (phone)  [phone] (fax)    Ms. Emma Mesa is a 89 y.o.  female with history of PE/long-term use of anticoagulants, per referral from BENNIE Rain, who presents today for Warfarin monitoring and adjustment (5 week visit).    Patient verifies current dosing regimen and tablet strength.  No missed or extra doses.  Patient denies bleeding/SOB.  Has usual swelling of lower legs - wears knee-high compression socks bilat. Has usual easy bruising.  Bruise on right side of face nearly resolved - injury before last visit.  Has usual low energy.  No blood in urine or stool.  No dietary changes.  No changes in medication/OTC agents/herbals.  No change in alcohol use or tobacco use.  No change in activity level.  Patient denies falls.  No vomiting/diarrhea or acute illness.   No procedures scheduled in the future at this time.    Assessment:     Lab Results   Component Value Date    INR 2.10 (H) 07/11/2024    INR 2.90 (H) 06/06/2024    INR 2.40 (H) 05/02/2024     INR therapeutic -  goal 2-3.  Recent Labs     07/11/24  1310   INR 2.10*      Plan:  POCT INR performed/result reviewed.  Continue Coumadin 4 mg daily PO.  Recheck INR in 5 week(s).  Patient reminded to call the Anticoagulation Clinic with any signs or symptoms of bleeding or with any medication changes.  Patient given instructions utilizing the teach back method.       After visit summary printed and reviewed with patient.      Discharged ambulatory in no apparent distress, with walking stick.     For Pharmacy Admin Tracking Only    Time Spent (min): 20

## 2024-08-15 ENCOUNTER — ANTI-COAG VISIT (OUTPATIENT)
Age: 89
End: 2024-08-15
Payer: COMMERCIAL

## 2024-08-15 DIAGNOSIS — Z51.81 ENCOUNTER FOR THERAPEUTIC DRUG MONITORING: ICD-10-CM

## 2024-08-15 DIAGNOSIS — Z79.01 ANTICOAGULATED ON COUMADIN: Primary | ICD-10-CM

## 2024-08-15 DIAGNOSIS — I26.99 PULMONARY EMBOLISM, UNSPECIFIED CHRONICITY, UNSPECIFIED PULMONARY EMBOLISM TYPE, UNSPECIFIED WHETHER ACUTE COR PULMONALE PRESENT (HCC): ICD-10-CM

## 2024-08-15 DIAGNOSIS — Z79.01 LONG TERM (CURRENT) USE OF ANTICOAGULANTS: ICD-10-CM

## 2024-08-15 LAB — POC INR: 2 (ref 0.8–1.2)

## 2024-08-15 PROCEDURE — 85610 PROTHROMBIN TIME: CPT

## 2024-08-15 PROCEDURE — 99211 OFF/OP EST MAY X REQ PHY/QHP: CPT

## 2024-08-15 NOTE — PROGRESS NOTES
Medication Management Clinic  [facility]  Anticoagulation Clinic  [phone] (phone)  [phone] (fax)    Ms. Emma Mesa is a 89 y.o.  female with history of PE/long-term use of anticoagulants, per referral from BENNIE Rain, who presents today for Warfarin monitoring and adjustment (5 week visit).    Patient verifies current dosing regimen and tablet strength.  No missed or extra doses.  Patient denies bleeding. SOB slightly decreased. Has usual easy bruising - noted most of left middle finger has fading bruise. States swelling is gone with diet change.  Still wears compression socks bilat.  No blood in urine or stool.  Dietary changes: cut out coffee and decreased bread and sugars 8 days ago. Feels better.  Has lost \"a little weight.\"  No changes in medication/OTC agents/herbals.  No change in alcohol use or tobacco use.  No change in activity level.  Patient denies falls.  No vomiting/diarrhea or acute illness. Became very diaphoretic/weak one day after running errands without having eaten.  No procedures scheduled in the future at this time.    Assessment:   Lab Results   Component Value Date    INR 2.00 (H) 08/15/2024    INR 2.10 (H) 07/11/2024    INR 2.90 (H) 06/06/2024     INR therapeutic - goal 2-3.  Recent Labs     08/15/24  1326   INR 2.00*        Plan:  POCT INR performed/result reviewed.  Continue Coumadin 4 mg daily PO.  Recheck INR in 5 week(s).  Patient reminded to call the Anticoagulation Clinic with any signs or symptoms of bleeding or with any medication changes.  Patient given instructions utilizing the teach back method.       After visit summary printed and reviewed with patient.      Discharged ambulatory in no apparent distress, with walking stick.    For Pharmacy Admin Tracking Only    Time Spent (min): 20

## 2024-09-19 ENCOUNTER — ANTI-COAG VISIT (OUTPATIENT)
Age: 89
End: 2024-09-19
Payer: COMMERCIAL

## 2024-09-19 DIAGNOSIS — Z51.81 ENCOUNTER FOR THERAPEUTIC DRUG MONITORING: ICD-10-CM

## 2024-09-19 DIAGNOSIS — Z79.01 LONG TERM (CURRENT) USE OF ANTICOAGULANTS: ICD-10-CM

## 2024-09-19 DIAGNOSIS — I26.99 PULMONARY EMBOLISM, UNSPECIFIED CHRONICITY, UNSPECIFIED PULMONARY EMBOLISM TYPE, UNSPECIFIED WHETHER ACUTE COR PULMONALE PRESENT (HCC): ICD-10-CM

## 2024-09-19 DIAGNOSIS — Z79.01 ANTICOAGULATED ON COUMADIN: Primary | ICD-10-CM

## 2024-09-19 LAB — POC INR: 2.9 (ref 0.8–1.2)

## 2024-09-19 PROCEDURE — 85610 PROTHROMBIN TIME: CPT

## 2024-09-19 PROCEDURE — 99211 OFF/OP EST MAY X REQ PHY/QHP: CPT

## 2024-10-01 ENCOUNTER — OFFICE VISIT (OUTPATIENT)
Dept: FAMILY MEDICINE CLINIC | Age: 89
End: 2024-10-01
Payer: COMMERCIAL

## 2024-10-01 VITALS
WEIGHT: 152.8 LBS | SYSTOLIC BLOOD PRESSURE: 136 MMHG | OXYGEN SATURATION: 92 % | BODY MASS INDEX: 24.56 KG/M2 | HEIGHT: 66 IN | TEMPERATURE: 98.2 F | HEART RATE: 70 BPM | RESPIRATION RATE: 16 BRPM | DIASTOLIC BLOOD PRESSURE: 68 MMHG

## 2024-10-01 DIAGNOSIS — Z79.01 ANTICOAGULATED ON COUMADIN: ICD-10-CM

## 2024-10-01 DIAGNOSIS — Z86.711 HISTORY OF PULMONARY EMBOLISM: ICD-10-CM

## 2024-10-01 DIAGNOSIS — E07.9 THYROID DISEASE: ICD-10-CM

## 2024-10-01 DIAGNOSIS — I10 PRIMARY HYPERTENSION: Primary | ICD-10-CM

## 2024-10-01 DIAGNOSIS — M81.0 AGE-RELATED OSTEOPOROSIS WITHOUT CURRENT PATHOLOGICAL FRACTURE: ICD-10-CM

## 2024-10-01 DIAGNOSIS — L20.82 FLEXURAL ECZEMA: ICD-10-CM

## 2024-10-01 PROBLEM — M54.16 LUMBAR RADICULITIS: Status: RESOLVED | Noted: 2023-10-30 | Resolved: 2024-10-01

## 2024-10-01 PROBLEM — M54.59 INTRACTABLE LOW BACK PAIN: Status: RESOLVED | Noted: 2023-10-30 | Resolved: 2024-10-01

## 2024-10-01 PROBLEM — E03.9 ACQUIRED HYPOTHYROIDISM: Status: RESOLVED | Noted: 2018-02-07 | Resolved: 2024-10-01

## 2024-10-01 PROBLEM — M80.00XA AGE-RELATED OSTEOPOROSIS WITH CURRENT PATHOLOGICAL FRACTURE: Status: RESOLVED | Noted: 2023-10-30 | Resolved: 2024-10-01

## 2024-10-01 PROBLEM — G89.29 ACUTE EXACERBATION OF CHRONIC LOW BACK PAIN: Status: RESOLVED | Noted: 2023-10-30 | Resolved: 2024-10-01

## 2024-10-01 PROBLEM — U07.1 COVID-19: Status: RESOLVED | Noted: 2021-12-20 | Resolved: 2024-10-01

## 2024-10-01 PROBLEM — M54.50 ACUTE EXACERBATION OF CHRONIC LOW BACK PAIN: Status: RESOLVED | Noted: 2023-10-30 | Resolved: 2024-10-01

## 2024-10-01 PROBLEM — M48.56XA PATHOLOGIC COMPRESSION FRACTURE OF LUMBAR VERTEBRA (HCC): Status: RESOLVED | Noted: 2023-10-30 | Resolved: 2024-10-01

## 2024-10-01 PROBLEM — J10.1 INFLUENZA B: Status: RESOLVED | Noted: 2018-02-04 | Resolved: 2024-10-01

## 2024-10-01 PROCEDURE — 99214 OFFICE O/P EST MOD 30 MIN: CPT | Performed by: NURSE PRACTITIONER

## 2024-10-01 PROCEDURE — 1123F ACP DISCUSS/DSCN MKR DOCD: CPT | Performed by: NURSE PRACTITIONER

## 2024-10-01 RX ORDER — BETAMETHASONE VALERATE 1.2 MG/G
CREAM TOPICAL 2 TIMES DAILY
Qty: 45 G | Refills: 3 | Status: SHIPPED | OUTPATIENT
Start: 2024-10-01

## 2024-10-01 ASSESSMENT — ENCOUNTER SYMPTOMS
RESPIRATORY NEGATIVE: 1
BACK PAIN: 1

## 2024-10-01 NOTE — PROGRESS NOTES
[facility]  [address]  [place] [address]  Dept: [phone]  Dept Fax: [phone]  Loc: [phone]    Emma Mesa is a 89 y.o. femalewho presents today for her medical conditions/complaints as noted below.  Emma Barriga c/o of Medication Refill      :     HPI    PMH:HTN,DVT, thyroid disease, Hx PE and clotting disorder. Osteoporosis, Hx PE and Hx compression fracture , hyperlipidemia   The following acute and/or chronic problems were also addressed today:      Pt lives alone does her own cooking, cleaning and laundry drives to her appt     Hypothyroidism  -controlled with synthroid 125 mcg daily  - no symptoms    HTN/Hyperlipidemia   -controlled with diltiazem 180 mg daily  Taking lipitor 20 mg daily  -hs not seen cardiology     Hx of PE  -on chronic anticoagulation   -managed by [facility]    Hx of compression fracture with healing  Has some joint pain at times arthritis will take tylenol and it works for her  Worse with weather changes     Osteoporosis  Taking daily MVI and extra D3 200 units a day  Declines DEXA scan bit sure when last one was      Will have eczema on her LE at times uses steroid cream to heal it up     Patient's complete Health Risk Assessment and screening values have been reviewed and are found in Flowsheets. The following problems were reviewed today and where indicated follow up appointments were made and/or referrals ordered.      Last DEXA scan  unknown   Last mammogram 1/2021     Lab Results   Component Value Date    WBC 11.4 (H) 11/06/2023    HGB 14.6 11/06/2023    HCT 44.8 11/06/2023    MCV 91.2 11/06/2023     11/06/2023      Lab Results   Component Value Date     04/04/2024    K 3.9 04/04/2024    CL 99 04/04/2024    CO2 27 04/04/2024    BUN 11 04/04/2024    CREATININE 0.6 04/04/2024    GLUCOSE 111 (H) 04/04/2024    CALCIUM 9.3 04/04/2024    BILITOT 0.8 10/26/2023    ALKPHOS 128 (H) 10/26/2023    AST

## 2024-10-31 ENCOUNTER — ANTI-COAG VISIT (OUTPATIENT)
Age: 89
End: 2024-10-31
Payer: COMMERCIAL

## 2024-10-31 ENCOUNTER — APPOINTMENT (OUTPATIENT)
Age: 89
End: 2024-10-31
Payer: COMMERCIAL

## 2024-10-31 DIAGNOSIS — Z51.81 ENCOUNTER FOR THERAPEUTIC DRUG MONITORING: ICD-10-CM

## 2024-10-31 DIAGNOSIS — Z79.01 ANTICOAGULATED ON COUMADIN: Primary | ICD-10-CM

## 2024-10-31 LAB — POC INR: 2.4 (ref 0.8–1.2)

## 2024-10-31 PROCEDURE — 85610 PROTHROMBIN TIME: CPT

## 2024-10-31 PROCEDURE — 99211 OFF/OP EST MAY X REQ PHY/QHP: CPT

## 2024-10-31 NOTE — PROGRESS NOTES
Medication Management Clinic  [facility]  Anticoagulation Clinic  [phone] (phone)  [phone] (fax)    Ms. Emma Mesa is a 89 y.o.  female with history of PE who presents today for anticoagulation monitoring and adjustment.    Patient verifies current dosing regimen and tablet strength.  No missed or extra doses.  Patient denies s/s bleeding/bruising/swelling/SOB  No blood in urine or stool.  No dietary changes.  No changes in medication/OTC agents/Herbals.  No change in alcohol use or tobacco use.  Has been slightly more active.  Patient denies falls.  No vomiting/diarrhea or acute illness.   No Procedures scheduled in the future at this time.    Assessment:   Lab Results   Component Value Date    INR 2.40 (H) 10/31/2024    INR 2.90 (H) 09/19/2024    INR 2.00 (H) 08/15/2024     INR therapeutic   Recent Labs     10/31/24  0826   INR 2.40*      Plan:  Continue Coumadin 4 mg daily. Recheck INR in 6 week(s).  Patient reminded to call the Anticoagulation Clinic with any signs or symptoms of bleeding or with any medication changes.  Patient given instructions utilizing the teach back method.       After visit summary printed and reviewed with patient.      Discharged ambulatory in no apparent distress.  Lolis Martinez PharmD 10/31/2024 8:45 AM     For Pharmacy Admin Tracking Only    Time Spent (min): 15

## 2024-12-11 DIAGNOSIS — Z79.01 ANTICOAGULATED ON COUMADIN: Primary | ICD-10-CM

## 2024-12-11 DIAGNOSIS — Z79.01 LONG TERM (CURRENT) USE OF ANTICOAGULANTS: ICD-10-CM

## 2024-12-11 DIAGNOSIS — Z86.711 HISTORY OF PULMONARY EMBOLISM: ICD-10-CM

## 2024-12-12 ENCOUNTER — ANTI-COAG VISIT (OUTPATIENT)
Age: 88
End: 2024-12-12
Payer: COMMERCIAL

## 2024-12-12 DIAGNOSIS — Z51.81 ENCOUNTER FOR THERAPEUTIC DRUG MONITORING: ICD-10-CM

## 2024-12-12 DIAGNOSIS — Z79.01 ANTICOAGULATED ON COUMADIN: Primary | ICD-10-CM

## 2024-12-12 LAB — POC INR: 1.9 (ref 0.8–1.2)

## 2024-12-12 PROCEDURE — 99211 OFF/OP EST MAY X REQ PHY/QHP: CPT

## 2024-12-12 PROCEDURE — 85610 PROTHROMBIN TIME: CPT

## 2024-12-12 NOTE — PROGRESS NOTES
Medication Management Clinic  [facility]  Anticoagulation Clinic  [phone] (phone)  [phone] (fax)    Ms. Emma Mesa is a 89 y.o.  female with history of PE who presents today for anticoagulation monitoring and adjustment.    Patient verifies current dosing regimen and tablet strength.  No missed or extra doses.  Patient denies s/s bleeding/bruising/swelling/SOB  No blood in urine or stool.  Had a small serving of cranberry salad at [facility].   No changes in medication/OTC agents/Herbals.  No change in alcohol use or tobacco use.  No change in activity level.  Had a fall 3 days before [facility]. Did not get examined. Admits to hitting head. Self examined after the fall. Educated patient on the importance of being evaluated in ER due to being on blood thinner if hits head again.  No vomiting/diarrhea or acute illness.   No Procedures scheduled in the future at this time.     Assessment:   Lab Results   Component Value Date    INR 1.90 (H) 12/12/2024    INR 2.40 (H) 10/31/2024    INR 2.90 (H) 09/19/2024     INR subtherapeutic   Recent Labs     12/12/24  1416   INR 1.90*        Plan:  Take 5 mg today then continue Coumadin 4 mg daily.  Recheck INR in 6 week(s).  Patient reminded to call the Anticoagulation Clinic with any signs or symptoms of bleeding or with any medication changes.  Patient given instructions utilizing the teach back method.    Patient interview completed and discussed with pharmacist by J CARLOS WeberD Candidate 2025.    After visit summary printed and reviewed with patient.      Discharged ambulatory in no apparent distress.     For Pharmacy Admin Tracking Only    Intervention Detail: Dose Adjustment: 1, reason: Therapy Optimization  Total # of Interventions Recommended: 1  Total # of Interventions Accepted: 1  Time Spent (min): 20

## 2024-12-31 ENCOUNTER — HOSPITAL ENCOUNTER (OUTPATIENT)
Age: 88
Discharge: HOME | End: 2024-12-31
Payer: COMMERCIAL

## 2024-12-31 DIAGNOSIS — Z86.711 HISTORY OF PULMONARY EMBOLISM: ICD-10-CM

## 2024-12-31 DIAGNOSIS — Z79.01 ANTICOAGULATED ON COUMADIN: ICD-10-CM

## 2024-12-31 DIAGNOSIS — Z79.01 LONG TERM (CURRENT) USE OF ANTICOAGULANTS: ICD-10-CM

## 2024-12-31 LAB
DEPRECATED RDW RBC AUTO: 44.4 FL (ref 35–45)
ERYTHROCYTE [DISTWIDTH] IN BLOOD BY AUTOMATED COUNT: 13.5 % (ref 11.5–14.5)
HCT VFR BLD AUTO: 42 % (ref 37–47)
HGB BLD-MCNC: 13.8 GM/DL (ref 12–16)
MCH RBC QN AUTO: 29.5 PG (ref 26–33)
MCHC RBC AUTO-ENTMCNC: 32.9 GM/DL (ref 32.2–35.5)
MCV RBC AUTO: 89.7 FL (ref 81–99)
PLATELET # BLD AUTO: 211 THOU/MM3 (ref 130–400)
PMV BLD AUTO: 10.6 FL (ref 9.4–12.4)
RBC # BLD AUTO: 4.68 MILL/MM3 (ref 4.2–5.4)
WBC # BLD AUTO: 9.9 THOU/MM3 (ref 4.8–10.8)

## 2024-12-31 PROCEDURE — 85027 COMPLETE CBC AUTOMATED: CPT

## 2024-12-31 PROCEDURE — 36415 COLL VENOUS BLD VENIPUNCTURE: CPT

## 2025-01-02 ENCOUNTER — TELEPHONE (OUTPATIENT)
Dept: FAMILY MEDICINE CLINIC | Age: 89
End: 2025-01-02

## 2025-01-02 NOTE — TELEPHONE ENCOUNTER
----- Message from LISA Conway CNP sent at 1/2/2025  8:21 AM EST -----  Let Emma know her CBC is normal.

## 2025-01-20 ENCOUNTER — OFFICE VISIT (OUTPATIENT)
Dept: FAMILY MEDICINE CLINIC | Age: 89
End: 2025-01-20
Payer: MEDICARE

## 2025-01-20 ENCOUNTER — HOSPITAL ENCOUNTER (OUTPATIENT)
Age: 89
Discharge: HOME | End: 2025-01-20
Payer: MEDICARE

## 2025-01-20 ENCOUNTER — TELEPHONE (OUTPATIENT)
Dept: FAMILY MEDICINE CLINIC | Age: 89
End: 2025-01-20

## 2025-01-20 VITALS
BODY MASS INDEX: 24.75 KG/M2 | SYSTOLIC BLOOD PRESSURE: 128 MMHG | HEART RATE: 77 BPM | WEIGHT: 154 LBS | TEMPERATURE: 97.1 F | RESPIRATION RATE: 16 BRPM | OXYGEN SATURATION: 99 % | HEIGHT: 66 IN | DIASTOLIC BLOOD PRESSURE: 76 MMHG

## 2025-01-20 DIAGNOSIS — N39.0 ACUTE URINARY TRACT INFECTION: ICD-10-CM

## 2025-01-20 DIAGNOSIS — K57.92 ACUTE DIVERTICULITIS: Primary | ICD-10-CM

## 2025-01-20 LAB
ANION GAP SERPL CALC-SCNC: 13 MEQ/L (ref 8–16)
BACTERIA: ABNORMAL
BASOPHILS ABSOLUTE: 0 THOU/MM3 (ref 0–0.1)
BASOPHILS NFR BLD AUTO: 0.4 %
BILIRUB UR QL STRIP: NEGATIVE
BILIRUBIN, URINE: NEGATIVE
BLOOD URINE, POC: ABNORMAL
BUN SERPL-MCNC: 16 MG/DL (ref 7–22)
CALCIUM SERPL-MCNC: 9.9 MG/DL (ref 8.5–10.5)
CASTS #/AREA URNS LPF: ABNORMAL /LPF
CASTS #/AREA URNS LPF: ABNORMAL /LPF
CHARACTER UR: ABNORMAL
CHARACTER, URINE: ABNORMAL
CHARCOAL URNS QL MICRO: ABNORMAL
CHLORIDE SERPL-SCNC: 98 MEQ/L (ref 98–111)
CO2 SERPL-SCNC: 27 MEQ/L (ref 23–33)
COLOR UR: YELLOW
COLOR, UA: YELLOW
CREAT SERPL-MCNC: 0.7 MG/DL (ref 0.4–1.2)
CRYSTALS URNS QL MICRO: ABNORMAL
DEPRECATED RDW RBC AUTO: 43.4 FL (ref 35–45)
EOSINOPHIL NFR BLD AUTO: 0.2 %
EOSINOPHILS ABSOLUTE: 0 THOU/MM3 (ref 0–0.4)
EPITHELIAL CELLS, UA: ABNORMAL /HPF
ERYTHROCYTE [DISTWIDTH] IN BLOOD BY AUTOMATED COUNT: 13.2 % (ref 11.5–14.5)
GFR SERPL CREATININE-BSD FRML MDRD: 82 ML/MIN/1.73M2
GLUCOSE SERPL-MCNC: 86 MG/DL (ref 70–108)
GLUCOSE UR QL STRIP.AUTO: NEGATIVE MG/DL
GLUCOSE URINE: NEGATIVE MG/DL
HCT VFR BLD AUTO: 44.1 % (ref 37–47)
HGB BLD-MCNC: 14.4 GM/DL (ref 12–16)
HGB UR QL STRIP.AUTO: ABNORMAL
IMM GRANULOCYTES # BLD AUTO: 0.04 THOU/MM3 (ref 0–0.07)
IMM GRANULOCYTES NFR BLD AUTO: 0.3 %
KETONES UR QL STRIP.AUTO: NEGATIVE
KETONES, URINE: NEGATIVE
LEUKOCYTE CLUMPS, URINE: NEGATIVE
LEUKOCYTE ESTERASE UR QL STRIP.AUTO: ABNORMAL
LYMPHOCYTES ABSOLUTE: 4.8 THOU/MM3 (ref 1–4.8)
LYMPHOCYTES NFR BLD AUTO: 38.7 %
MCH RBC QN AUTO: 29.4 PG (ref 26–33)
MCHC RBC AUTO-ENTMCNC: 32.7 GM/DL (ref 32.2–35.5)
MCV RBC AUTO: 90 FL (ref 81–99)
MONOCYTES ABSOLUTE: 1.2 THOU/MM3 (ref 0.4–1.3)
MONOCYTES NFR BLD AUTO: 10 %
NEUTROPHILS ABSOLUTE: 6.2 THOU/MM3 (ref 1.8–7.7)
NEUTROPHILS NFR BLD AUTO: 50.4 %
NITRITE UR QL STRIP.AUTO: NEGATIVE
NITRITE, URINE: NEGATIVE
NRBC BLD AUTO-RTO: 0 /100 WBC
PH UR STRIP.AUTO: 5.5 [PH] (ref 5–9)
PH, URINE: 5.5 (ref 5–9)
PLATELET # BLD AUTO: 243 THOU/MM3 (ref 130–400)
PMV BLD AUTO: 12.1 FL (ref 9.4–12.4)
POTASSIUM SERPL-SCNC: 3.9 MEQ/L (ref 3.5–5.2)
PROT UR STRIP.AUTO-MCNC: ABNORMAL MG/DL
PROTEIN, URINE: NEGATIVE MG/DL
RBC # BLD AUTO: 4.9 MILL/MM3 (ref 4.2–5.4)
RBC #/AREA URNS HPF: ABNORMAL /HPF
RENAL EPI CELLS #/AREA URNS HPF: ABNORMAL /[HPF]
SODIUM SERPL-SCNC: 138 MEQ/L (ref 135–145)
SPECIFIC GRAVITY UA: 1.02 (ref 1–1.03)
SPECIFIC GRAVITY UA: 1.02 (ref 1–1.03)
UROBILINOGEN, URINE: 0.2 EU/DL (ref 0–1)
UROBILINOGEN, URINE: 0.2 EU/DL (ref 0–1)
WBC # BLD AUTO: 12.3 THOU/MM3 (ref 4.8–10.8)
WBC #/AREA URNS HPF: ABNORMAL /HPF
YEAST LIKE FUNGI URNS QL MICRO: ABNORMAL

## 2025-01-20 PROCEDURE — 1123F ACP DISCUSS/DSCN MKR DOCD: CPT | Performed by: FAMILY MEDICINE

## 2025-01-20 PROCEDURE — 36415 COLL VENOUS BLD VENIPUNCTURE: CPT

## 2025-01-20 PROCEDURE — 85025 COMPLETE CBC W/AUTO DIFF WBC: CPT

## 2025-01-20 PROCEDURE — 99214 OFFICE O/P EST MOD 30 MIN: CPT | Performed by: FAMILY MEDICINE

## 2025-01-20 PROCEDURE — 1159F MED LIST DOCD IN RCRD: CPT | Performed by: FAMILY MEDICINE

## 2025-01-20 PROCEDURE — 1160F RVW MEDS BY RX/DR IN RCRD: CPT | Performed by: FAMILY MEDICINE

## 2025-01-20 PROCEDURE — 80048 BASIC METABOLIC PNL TOTAL CA: CPT

## 2025-01-20 SDOH — ECONOMIC STABILITY: FOOD INSECURITY: WITHIN THE PAST 12 MONTHS, THE FOOD YOU BOUGHT JUST DIDN'T LAST AND YOU DIDN'T HAVE MONEY TO GET MORE.: NEVER TRUE

## 2025-01-20 SDOH — ECONOMIC STABILITY: FOOD INSECURITY: WITHIN THE PAST 12 MONTHS, YOU WORRIED THAT YOUR FOOD WOULD RUN OUT BEFORE YOU GOT MONEY TO BUY MORE.: NEVER TRUE

## 2025-01-20 ASSESSMENT — PATIENT HEALTH QUESTIONNAIRE - PHQ9
SUM OF ALL RESPONSES TO PHQ QUESTIONS 1-9: 0
1. LITTLE INTEREST OR PLEASURE IN DOING THINGS: NOT AT ALL
SUM OF ALL RESPONSES TO PHQ QUESTIONS 1-9: 0
2. FEELING DOWN, DEPRESSED OR HOPELESS: NOT AT ALL
SUM OF ALL RESPONSES TO PHQ9 QUESTIONS 1 & 2: 0

## 2025-01-20 NOTE — PROGRESS NOTES
Chief Complaint   Patient presents with    Abdominal Pain     History obtained from the patient.    SUBJECTIVE:  Emma Mesa is a 89 y.o. female that presents today for    -Abdominal Pain    HPI:    Started 2 wks ago  Was initially having some dysuria, darker urine and urinary frequency  Dysuria better  Frequency the same  Also now having LLQ abd pain the last wk or so as well  Mildly worse over time  No fevers  Dec appetite  Nausea  No emesis  No diarrhea  Some constipation  Pain is 4/10, achy and does not radiate     Change in pain with eating?  No  Change in pain with BM?  No  Nausea? yes  Vomiting? no  Diarrhea? no  Constipation? yes  Blood in Stools? no      Current Outpatient Medications   Medication Sig Dispense Refill    amoxicillin-clavulanate (AUGMENTIN) 875-125 MG per tablet Take 1 tablet by mouth 2 times daily for 10 days 20 tablet 0    betamethasone valerate (VALISONE) 0.1 % cream Apply topically 2 times daily 45 g 3    NONFORMULARY Take 1 tablet by mouth at bedtime Magnesium laxative.  Sometimes takes 1 in A.M.      warfarin (JANTOVEN) 2 MG tablet use as directed by [facility] coumadin clinic 180 tabs = 90 days 180 tablet 3    atorvastatin (LIPITOR) 20 MG tablet Take 1 tablet by mouth every evening Indications: Blood Cholesterol Abnormal 90 tablet 4    levothyroxine (SYNTHROID) 125 MCG tablet Take 1 tablet by mouth Daily Indications: Impaired Thyroid Function Pt wants only synthroid brand name  Takes MTWT only. 5-14-18. 90 tablet 4    dilTIAZem (DILACOR XR) 180 MG extended release capsule Take 2 capsules by mouth daily Indications: High Blood Pressure Disorder 180 capsule 4    lidocaine (XYLOCAINE) 5 % ointment Apply topically every 4 hours as needed for Pain (To painful areas on low back and right leg) Apply topically as needed. 50 g 2    D-MANNOSE PO Take 1 tablet by mouth in the morning and at bedtime      Handicap Placard MISC by Does not apply route Duration: 3 years  Physical

## 2025-01-21 ENCOUNTER — TELEPHONE (OUTPATIENT)
Dept: FAMILY MEDICINE CLINIC | Age: 89
End: 2025-01-21

## 2025-01-21 NOTE — TELEPHONE ENCOUNTER
----- Message from Dr. Jelani Schwartz, DO sent at 1/21/2025  7:04 AM EST -----  Please let pt know that labs overall look good  Does have a mild elevated WBC cnt, c/w infection we're treating  Rest of labs are WNL and reassuring  Con't with plan from office  Let me know if questions, thanks!

## 2025-01-23 ENCOUNTER — TELEPHONE (OUTPATIENT)
Dept: FAMILY MEDICINE CLINIC | Age: 89
End: 2025-01-23

## 2025-01-23 ENCOUNTER — ANTI-COAG VISIT (OUTPATIENT)
Age: 89
End: 2025-01-23
Payer: MEDICARE

## 2025-01-23 DIAGNOSIS — Z51.81 ENCOUNTER FOR THERAPEUTIC DRUG MONITORING: ICD-10-CM

## 2025-01-23 DIAGNOSIS — Z79.01 ANTICOAGULATED ON COUMADIN: Primary | ICD-10-CM

## 2025-01-23 LAB
BACTERIA UR CULT: ABNORMAL
ORGANISM: ABNORMAL
POC INR: 3.7 (ref 0.8–1.2)

## 2025-01-23 PROCEDURE — 99212 OFFICE O/P EST SF 10 MIN: CPT

## 2025-01-23 PROCEDURE — 85610 PROTHROMBIN TIME: CPT

## 2025-01-23 NOTE — PROGRESS NOTES
Medication Management Clinic  [facility]  Anticoagulation Clinic  [phone] (phone)  [phone] (fax)    Ms. Emma Mesa is a 89 y.o.  female with history of PE who presents today for anticoagulation monitoring and adjustment.    Patient verifies current dosing regimen and tablet strength.  No missed or extra doses.  Patient denies s/s bleeding/bruising/swelling/SOB  No blood in urine or stool.  She is on Augmentin for 10 days since 1/20/25 for diverticulitis  Decreased activity and decreased diet the past couple weeks due to not feeling well  No change in alcohol use or tobacco use.  Patient denies falls.  No Procedures scheduled in the future at this time.    Assessment:   Lab Results   Component Value Date    INR 3.70 (H) 01/23/2025    INR 1.90 (H) 12/12/2024    INR 2.40 (H) 10/31/2024     INR supratherapeutic   Recent Labs     01/23/25  1415   INR 3.70*       Plan:  HOLD Coumadin today then continue Coumadin 4 mg daily.  Recheck INR in 1 week.  Patient reminded to call the Anticoagulation Clinic with any signs or symptoms of bleeding or with any medication changes.  Patient given instructions utilizing the teach back method.    After visit summary printed and reviewed with patient.      Discharged ambulatory in no apparent distress with daughter.    For Pharmacy Admin Tracking Only    Intervention Detail: Dose Adjustment: 1, reason: Therapy Optimization  Total # of Interventions Recommended: 1  Total # of Interventions Accepted: 1  Time Spent (min): 20

## 2025-01-23 NOTE — TELEPHONE ENCOUNTER
----- Message from Dr. Jelani Schwartz, DO sent at 1/23/2025  1:45 PM EST -----  Please reach out to pt and daughter and see how she's feeling today.   Let me know, thanks!

## 2025-01-24 NOTE — TELEPHONE ENCOUNTER
Ok  Con't current meds  F/u coumadin clinic  Will reach out to her again on Monday  Let me know if questions, thanks!    Future Appointments   Date Time Provider Department Center   1/30/2025  2:20 PM Amparo Vázquez RP STR MED [facility] [place]   4/1/2025  3:20 PM Kumar Mauricio APRN - CNP Fam Med [facility]

## 2025-01-24 NOTE — TELEPHONE ENCOUNTER
Pt informed and voiced understanding.     Are you still wanting Daughter to be informed?   Waiting on a call back from daughter

## 2025-01-24 NOTE — TELEPHONE ENCOUNTER
Pt states \" I feel better. Not good but better. Im still constipated and can't go. I'm still very tired and weak. I went to the clinic yesterday and my blood was all out of wack. It was 3.70 they were a little concerned about that but I'm okay\"       Left message on machine for daughter to call back

## 2025-01-27 ENCOUNTER — TELEPHONE (OUTPATIENT)
Dept: FAMILY MEDICINE CLINIC | Age: 89
End: 2025-01-27

## 2025-01-27 NOTE — TELEPHONE ENCOUNTER
----- Message from Dr. Jelani Schwartz, DO sent at 1/27/2025  7:23 AM EST -----  Please call pt and daughter and see how pt doing today  Let me know. Thanks!

## 2025-01-27 NOTE — TELEPHONE ENCOUNTER
Left message on answering machine. Requested pt to call back at [phone], at their earliest convenience.     Okay Per HIPAA

## 2025-01-30 ENCOUNTER — ANTI-COAG VISIT (OUTPATIENT)
Age: 89
End: 2025-01-30
Payer: MEDICARE

## 2025-01-30 DIAGNOSIS — Z79.01 ANTICOAGULATED ON COUMADIN: Primary | ICD-10-CM

## 2025-01-30 DIAGNOSIS — Z51.81 ENCOUNTER FOR THERAPEUTIC DRUG MONITORING: ICD-10-CM

## 2025-01-30 LAB — POC INR: 2.4 (ref 0.8–1.2)

## 2025-01-30 PROCEDURE — 85610 PROTHROMBIN TIME: CPT

## 2025-01-30 PROCEDURE — 99211 OFF/OP EST MAY X REQ PHY/QHP: CPT

## 2025-01-30 NOTE — PROGRESS NOTES
Medication Management Clinic  [facility]  Anticoagulation Clinic  [phone] (phone)  [phone] (fax)    Ms. Emma Mesa is a 89 y.o.  female with history of PE who presents today for anticoagulation monitoring and adjustment (1 week visit).    Patient verifies current dosing regimen and tablet strength.  No missed or extra doses.  Patient denies s/s bleeding/bruising/swelling/SOB  No blood in urine or stool.  No dietary changes.  Will finish 10 day course of Augmentin 1/31/25  No change in alcohol use or tobacco use.  Patient denies falls.  Patient reports overall improvement in health and activity following questionable colitis / UTI treated with antibiotics, however she is still endorsing some fatigue.   No Procedures scheduled in the future at this time.    Assessment:   Lab Results   Component Value Date    INR 2.40 (H) 01/30/2025    INR 3.70 (H) 01/23/2025    INR 1.90 (H) 12/12/2024     INR therapeutic   Recent Labs     01/30/25  1421   INR 2.40*        Plan:  Continue Coumadin 4 mg every day.  Recheck INR in 5 week(s). With patient's INR back in range on a stable dose and return to normal activity, reasonable to resume 5 week INR checks. Patient reminded to call the Anticoagulation Clinic with any signs or symptoms of bleeding or with any medication changes.  Patient given instructions utilizing the teach back method.       After visit summary printed and reviewed with patient.      Discharged ambulatory in no apparent distress.    For Pharmacy Admin Tracking Only    Time Spent (min): 20    Tawnya Cottrell PharmD  1/30/2025 at 2:52 PM

## 2025-03-04 ENCOUNTER — ANTI-COAG VISIT (OUTPATIENT)
Age: 89
End: 2025-03-04
Payer: MEDICARE

## 2025-03-04 DIAGNOSIS — Z95.828 PRESENCE OF IVC FILTER: ICD-10-CM

## 2025-03-04 DIAGNOSIS — Z51.81 ENCOUNTER FOR THERAPEUTIC DRUG MONITORING: ICD-10-CM

## 2025-03-04 DIAGNOSIS — Z79.01 ANTICOAGULATED ON COUMADIN: Primary | ICD-10-CM

## 2025-03-04 LAB — POC INR: 2.6 (ref 0.8–1.2)

## 2025-03-04 PROCEDURE — 85610 PROTHROMBIN TIME: CPT | Performed by: PHARMACIST

## 2025-03-04 PROCEDURE — 99212 OFFICE O/P EST SF 10 MIN: CPT | Performed by: PHARMACIST

## 2025-03-04 RX ORDER — WARFARIN SODIUM 2 MG/1
TABLET ORAL
Qty: 180 TABLET | Refills: 3 | Status: SHIPPED | OUTPATIENT
Start: 2025-03-04

## 2025-03-04 NOTE — PROGRESS NOTES
Medication Management Clinic  [facility]  Anticoagulation Clinic  [phone] (phone)  [phone] (fax)    Ms. Emma Mesa is a 89 y.o.  female with history of PE who presents today for anticoagulation monitoring and adjustment.    Patient verifies current dosing regimen and tablet strength.  No missed or extra doses.  Patient denies s/s bleeding/bruising/swelling/SOB  No blood in urine or stool.  No dietary changes.  No changes in medication/OTC agents/Herbals.  No change in alcohol use or tobacco use.  No change in activity level.  Patient denies falls.  No vomiting/diarrhea or acute illness.   No Procedures scheduled in the future at this time.    Assessment:   Lab Results   Component Value Date    INR 2.60 (H) 03/04/2025    INR 2.40 (H) 01/30/2025    INR 3.70 (H) 01/23/2025     INR therapeutic   Recent Labs     03/04/25  1404   INR 2.60*         Plan:  Continue Coumadin 4 mg daily.  Recheck INR in 6 week(s).  Patient reminded to call the Anticoagulation Clinic with any signs or symptoms of bleeding or with any medication changes.  Patient given instructions utilizing the teach back method.      Warfarin Refill sent per referring provider    After visit summary printed and reviewed with patient.      Discharged ambulatory in no apparent distress.        For Pharmacy Admin Tracking Only    Time Spent (min): 20    Bridgette Moreno, MariumD, [facility]  3/4/2025  2:12 PM

## 2025-03-06 ENCOUNTER — APPOINTMENT (OUTPATIENT)
Age: 89
End: 2025-03-06
Payer: MEDICARE

## 2025-03-16 ENCOUNTER — APPOINTMENT (OUTPATIENT)
Dept: GENERAL RADIOLOGY | Age: 89
End: 2025-03-16
Payer: MEDICARE

## 2025-03-16 ENCOUNTER — HOSPITAL ENCOUNTER (EMERGENCY)
Age: 89
Discharge: HOME | End: 2025-03-16
Attending: EMERGENCY MEDICINE
Payer: MEDICARE

## 2025-03-16 VITALS
OXYGEN SATURATION: 92 % | BODY MASS INDEX: 24.22 KG/M2 | RESPIRATION RATE: 23 BRPM | HEART RATE: 74 BPM | SYSTOLIC BLOOD PRESSURE: 141 MMHG | DIASTOLIC BLOOD PRESSURE: 68 MMHG | WEIGHT: 150 LBS | TEMPERATURE: 97.5 F

## 2025-03-16 DIAGNOSIS — M79.602 LEFT ARM PAIN: Primary | ICD-10-CM

## 2025-03-16 DIAGNOSIS — G56.02 CARPAL TUNNEL SYNDROME OF LEFT WRIST: ICD-10-CM

## 2025-03-16 LAB
ALBUMIN SERPL BCG-MCNC: 4.3 G/DL (ref 3.4–4.9)
ALP SERPL-CCNC: 107 U/L (ref 35–104)
ALT SERPL W/O P-5'-P-CCNC: 15 U/L (ref 10–35)
ANION GAP SERPL CALC-SCNC: 13 MEQ/L (ref 8–16)
APTT PPP: 45.6 SECONDS (ref 22–38)
AST SERPL-CCNC: 27 U/L (ref 10–35)
BASOPHILS ABSOLUTE: 0 THOU/MM3 (ref 0–0.1)
BASOPHILS NFR BLD AUTO: 0.3 %
BILIRUB SERPL-MCNC: 0.8 MG/DL (ref 0.3–1.2)
BUN SERPL-MCNC: 14 MG/DL (ref 8–23)
CALCIUM SERPL-MCNC: 9.5 MG/DL (ref 8.2–9.6)
CHLORIDE SERPL-SCNC: 99 MEQ/L (ref 98–111)
CO2 SERPL-SCNC: 29 MEQ/L (ref 22–29)
CREAT SERPL-MCNC: 0.8 MG/DL (ref 0.5–0.9)
D DIMER PPP IA.FEU-MCNC: 467 NG/ML FEU (ref 0–500)
DEPRECATED RDW RBC AUTO: 41.9 FL (ref 35–45)
EKG ATRIAL RATE: 88 BPM
EKG P AXIS: 71 DEGREES
EKG P-R INTERVAL: 160 MS
EKG Q-T INTERVAL: 356 MS
EKG QRS DURATION: 76 MS
EKG QTC CALCULATION (BAZETT): 430 MS
EKG R AXIS: 12 DEGREES
EKG T AXIS: 20 DEGREES
EKG VENTRICULAR RATE: 88 BPM
EOSINOPHIL NFR BLD AUTO: 0.4 %
EOSINOPHILS ABSOLUTE: 0.1 THOU/MM3 (ref 0–0.4)
ERYTHROCYTE [DISTWIDTH] IN BLOOD BY AUTOMATED COUNT: 13.1 % (ref 11.5–14.5)
GFR SERPL CREATININE-BSD FRML MDRD: 69 ML/MIN/1.73M2
GLUCOSE SERPL-MCNC: 144 MG/DL (ref 74–109)
HCT VFR BLD AUTO: 45.4 % (ref 37–47)
HGB BLD-MCNC: 14.7 GM/DL (ref 12–16)
IMM GRANULOCYTES # BLD AUTO: 0.03 THOU/MM3 (ref 0–0.07)
IMM GRANULOCYTES NFR BLD AUTO: 0.2 %
INR PPP: 3.18 (ref 0.85–1.13)
LYMPHOCYTES ABSOLUTE: 4.7 THOU/MM3 (ref 1–4.8)
LYMPHOCYTES NFR BLD AUTO: 36 %
MCH RBC QN AUTO: 28.4 PG (ref 26–33)
MCHC RBC AUTO-ENTMCNC: 32.4 GM/DL (ref 32.2–35.5)
MCV RBC AUTO: 87.8 FL (ref 81–99)
MONOCYTES ABSOLUTE: 1 THOU/MM3 (ref 0.4–1.3)
MONOCYTES NFR BLD AUTO: 7.5 %
NEUTROPHILS ABSOLUTE: 7.2 THOU/MM3 (ref 1.8–7.7)
NEUTROPHILS NFR BLD AUTO: 55.6 %
NRBC BLD AUTO-RTO: 0 /100 WBC
NT-PROBNP SERPL IA-MCNC: 222 PG/ML (ref 0–449)
OSMOLALITY SERPL CALC.SUM OF ELEC: 284.3 MOSMOL/KG (ref 275–300)
PLATELET # BLD AUTO: 226 THOU/MM3 (ref 130–400)
PMV BLD AUTO: 11.1 FL (ref 9.4–12.4)
POTASSIUM SERPL-SCNC: 3.7 MEQ/L (ref 3.5–5.2)
PROT SERPL-MCNC: 7.7 G/DL (ref 6.4–8.3)
RBC # BLD AUTO: 5.17 MILL/MM3 (ref 4.2–5.4)
SODIUM SERPL-SCNC: 141 MEQ/L (ref 135–145)
TROPONIN, HIGH SENSITIVITY: 10 NG/L (ref 0–12)
WBC # BLD AUTO: 13 THOU/MM3 (ref 4.8–10.8)

## 2025-03-16 PROCEDURE — 85610 PROTHROMBIN TIME: CPT

## 2025-03-16 PROCEDURE — 73060 X-RAY EXAM OF HUMERUS: CPT

## 2025-03-16 PROCEDURE — 93005 ELECTROCARDIOGRAM TRACING: CPT | Performed by: EMERGENCY MEDICINE

## 2025-03-16 PROCEDURE — 36415 COLL VENOUS BLD VENIPUNCTURE: CPT

## 2025-03-16 PROCEDURE — 99285 EMERGENCY DEPT VISIT HI MDM: CPT

## 2025-03-16 PROCEDURE — 6370000000 HC RX 637 (ALT 250 FOR IP)

## 2025-03-16 PROCEDURE — 85379 FIBRIN DEGRADATION QUANT: CPT

## 2025-03-16 PROCEDURE — 84484 ASSAY OF TROPONIN QUANT: CPT

## 2025-03-16 PROCEDURE — 83880 ASSAY OF NATRIURETIC PEPTIDE: CPT

## 2025-03-16 PROCEDURE — 80053 COMPREHEN METABOLIC PANEL: CPT

## 2025-03-16 PROCEDURE — 85025 COMPLETE CBC W/AUTO DIFF WBC: CPT

## 2025-03-16 PROCEDURE — 71045 X-RAY EXAM CHEST 1 VIEW: CPT

## 2025-03-16 PROCEDURE — 85730 THROMBOPLASTIN TIME PARTIAL: CPT

## 2025-03-16 RX ORDER — KETOROLAC TROMETHAMINE 30 MG/ML
15 INJECTION, SOLUTION INTRAMUSCULAR; INTRAVENOUS ONCE
Status: DISCONTINUED | OUTPATIENT
Start: 2025-03-16 | End: 2025-03-16

## 2025-03-16 RX ORDER — ACETAMINOPHEN 325 MG/1
650 TABLET ORAL ONCE
Status: COMPLETED | OUTPATIENT
Start: 2025-03-16 | End: 2025-03-16

## 2025-03-16 RX ADMIN — ACETAMINOPHEN 650 MG: 325 TABLET ORAL at 16:18

## 2025-03-16 ASSESSMENT — PAIN DESCRIPTION - LOCATION: LOCATION: ARM

## 2025-03-16 ASSESSMENT — PAIN DESCRIPTION - ORIENTATION: ORIENTATION: LEFT

## 2025-03-16 ASSESSMENT — PAIN SCALES - GENERAL: PAINLEVEL_OUTOF10: 5

## 2025-03-16 NOTE — ED NOTES
Patient presents to the Emergency Department via self with daughter. Patient presents with a complaint of left arm pain. Patient states that pain started yesterday described as a throbbing w/ pain upon exertion. Pt also states fingers are intermittently numb over the past two days. Chronic SOB noted per patient. EKG obtained and IV inserted. Patient is alert and oriented x4, patient does not appear in acute distress upon triage. Patient respirations are regular and unlabored with even rise and fall of chest. Patient family at the bedside. Call light within reach.

## 2025-03-16 NOTE — ED NOTES
Pt resting in bed at this time. Pt complaining of arm pain at this time. Pt states she is now having pain at rest. Call light in reach.

## 2025-03-16 NOTE — DISCHARGE INSTRUCTIONS
You were evaluated for left arm pain today. Xray of your arm didn't show any fractures. Cardiac workup was unremarkable. Your pain is likely musculoskeletal or from carpel tunnel syndrome. Take 2tabs of tylenol 650 mg Q8hrs for the pain. Please follow up with your PCP   Your INR was above expected goal 3.1. Hold medication if you have any bleeding and follow up with warfarin clinic for dose adjustment

## 2025-03-16 NOTE — ED PROVIDER NOTES
[facility]  EMERGENCY DEPARTMENT ENCOUNTER          Pt Name: Emma Mesa  MRN: [id number]  Birthdate 3/1/1934  Date of evaluation: 3/16/2025  Treating Resident Physician: Sonia Lopez MD  Supervising Physician: Ismael Rider    History obtained from the patient.    CHIEF COMPLAINT       Chief Complaint   Patient presents with    Arm Pain           HISTORY OF PRESENT ILLNESS    HPI  Emma Mesa is a 89 y.o. female PMH HTN,DVT, thyroid disease, Hx PE and clotting disorder. Osteoporosis, Hx compression fracture , hyperlipidemia who presents to the emergency department for evaluation of arm pain.She has been having severe left arm pain that started last night, has been constant. No h/o trauma, fall or lifting heavy objects. She tried a heating pad, tylenol and exercises with no improvement. Arm radiates down the inner side of her arm to her fingers, is associated with numbness in her lateral 3 fingers. Denies any other joint pain. Says she is also having some chest tightness but contributes this to feeling anxious. She has baseline sob, no change. She is using her right arm to move her left arm, limited movement due to pain      The patient has no other acute complaints at this time.     PAST MEDICAL AND SURGICAL HISTORY     Past Medical History:   Diagnosis Date    Arthritis     wrist, back, neck, foot    Blood circulation, collateral     Breathing difficulty     Chronic kidney disease     DVT (deep venous thrombosis) (HCC)     History of blood transfusion 1960's    child birth    Hx of blood clots 2013???    left leg    Hyperlipidemia     Hypertension     Lumbar radiculitis 10/30/2023    Pneumonia     Prolonged emergence from general anesthesia     with hysterectomy??    Thyroid disease     Unspecified diseases of blood and blood-forming organs      Past Surgical History:   Procedure Laterality Date    CATARACT REMOVAL Right 12/03/2020    CATARACT REMOVAL Left 12/09/2020

## 2025-03-17 ENCOUNTER — TELEPHONE (OUTPATIENT)
Age: 89
End: 2025-03-17

## 2025-03-17 ENCOUNTER — TELEPHONE (OUTPATIENT)
Dept: FAMILY MEDICINE CLINIC | Age: 89
End: 2025-03-17

## 2025-03-17 NOTE — TELEPHONE ENCOUNTER
Confirmed with patient that she did not take Coumadin yesterday 3/16/25 (4 mg) per ED instructions for INR 3.18. Instructed patient to resume home regimen tonight of 4 mg daily with next INR 4/15/25 @ 2 PM as originally planned. Patient voiced understanding. Patient given instructions utilizing the teach back method.    For Pharmacy Admin Tracking Only    Intervention Detail: Dose Adjustment: 1, reason: Therapy Optimization  Total # of Interventions Recommended: 1  Total # of Interventions Accepted: 1  Time Spent (min): 15    Emmanuel Wood, MariumD, BCPS  3/17/2025  9:46 AM

## 2025-03-17 NOTE — TELEPHONE ENCOUNTER
Patient called and states that she was in the ER yesterday and her INR was 3.1. They told her not to take Coumadin last night.  Please call the patient and give instructions for her Coumadin.

## 2025-03-18 NOTE — TELEPHONE ENCOUNTER
Spoke with patient and she denied the need to be seen following ER visit with provider before her next appt.

## 2025-03-24 ENCOUNTER — TELEPHONE (OUTPATIENT)
Dept: FAMILY MEDICINE CLINIC | Age: 89
End: 2025-03-24

## 2025-03-24 NOTE — TELEPHONE ENCOUNTER
----- Message from Dinorah Evans sent at 3/24/2025 11:12 AM EDT -----  Please renew annual referral for Anticoagulation Monitoring, #111.  Thanks, MIGUEL Evans RN BSN  Coumadin Clinic

## 2025-03-25 ENCOUNTER — TELEPHONE (OUTPATIENT)
Dept: FAMILY MEDICINE CLINIC | Age: 89
End: 2025-03-25

## 2025-03-25 DIAGNOSIS — I27.82 OTHER CHRONIC PULMONARY EMBOLISM WITHOUT ACUTE COR PULMONALE: Primary | ICD-10-CM

## 2025-03-25 DIAGNOSIS — Z51.81 ENCOUNTER FOR THERAPEUTIC DRUG MONITORING: ICD-10-CM

## 2025-03-25 DIAGNOSIS — Z79.01 LONG TERM (CURRENT) USE OF ANTICOAGULANTS: ICD-10-CM

## 2025-04-01 ENCOUNTER — TELEPHONE (OUTPATIENT)
Dept: FAMILY MEDICINE CLINIC | Age: 89
End: 2025-04-01

## 2025-04-01 ENCOUNTER — OFFICE VISIT (OUTPATIENT)
Dept: FAMILY MEDICINE CLINIC | Age: 89
End: 2025-04-01
Payer: MEDICARE

## 2025-04-01 VITALS
DIASTOLIC BLOOD PRESSURE: 78 MMHG | OXYGEN SATURATION: 97 % | RESPIRATION RATE: 16 BRPM | WEIGHT: 153 LBS | BODY MASS INDEX: 24.59 KG/M2 | HEART RATE: 69 BPM | SYSTOLIC BLOOD PRESSURE: 134 MMHG | TEMPERATURE: 98.3 F | HEIGHT: 66 IN

## 2025-04-01 DIAGNOSIS — I10 ESSENTIAL HYPERTENSION: ICD-10-CM

## 2025-04-01 DIAGNOSIS — Z79.01 LONG TERM (CURRENT) USE OF ANTICOAGULANTS: ICD-10-CM

## 2025-04-01 DIAGNOSIS — E03.9 HYPOTHYROIDISM, UNSPECIFIED TYPE: Primary | ICD-10-CM

## 2025-04-01 DIAGNOSIS — E03.9 HYPOTHYROIDISM, UNSPECIFIED TYPE: ICD-10-CM

## 2025-04-01 DIAGNOSIS — Z00.01 ENCOUNTER FOR MEDICARE ANNUAL EXAMINATION WITH ABNORMAL FINDINGS: Primary | ICD-10-CM

## 2025-04-01 DIAGNOSIS — Z79.01 ANTICOAGULATED ON COUMADIN: ICD-10-CM

## 2025-04-01 DIAGNOSIS — Z86.711 HISTORY OF PULMONARY EMBOLISM: ICD-10-CM

## 2025-04-01 DIAGNOSIS — E07.9 THYROID DISEASE: ICD-10-CM

## 2025-04-01 DIAGNOSIS — R26.89 BALANCE PROBLEM: ICD-10-CM

## 2025-04-01 DIAGNOSIS — I10 PRIMARY HYPERTENSION: ICD-10-CM

## 2025-04-01 DIAGNOSIS — M81.0 AGE-RELATED OSTEOPOROSIS WITHOUT CURRENT PATHOLOGICAL FRACTURE: ICD-10-CM

## 2025-04-01 PROBLEM — M54.9 INTRACTABLE BACK PAIN: Status: RESOLVED | Noted: 2023-10-26 | Resolved: 2025-04-01

## 2025-04-01 PROCEDURE — 1159F MED LIST DOCD IN RCRD: CPT | Performed by: NURSE PRACTITIONER

## 2025-04-01 PROCEDURE — 1123F ACP DISCUSS/DSCN MKR DOCD: CPT | Performed by: NURSE PRACTITIONER

## 2025-04-01 PROCEDURE — G0439 PPPS, SUBSEQ VISIT: HCPCS | Performed by: NURSE PRACTITIONER

## 2025-04-01 PROCEDURE — 1160F RVW MEDS BY RX/DR IN RCRD: CPT | Performed by: NURSE PRACTITIONER

## 2025-04-01 PROCEDURE — 99214 OFFICE O/P EST MOD 30 MIN: CPT | Performed by: NURSE PRACTITIONER

## 2025-04-01 RX ORDER — DILTIAZEM HYDROCHLORIDE 180 MG/1
360 CAPSULE, EXTENDED RELEASE ORAL DAILY
Qty: 180 CAPSULE | Refills: 4 | Status: SHIPPED | OUTPATIENT
Start: 2025-04-01

## 2025-04-01 RX ORDER — LEVOTHYROXINE SODIUM 125 UG/1
125 TABLET ORAL DAILY
Qty: 90 TABLET | Refills: 4 | Status: SHIPPED | OUTPATIENT
Start: 2025-04-01 | End: 2025-04-01

## 2025-04-01 RX ORDER — ATORVASTATIN CALCIUM 20 MG/1
20 TABLET, FILM COATED ORAL EVERY EVENING
Qty: 90 TABLET | Refills: 4 | Status: SHIPPED | OUTPATIENT
Start: 2025-04-01

## 2025-04-01 RX ORDER — LEVOTHYROXINE SODIUM 125 MCG
125 TABLET ORAL DAILY
Qty: 90 TABLET | Refills: 4 | Status: SHIPPED | OUTPATIENT
Start: 2025-04-01

## 2025-04-01 ASSESSMENT — PATIENT HEALTH QUESTIONNAIRE - PHQ9
SUM OF ALL RESPONSES TO PHQ QUESTIONS 1-9: 0
SUM OF ALL RESPONSES TO PHQ QUESTIONS 1-9: 0
1. LITTLE INTEREST OR PLEASURE IN DOING THINGS: NOT AT ALL
SUM OF ALL RESPONSES TO PHQ QUESTIONS 1-9: 0
2. FEELING DOWN, DEPRESSED OR HOPELESS: NOT AT ALL
SUM OF ALL RESPONSES TO PHQ QUESTIONS 1-9: 0

## 2025-04-01 ASSESSMENT — VISUAL ACUITY
OS_CC: 20/50
OD_CC: 20/40

## 2025-04-01 NOTE — TELEPHONE ENCOUNTER
Michelle from [facility] pharmacy called and said the the prescribed drug has to be   \" Synthroid \" and then can put VALENTINE if the pt wants the synthroid brand

## 2025-04-04 ENCOUNTER — HOSPITAL ENCOUNTER (OUTPATIENT)
Age: 89
Discharge: HOME | End: 2025-04-04
Payer: MEDICARE

## 2025-04-04 DIAGNOSIS — E03.9 HYPOTHYROIDISM, UNSPECIFIED TYPE: ICD-10-CM

## 2025-04-04 DIAGNOSIS — M81.0 AGE-RELATED OSTEOPOROSIS WITHOUT CURRENT PATHOLOGICAL FRACTURE: ICD-10-CM

## 2025-04-04 DIAGNOSIS — I10 PRIMARY HYPERTENSION: ICD-10-CM

## 2025-04-04 DIAGNOSIS — I10 ESSENTIAL HYPERTENSION: ICD-10-CM

## 2025-04-04 LAB
25(OH)D3 SERPL-MCNC: 45 NG/ML (ref 30–100)
CHOLEST SERPL-MCNC: 162 MG/DL (ref 100–199)
HDLC SERPL-MCNC: 68 MG/DL
LDLC SERPL CALC-MCNC: 78 MG/DL
TRIGL SERPL-MCNC: 79 MG/DL (ref 0–199)
TSH SERPL DL<=0.05 MIU/L-ACNC: 1.49 UIU/ML (ref 0.27–4.2)

## 2025-04-04 PROCEDURE — 80061 LIPID PANEL: CPT

## 2025-04-04 PROCEDURE — 36415 COLL VENOUS BLD VENIPUNCTURE: CPT

## 2025-04-04 PROCEDURE — 82306 VITAMIN D 25 HYDROXY: CPT

## 2025-04-04 PROCEDURE — 84443 ASSAY THYROID STIM HORMONE: CPT

## 2025-04-07 ENCOUNTER — TELEPHONE (OUTPATIENT)
Dept: FAMILY MEDICINE CLINIC | Age: 89
End: 2025-04-07

## 2025-04-07 ENCOUNTER — RESULTS FOLLOW-UP (OUTPATIENT)
Dept: FAMILY MEDICINE CLINIC | Age: 89
End: 2025-04-07

## 2025-04-07 NOTE — TELEPHONE ENCOUNTER
4/7/25  1:35 PM  Result Note  Let pt know labs WNL continue current meds  Vitamin D 25 Hydroxy; Lipid Panel; TSH reflex to FT4

## 2025-04-15 ENCOUNTER — ANTI-COAG VISIT (OUTPATIENT)
Age: 89
End: 2025-04-15
Payer: MEDICARE

## 2025-04-15 DIAGNOSIS — Z79.01 ANTICOAGULATED ON COUMADIN: Primary | ICD-10-CM

## 2025-04-15 DIAGNOSIS — Z79.01 LONG TERM (CURRENT) USE OF ANTICOAGULANTS: ICD-10-CM

## 2025-04-15 DIAGNOSIS — Z51.81 ENCOUNTER FOR THERAPEUTIC DRUG MONITORING: ICD-10-CM

## 2025-04-15 LAB — POC INR: 3 (ref 0.8–1.2)

## 2025-04-15 PROCEDURE — 85610 PROTHROMBIN TIME: CPT

## 2025-04-15 PROCEDURE — 99211 OFF/OP EST MAY X REQ PHY/QHP: CPT

## 2025-04-15 NOTE — PROGRESS NOTES
Medication Management Clinic  [facility]  Anticoagulation Clinic  [phone] (phone)  [phone] (fax)    Ms. Emma Mesa is a 89 y.o.  female with history of PE/long-term use of anticoagulants, per referral from BENNIE Rain, who presents today for Warfarin monitoring and adjustment (6 week visit).    Patient verifies current dosing regimen and tablet strength.  No extra doses.  Patient denies SOB.  Has usual swelling of legs/ankles - wears compression socks bilat. Noted nearly chico-sized reddish-purple bruise below left eye, with smaller one near it.  She's states they aren't bruises - has been to doctor.  States she's been getting them \"all over\" for past 6 months. Has had blood work.  States she gets tiny ones on legs that sometimes bleed.  Revealed large RFA \"bruise\" - starting to fade from reddish-purple.  Denies trauma.  No blood in urine or stool.  No dietary changes.  No changes in medication/OTC agents/herbals.  No change in alcohol use or tobacco use.  No change in activity level.  Patient denies falls.  No vomiting/diarrhea or acute illness.   No procedures scheduled in the future at this time.  Went to ER 3/16 for left arm pain/left carpal tunnel syndrome - received Tylenol there.  INR 3.18 - provider instructed patient to skip Coumadin that day.  Patient notified this clinic next day.  No new orders.      Assessment:       INR goal: 2.0-3.0  Lab Results   Component Value Date    INR 3.00 (H) 04/15/2025    INR 3.18 (H) 03/16/2025    INR 2.60 (H) 03/04/2025     INR therapeutic   Recent Labs     04/15/25  1402   INR 3.00*      Plan:  POCT INR performed/result reviewed.  Decrease PO Coumadin to 3 mg T, 4 mg MWThFSS (from 4 mg daily=3.6% decrease).  Recheck INR in 2-2.5 week(s). (Report given - orders entered by MIGUEL Vázquez RP., PharmD.)  Patient reminded to call the Anticoagulation Clinic with any signs or symptoms of bleeding or with any medication changes.  Patient given

## 2025-04-22 ENCOUNTER — HOSPITAL ENCOUNTER (OUTPATIENT)
Dept: MRI IMAGING | Age: 89
Discharge: HOME | End: 2025-04-22
Payer: MEDICARE

## 2025-04-22 DIAGNOSIS — R26.89 BALANCE PROBLEM: ICD-10-CM

## 2025-04-22 PROCEDURE — 70553 MRI BRAIN STEM W/O & W/DYE: CPT

## 2025-04-22 PROCEDURE — 6360000004 HC RX CONTRAST MEDICATION: Performed by: NURSE PRACTITIONER

## 2025-04-22 PROCEDURE — A9579 GAD-BASE MR CONTRAST NOS,1ML: HCPCS | Performed by: NURSE PRACTITIONER

## 2025-04-22 RX ADMIN — GADOTERIDOL 14 ML: 279.3 INJECTION, SOLUTION INTRAVENOUS at 16:06

## 2025-04-23 ENCOUNTER — RESULTS FOLLOW-UP (OUTPATIENT)
Dept: FAMILY MEDICINE CLINIC | Age: 89
End: 2025-04-23

## 2025-04-23 ENCOUNTER — TELEPHONE (OUTPATIENT)
Dept: FAMILY MEDICINE CLINIC | Age: 89
End: 2025-04-23

## 2025-04-23 NOTE — TELEPHONE ENCOUNTER
Kumar Mauricio, APRN - CNP  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

## 2025-04-24 NOTE — TELEPHONE ENCOUNTER
Pt informed of results . Pt voiced understanding. She isn't concerned about her balance. Won't tell me what she was concerned about.  She does her daily exercise and will think about doing PT and call us back.

## 2025-05-01 ENCOUNTER — ANTI-COAG VISIT (OUTPATIENT)
Age: 89
End: 2025-05-01
Payer: MEDICARE

## 2025-05-01 DIAGNOSIS — Z79.01 ANTICOAGULATED ON COUMADIN: Primary | ICD-10-CM

## 2025-05-01 DIAGNOSIS — Z51.81 ENCOUNTER FOR THERAPEUTIC DRUG MONITORING: ICD-10-CM

## 2025-05-01 DIAGNOSIS — Z79.01 LONG TERM (CURRENT) USE OF ANTICOAGULANTS: ICD-10-CM

## 2025-05-01 LAB — POC INR: 1.7 (ref 0.8–1.2)

## 2025-05-01 PROCEDURE — 99212 OFFICE O/P EST SF 10 MIN: CPT

## 2025-05-01 PROCEDURE — 85610 PROTHROMBIN TIME: CPT

## 2025-05-01 NOTE — PROGRESS NOTES
Medication Management Clinic  [facility]  Anticoagulation Clinic  [phone] (phone)  [phone] (fax)    Ms. Emma Mesa is a 89 y.o.  female with history of PE who presents today for anticoagulation monitoring and adjustment.    Emma states that since her last appointment she has been taking 3 mg daily, instead of 3 mg once weekly and 4 mg all the other days. States she thought the change was to 3 mg every day not just once weekly.  Patient denies s/s bleeding/bruising/swelling/SOB  No blood in urine or stool.  No dietary changes.  No changes in medication/OTC agents/Herbals.  No change in alcohol use or tobacco use.  No change in activity level.  Patient denies falls.  No vomiting/diarrhea or acute illness.   No Procedures scheduled in the future at this time.      Assessment:   Lab Results   Component Value Date    INR 1.70 (H) 05/01/2025    INR 3.00 (H) 04/15/2025    INR 3.18 (H) 03/16/2025     INR subtherapeutic   Recent Labs     05/01/25  1408   INR 1.70*     INR goal: 2.0-3.0    Plan:  6 mg today then change Coumadin 3 mg MoFr and 4 mg SuTuWeThSa.  Recheck INR in 2 week(s).    Patient reminded to call the Anticoagulation Clinic with signs or symptoms of bleeding or with any medication changes.  Patient given instructions utilizing the teach back method.    After visit summary printed and reviewed with patient.      Discharged ambulatory in no apparent distress.    For Pharmacy Admin Tracking Only    Intervention Detail: Dose Adjustment: 1, reason: Therapy Optimization  Total # of Interventions Recommended: 1  Total # of Interventions Accepted: 1  Time Spent (min): 20

## 2025-05-15 ENCOUNTER — OFFICE VISIT (OUTPATIENT)
Dept: FAMILY MEDICINE CLINIC | Age: 89
End: 2025-05-15
Payer: MEDICARE

## 2025-05-15 ENCOUNTER — ANTI-COAG VISIT (OUTPATIENT)
Age: 89
End: 2025-05-15
Payer: MEDICARE

## 2025-05-15 VITALS
RESPIRATION RATE: 14 BRPM | OXYGEN SATURATION: 97 % | DIASTOLIC BLOOD PRESSURE: 68 MMHG | TEMPERATURE: 96.8 F | HEIGHT: 66 IN | BODY MASS INDEX: 24.71 KG/M2 | HEART RATE: 67 BPM | SYSTOLIC BLOOD PRESSURE: 124 MMHG

## 2025-05-15 DIAGNOSIS — Z79.01 LONG TERM (CURRENT) USE OF ANTICOAGULANTS: ICD-10-CM

## 2025-05-15 DIAGNOSIS — R11.0 NAUSEA: ICD-10-CM

## 2025-05-15 DIAGNOSIS — H81.10 BENIGN PAROXYSMAL POSITIONAL VERTIGO, UNSPECIFIED LATERALITY: ICD-10-CM

## 2025-05-15 DIAGNOSIS — R42 DIZZINESS: Primary | ICD-10-CM

## 2025-05-15 DIAGNOSIS — Z79.01 ANTICOAGULATED ON COUMADIN: Primary | ICD-10-CM

## 2025-05-15 DIAGNOSIS — Z79.01 CHRONIC ANTICOAGULATION: ICD-10-CM

## 2025-05-15 DIAGNOSIS — Z51.81 ENCOUNTER FOR THERAPEUTIC DRUG MONITORING: ICD-10-CM

## 2025-05-15 DIAGNOSIS — G91.9 HYDROCEPHALUS, UNSPECIFIED TYPE (HCC): ICD-10-CM

## 2025-05-15 DIAGNOSIS — I10 PRIMARY HYPERTENSION: ICD-10-CM

## 2025-05-15 LAB — POC INR: 2.3 (ref 0.8–1.2)

## 2025-05-15 PROCEDURE — 85610 PROTHROMBIN TIME: CPT

## 2025-05-15 PROCEDURE — 99215 OFFICE O/P EST HI 40 MIN: CPT | Performed by: NURSE PRACTITIONER

## 2025-05-15 PROCEDURE — 93000 ELECTROCARDIOGRAM COMPLETE: CPT | Performed by: NURSE PRACTITIONER

## 2025-05-15 PROCEDURE — 1123F ACP DISCUSS/DSCN MKR DOCD: CPT | Performed by: NURSE PRACTITIONER

## 2025-05-15 PROCEDURE — 99211 OFF/OP EST MAY X REQ PHY/QHP: CPT

## 2025-05-15 PROCEDURE — 1159F MED LIST DOCD IN RCRD: CPT | Performed by: NURSE PRACTITIONER

## 2025-05-15 RX ORDER — ONDANSETRON 4 MG/1
4 TABLET, FILM COATED ORAL 3 TIMES DAILY PRN
Qty: 30 TABLET | Refills: 0 | Status: SHIPPED | OUTPATIENT
Start: 2025-05-15

## 2025-05-15 ASSESSMENT — ENCOUNTER SYMPTOMS
GASTROINTESTINAL NEGATIVE: 1
BACK PAIN: 1
RESPIRATORY NEGATIVE: 1

## 2025-05-15 NOTE — PROGRESS NOTES
Medication Management Clinic  [facility]  Anticoagulation Clinic  [phone] (phone)  [phone] (fax)    Ms. Emma Mesa is a 89 y.o.  female with history of PE who presents today for anticoagulation monitoring and adjustment.    States she takes it as the chart stated this time.  Patient denies s/s bleeding/bruising/swelling/SOB  No blood in urine or stool.  No dietary changes.  No changes in medication/OTC agents/Herbals.  No change in alcohol use or tobacco use.  Less activity the last couple weeks since she has been dizzy. Saw PCP for this earlier today.  Patient denies falls.  No vomiting/diarrhea or acute illness.   No Procedures scheduled in the future at this time.    Assessment:   Lab Results   Component Value Date    INR 2.30 (H) 05/15/2025    INR 1.70 (H) 05/01/2025    INR 3.00 (H) 04/15/2025     INR therapeutic   Recent Labs     05/15/25  1321   INR 2.30*     INR goal: 2.0-3.0    Plan:  Continue Coumadin 3 mg MoFr and 4 mg SuTuWeThSa.  Recheck INR in 2 week(s).  Patient reminded to call the Anticoagulation Clinic with any signs or symptoms of bleeding or with any medication changes.  Patient given instructions utilizing the teach back method.    After visit summary printed and reviewed with patient.      Discharged ambulatory in no apparent distress with daughter.    For Pharmacy Admin Tracking Only  Time Spent (min): 15

## 2025-05-15 NOTE — PROGRESS NOTES
[facility] MEDICINE  [address]  [place] [address]  Dept: [phone]  Dept Fax: [phone]  Loc: [phone]    Emma Mesa is a 89 y.o. femalewho presents today for her medical conditions/complaints as noted below.  Emma Barriga c/o of Follow-up (Dizziness, started 3 weeks ago, not getting any better. )      :     HPI    Pt with c/o of dizziness for 3 weeks   Had an MRI of brain 4/2025 which identified chronic ischemic changes and past infarct.     Pt holly 3 weeks ago started having intermittent dizziness and nasuse with position changes.    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      MRI BRAIN W WO CONTRAST  Narrative: PROCEDURE: MRI BRAIN W WO CONTRAST    CLINICAL INFORMATION Other abnormalities of gait and mobility.    COMPARISON: CT scan of the brain dated 12/4/2013.    TECHNIQUE: Multiplanar and multiple spin echo T1 and T2-weighted images were  obtained through the brain before and after the administration of intravenous  contrast.    FINDINGS:    The diffusion-weighted images are normal. The brain volume is reduced. There are  no intra-or extra-axial collections.  There is mild dilatation of the lateral  ventricles. There is no midline shift or mass effect.    On the FLAIR and T2-weighted sequences, there is

## 2025-05-23 ENCOUNTER — HOSPITAL ENCOUNTER (OUTPATIENT)
Dept: PHYSICAL THERAPY | Age: 89
Setting detail: THERAPIES SERIES
Discharge: HOME | End: 2025-05-23
Payer: MEDICARE

## 2025-05-23 PROCEDURE — 97162 PT EVAL MOD COMPLEX 30 MIN: CPT

## 2025-05-23 NOTE — PROGRESS NOTES
[facility]  PHYSICAL THERAPY  [x] EVALUATION  [] DAILY NOTE (LAND) [] DAILY NOTE (AQUATIC ) [] PROGRESS NOTE [] DISCHARGE NOTE    [x] OUTPATIENT REHABILITATION [facility]   [] [facility]    [] [facility]   [] [facility]    Date: 2025  Patient Name:  Emma Mesa  : 3/1/1934  MRN: [id number]  CSN: [id number]    Referring Practitioner Kumar Mauricio APRN - CNP [id number]      Diagnosis  Diagnoses       R42 (ICD-10-CM) - Dizziness    H81.10 (ICD-10-CM) - Benign paroxysmal positional vertigo, unspecified laterality           Treatment Diagnosis R42   Dizziness and giddiness  Frequent Falls   Date of Evaluation 25   Additional Pertinent History     Emma Mesa has a past medical history of Arthritis, Blood circulation, collateral, Breathing difficulty, Chronic kidney disease, DVT (deep venous thrombosis) (HCC), History of blood transfusion, Hx of blood clots, Hyperlipidemia, Hypertension, Lumbar radiculitis, Pneumonia, Prolonged emergence from general anesthesia, Thyroid disease, and Unspecified diseases of blood and blood-forming organs.  she has a past surgical history that includes Varicose vein surgery (??); Vena Cava Filter Placement (2013); Colonoscopy; skin biopsy; Cystocopy (2015); Cataract removal (Right, 2020); Cataract removal (Left, 2020); Hysterectomy (??); Ovary removal (); Cystoscopy (N/A, 2022); and Spine surgery (Left, 2023).     Allergies Allergies   Allergen Reactions    Bactrim [Sulfamethoxazole-Trimethoprim] Nausea Only    Milk-Related Compounds      Feels worse and has bladder problems with milk.    Tramadol Other (See Comments)     Patient felt \"not right\"    Codeine Nausea And Vomiting      Medications   Current Outpatient Medications:     ondansetron (ZOFRAN) 4 MG tablet, Take 1 tablet by mouth 3 times daily as needed for Nausea or Vomiting, Disp: 30 tablet, Rfl: 0

## 2025-05-27 ENCOUNTER — TELEPHONE (OUTPATIENT)
Dept: PHYSICAL THERAPY | Age: 89
End: 2025-05-27

## 2025-05-27 NOTE — TELEPHONE ENCOUNTER
Per patient request at evaluation, I am to call her to set up scheduling. I phoned and left VM with clerical phone number.

## 2025-05-29 ENCOUNTER — ANTI-COAG VISIT (OUTPATIENT)
Age: 89
End: 2025-05-29
Payer: MEDICARE

## 2025-05-29 DIAGNOSIS — Z51.81 ENCOUNTER FOR THERAPEUTIC DRUG MONITORING: ICD-10-CM

## 2025-05-29 DIAGNOSIS — Z79.01 LONG TERM (CURRENT) USE OF ANTICOAGULANTS: ICD-10-CM

## 2025-05-29 DIAGNOSIS — Z79.01 ANTICOAGULATED ON COUMADIN: Primary | ICD-10-CM

## 2025-05-29 LAB — POC INR: 2.9 (ref 0.8–1.2)

## 2025-05-29 PROCEDURE — 85610 PROTHROMBIN TIME: CPT

## 2025-05-29 PROCEDURE — 99211 OFF/OP EST MAY X REQ PHY/QHP: CPT

## 2025-05-29 NOTE — PROGRESS NOTES
Medication Management Clinic  [facility]  Anticoagulation Clinic  [phone] (phone)  [phone] (fax)    Ms. Emma Mesa is a 89 y.o.  female with history of PE/long-term use of anticoagulants, per referral from BENNIE Rain, who presents today for Warfarin monitoring and adjustment (2 week visit).    Patient verifies current tablet strength. Followed printed instructions for dose.  No missed or extra doses.  Patient denies bleeding/bruising. Continues to have red spots on cheeks/bridge of nose - has had for over a year (come and go). Has usual SOB.  Has usual swelling of legs toward evening - wears knee-high compression socks bilat.  No blood in urine or stool.  No dietary changes.  No changes in medication/OTC agents/herbals.  No change in alcohol use or tobacco use.  Change in activity level: decreased.  Patient denies falls.  No vomiting/diarrhea or acute illness. Having some morning dizziness - had PT eval.  No procedures scheduled in the future at this time.      Assessment:       INR goal: 2.0-3.0  Lab Results   Component Value Date    INR 2.90 (H) 05/29/2025    INR 2.30 (H) 05/15/2025    INR 1.70 (H) 05/01/2025     INR therapeutic   Recent Labs     05/29/25  1124   INR 2.90*      Plan:  POCT INR performed/result reviewed.  Continue PO Coumadin 3 mg MF, 4 mg TWThSS.  Recheck INR in 3 week(s).  Patient reminded to call the Anticoagulation Clinic with any signs or symptoms of bleeding or with any medication changes.  Patient given instructions utilizing the teach back method.     After visit summary printed and reviewed with patient.      Discharged ambulatory in no apparent distress, with walking stick and daughter.    For Pharmacy Admin Tracking Only    Time Spent (min): 20

## 2025-06-19 ENCOUNTER — ANTI-COAG VISIT (OUTPATIENT)
Age: 89
End: 2025-06-19
Payer: MEDICARE

## 2025-06-19 DIAGNOSIS — Z79.01 ANTICOAGULATED ON COUMADIN: Primary | ICD-10-CM

## 2025-06-19 DIAGNOSIS — Z51.81 ENCOUNTER FOR THERAPEUTIC DRUG MONITORING: ICD-10-CM

## 2025-06-19 DIAGNOSIS — Z79.01 LONG TERM (CURRENT) USE OF ANTICOAGULANTS: ICD-10-CM

## 2025-06-19 LAB — POC INR: 3.5 (ref 0.8–1.2)

## 2025-06-19 PROCEDURE — 99212 OFFICE O/P EST SF 10 MIN: CPT

## 2025-06-19 PROCEDURE — 85610 PROTHROMBIN TIME: CPT

## 2025-06-19 NOTE — PROGRESS NOTES
Medication Management Clinic  [facility]  Anticoagulation Clinic  [phone] (phone)  [phone] (fax)    Ms. Emma Mesa is a 89 y.o.  female with history of PE/long-term use of anticoagulants, per referral from BENNIE Rain, who presents today for Warfarin monitoring and adjustment (3 week visit).    Patient verifies current dosing regimen and tablet strength. Also double checks printed instructions each day.  No missed or extra doses.  Patient denies bleeding/SOB. Has usual swelling of lower legs - wears knee-high compression hose bilat. Noted moderate-sized faint purple bruise left forearm running perpendicular to watch band.  States she also carried in groceries with that arm yesterday. Continues to have intermittent red spots on cheeks/bridge of nose (had for over a year).  None on nose today, just cheeks.  No blood in urine or stool.  No dietary changes.  No changes in medication/OTC agents/herbals, except taking less Tylenol.  No change in alcohol use or tobacco use.  Change in activity level: increased - feels better. Has great deal more stress, however.  Patient denies falls.  No vomiting/diarrhea or acute illness.   No procedures scheduled in the future at this time.  Sees neurologist early next month.  States funny feeling in head she was having is gone now.    Assessment:       INR goal: 2.0-3.0  Lab Results   Component Value Date    INR 3.50 (H) 06/19/2025    INR 2.90 (H) 05/29/2025    INR 2.30 (H) 05/15/2025     INR supratherapeutic   Recent Labs     06/19/25  1425   INR 3.50*      Plan:  POCT INR performed/result reviewed.  Hold today, Th, then decrease PO Coumadin to 4 mg MWF, 3 mg TThSS (from 3 mg MF, 4 mg all other days=7.7% decrease).  Recheck INR in 2 week(s). (Report given - orders entered by MITCHELL Sal [facility] PharmD.)  Patient reminded to call the Anticoagulation Clinic with any signs or symptoms of bleeding or with any medication changes.  Patient given

## 2025-07-03 ENCOUNTER — ANTI-COAG VISIT (OUTPATIENT)
Age: 89
End: 2025-07-03
Payer: MEDICARE

## 2025-07-03 DIAGNOSIS — Z79.01 ANTICOAGULATED ON COUMADIN: Primary | ICD-10-CM

## 2025-07-03 DIAGNOSIS — Z51.81 ENCOUNTER FOR THERAPEUTIC DRUG MONITORING: ICD-10-CM

## 2025-07-03 DIAGNOSIS — Z79.01 LONG TERM (CURRENT) USE OF ANTICOAGULANTS: ICD-10-CM

## 2025-07-03 LAB — POC INR: 2.4 (ref 0.8–1.2)

## 2025-07-03 PROCEDURE — 85610 PROTHROMBIN TIME: CPT

## 2025-07-03 PROCEDURE — 99211 OFF/OP EST MAY X REQ PHY/QHP: CPT

## 2025-07-03 NOTE — PROGRESS NOTES
Medication Management Clinic  [facility]  Anticoagulation Clinic  [phone] (phone)  [phone] (fax)    Ms. Emma Mesa is a 89 y.o.  female with history of PE who presents today for anticoagulation monitoring and adjustment.    Patient verifies current dosing regimen and tablet strength.  No missed or extra doses.  Patient denies s/s bleeding/bruising/swelling/SOB  No blood in urine or stool.  No dietary changes.  No changes in medication/OTC agents/Herbals.  No change in alcohol use or tobacco use.  No change in activity level.  Patient denies falls.  No vomiting/diarrhea or acute illness.   No Procedures scheduled in the future at this time.      Assessment:   Lab Results   Component Value Date    INR 2.40 (H) 07/03/2025    INR 3.50 (H) 06/19/2025    INR 2.90 (H) 05/29/2025     INR therapeutic   Recent Labs     07/03/25  1452   INR 2.40*     Patient presents with therapeutic INR after dose decrease (7.7%) made at last appointment.     INR goal: 2.0-3.0    Plan:  Continue Coumadin 4 mg MoWeFr and 3 mg SuTuThSa.  Recheck INR in 3 week(s).  Patient reminded to call the Anticoagulation Clinic with any signs or symptoms of bleeding or with any medication changes.  Patient given instructions utilizing the teach back method.    After visit summary printed and reviewed with patient.      Discharged ambulatory in no apparent distress.    For Pharmacy Admin Tracking Only  Time Spent (min): 15

## 2025-07-15 ENCOUNTER — OFFICE VISIT (OUTPATIENT)
Dept: NEUROLOGY | Age: 89
End: 2025-07-15
Payer: MEDICARE

## 2025-07-15 VITALS
HEIGHT: 65 IN | DIASTOLIC BLOOD PRESSURE: 54 MMHG | OXYGEN SATURATION: 93 % | BODY MASS INDEX: 24.72 KG/M2 | WEIGHT: 148.38 LBS | SYSTOLIC BLOOD PRESSURE: 126 MMHG | HEART RATE: 68 BPM

## 2025-07-15 DIAGNOSIS — R27.8 SENSORY ATAXIA: ICD-10-CM

## 2025-07-15 DIAGNOSIS — R26.89 BALANCE PROBLEM: Primary | ICD-10-CM

## 2025-07-15 PROCEDURE — 1123F ACP DISCUSS/DSCN MKR DOCD: CPT | Performed by: PSYCHIATRY & NEUROLOGY

## 2025-07-15 PROCEDURE — 1159F MED LIST DOCD IN RCRD: CPT | Performed by: PSYCHIATRY & NEUROLOGY

## 2025-07-15 PROCEDURE — 99205 OFFICE O/P NEW HI 60 MIN: CPT | Performed by: PSYCHIATRY & NEUROLOGY

## 2025-07-15 NOTE — PATIENT INSTRUCTIONS
Vitamin B12, folate  Vitamin B6 level  Copper level  Manganese level  You need to use cane or walker at all times.   Offered referral to PT for gait safety, balance issues, patient wishing to hold off at this time.   Call with any new symptoms or concerns.   Follow up as needed

## 2025-07-16 ENCOUNTER — HOSPITAL ENCOUNTER (OUTPATIENT)
Age: 89
Discharge: HOME | End: 2025-07-16
Payer: MEDICARE

## 2025-07-16 DIAGNOSIS — R27.8 SENSORY ATAXIA: ICD-10-CM

## 2025-07-16 DIAGNOSIS — R26.89 BALANCE PROBLEM: ICD-10-CM

## 2025-07-16 LAB
FOLATE SERPL-MCNC: 13.4 NG/ML (ref 4.6–34.8)
VIT B12 SERPL-MCNC: 336 PG/ML (ref 232–1245)

## 2025-07-16 PROCEDURE — 83785 ASSAY OF MANGANESE: CPT

## 2025-07-16 PROCEDURE — 82525 ASSAY OF COPPER: CPT

## 2025-07-16 PROCEDURE — 82746 ASSAY OF FOLIC ACID SERUM: CPT

## 2025-07-16 PROCEDURE — 82607 VITAMIN B-12: CPT

## 2025-07-16 PROCEDURE — 84207 ASSAY OF VITAMIN B-6: CPT

## 2025-07-16 PROCEDURE — 36415 COLL VENOUS BLD VENIPUNCTURE: CPT

## 2025-07-17 ENCOUNTER — RESULTS FOLLOW-UP (OUTPATIENT)
Dept: NEUROLOGY | Age: 89
End: 2025-07-17

## 2025-07-17 NOTE — PROGRESS NOTES
Chief Complaint   Patient presents with    Consultation/Dizziness     Emma Mesa is a 89 y.o. female who presents today for evaluation of dizziness/ balance problem for at least the past 3 years that has progressively gotten worse. She has fallen. Her last fall was 2 months ago. She fell off a step stool.  MRI brain W/WO contrast done 4/22/25 showed Mild atrophy and dilatation of the lateral ventricles. Probable ischemic changes in the white matter, right and left basal ganglia. Old lacunar infarct in the right cerebellar hemisphere.Otherwise negative MRI scan of the brain with and without intravenous contrast.  She can fall in all directions.  No shuffling when she walks. No urinary incontinence. Her memory is intact. States she has taken Coumadin, for blood clots, since the year 2012. She was referred to PT, however did not pursue it. She   denies chest pain. No shortness of breath, no neck pain. No vision changes. No dysphagia. No fever. No rash. No weight loss.        Past Medical History:   Diagnosis Date    Arthritis     wrist, back, neck, foot    Blood circulation, collateral     Breathing difficulty     Chronic kidney disease     DVT (deep venous thrombosis) (HCC)     History of blood transfusion 1960's    child birth    Hx of blood clots 2013???    left leg    Hyperlipidemia     Hypertension     Lumbar radiculitis 10/30/2023    Pneumonia     Prolonged emergence from general anesthesia     with hysterectomy??    Thyroid disease     Unspecified diseases of blood and blood-forming organs        Patient Active Problem List   Diagnosis    Hypertension    Thyroid disease    Anticoagulated on Coumadin    Duplicated collecting system    Presence of IVC filter    Encounter for therapeutic drug monitoring    Spinal stenosis of lumbar region without neurogenic claudication    Long term (current) use of anticoagulants    History of pulmonary embolism    Age-related osteoporosis without current pathological

## 2025-07-18 NOTE — TELEPHONE ENCOUNTER
Spoke to the patient regarding results. Verbalized understanding. No additional questions at this time.

## 2025-07-19 LAB
COPPER SERPL-MCNC: 90.3 UG/DL (ref 80–155)
MISC. #1 REFERENCE GROUP TEST: NORMAL

## 2025-07-22 LAB — PYRIDOXAL PHOS SERPL-SCNC: 14.3 NMOL/L (ref 20–125)

## 2025-07-28 ENCOUNTER — ANTI-COAG VISIT (OUTPATIENT)
Age: 89
End: 2025-07-28
Payer: MEDICARE

## 2025-07-28 DIAGNOSIS — Z79.01 LONG TERM (CURRENT) USE OF ANTICOAGULANTS: ICD-10-CM

## 2025-07-28 DIAGNOSIS — Z79.01 ANTICOAGULATED ON COUMADIN: Primary | ICD-10-CM

## 2025-07-28 DIAGNOSIS — Z51.81 ENCOUNTER FOR THERAPEUTIC DRUG MONITORING: ICD-10-CM

## 2025-07-28 LAB — POC INR: 1.8 (ref 0.8–1.2)

## 2025-07-28 PROCEDURE — 85610 PROTHROMBIN TIME: CPT

## 2025-07-28 PROCEDURE — 99211 OFF/OP EST MAY X REQ PHY/QHP: CPT

## 2025-07-28 NOTE — PROGRESS NOTES
Medication Management Clinic  [facility]  Anticoagulation Clinic  [phone] (phone)  [phone] (fax)    Ms. Emma Mesa is a 89 y.o.  female with history of PE/long-term use of anticoagulants, per referral from BENNIE Rain, who presents today for Warfarin monitoring and adjustment (4 days late for 3 week visit - no show 7/24).    Patient verifies current tablet strength.  Followed printed instructions for dose.  No missed or extra doses.  Patient denies bleeding/bruising/swelling (wears knee-high compression socks bilat.). Has usual SOB. Noted usual red spots on cheeks.  States 2 providers have told her it's from Coumadin.  Sometimes they break open and bleed.  She's considering getting dermatology opinion.  No blood in urine or stool.  No dietary changes. Appetite remains suppressed.  No changes in medication/OTC agents/herbals, except took 2 Tylenol today for chronic back pain - normally takes none.  No change in alcohol use or tobacco use.  Change in activity level: increased lately.  Patient denies falls.  No vomiting/diarrhea or acute illness.   No procedures scheduled in the future at this time.      Assessment:       INR goal: 2.0-3.0  Lab Results   Component Value Date    INR 1.80 (H) 07/28/2025    INR 2.40 (H) 07/03/2025    INR 3.50 (H) 06/19/2025     INR subtherapeutic   Recent Labs     07/28/25  1510   INR 1.80*      Plan:  POCT INR performed/result reviewed.  6 mg today, M (per policy), then continue PO Coumadin 4 mg MWF, 3 mg TThSS.  Recheck INR in 3 week(s), per policy.  Patient reminded to call the Anticoagulation Clinic with any signs or symptoms of bleeding or with any medication changes.  Patient given instructions utilizing the teach back method.     After visit summary printed and reviewed with patient.    Discharged ambulatory in no apparent distress, with walking stick.    For Pharmacy Admin Tracking Only    Intervention Detail: Dose Adjustment: 1, reason:

## 2025-07-29 ENCOUNTER — TELEPHONE (OUTPATIENT)
Age: 89
End: 2025-07-29

## 2025-07-29 NOTE — TELEPHONE ENCOUNTER
Patient called and states that she started Vitamin B-12 now is wondering if she should be taking it with her on Coumadin.  Please call the patient back with instructions.

## 2025-08-18 ENCOUNTER — ANTI-COAG VISIT (OUTPATIENT)
Age: 89
End: 2025-08-18
Payer: MEDICARE

## 2025-08-18 DIAGNOSIS — Z79.01 LONG TERM (CURRENT) USE OF ANTICOAGULANTS: ICD-10-CM

## 2025-08-18 DIAGNOSIS — Z79.01 ANTICOAGULATED ON COUMADIN: Primary | ICD-10-CM

## 2025-08-18 DIAGNOSIS — Z51.81 ENCOUNTER FOR THERAPEUTIC DRUG MONITORING: ICD-10-CM

## 2025-08-18 LAB — POC INR: 1.9 (ref 0.8–1.2)

## 2025-08-18 PROCEDURE — 85610 PROTHROMBIN TIME: CPT

## 2025-08-18 PROCEDURE — 99211 OFF/OP EST MAY X REQ PHY/QHP: CPT

## 2025-08-18 RX ORDER — MULTIVITAMIN WITH IRON
50 TABLET ORAL DAILY
COMMUNITY

## 2025-08-18 RX ORDER — UBIDECARENONE 75 MG
100 CAPSULE ORAL DAILY
COMMUNITY

## (undated) DEVICE — SPK10281 JACKSON TABLE KIT: Brand: SPK10281 JACKSON TABLE KIT

## (undated) DEVICE — GLOVE ORANGE PI 7 1/2   MSG9075

## (undated) DEVICE — SOLUTION IV IRRIG WATER 1000ML POUR BRL 2F7114

## (undated) DEVICE — SOLUTION IV 1000ML 0.9% SOD CHL PH 5 INJ USP VIAFLX PLAS

## (undated) DEVICE — PACK-MAJOR

## (undated) DEVICE — GOWN,SIRUS,NONRNF,SETINSLV,XL,20/CS: Brand: MEDLINE

## (undated) DEVICE — APPLICATOR MEDICATED 26 CC SOLUTION CLR STRL CHLORAPREP

## (undated) DEVICE — SOLUTION SCRB 4OZ 4% CHG H2O AIDED FOR PREOPERATIVE SKIN

## (undated) DEVICE — LIQUIBAND RAPID ADHESIVE 36/CS 0.8ML: Brand: MEDLINE

## (undated) DEVICE — CONTAINER,SPECIMEN,PNEU TUBE,4OZ,OR STRL: Brand: MEDLINE

## (undated) DEVICE — INTENDED FOR TISSUE SEPARATION, AND OTHER PROCEDURES THAT REQUIRE A SHARP SURGICAL BLADE TO PUNCTURE OR CUT.: Brand: BARD-PARKER ® CARBON RIB-BACK BLADES

## (undated) DEVICE — KIT KEX152EB-CDS- 15/2 FF WITH CDS: Brand: KYPHPAK® FIRST FRACTURE TRAY

## (undated) DEVICE — BONE BIOPSY DEVICE F07A TAPERED SIZE 2: Brand: MEDTRONIC REUSABLE INSTRUMENTS

## (undated) DEVICE — DRESSING TRNSPAR W5XL4.5IN FLM SHT SEMIPERMEABLE WIND

## (undated) DEVICE — STRIP,CLOSURE,WOUND,MEDI-STRIP,1/2X4: Brand: MEDLINE

## (undated) DEVICE — GOWN,SIRUS,NON REINFRCD,LARGE,SET IN SL: Brand: MEDLINE

## (undated) DEVICE — GUIDEWIRE URO L150CM DIA0.035IN STIFF NIT HYDRPHLC STR TIP

## (undated) DEVICE — SUTURE PERMA-HAND SZ 2-0 L30IN NONABSORBABLE BLK L26MM SH K833H

## (undated) DEVICE — CONE TIP URETERAL CATHETER WITH OPEN-END: Brand: CONE TIP

## (undated) DEVICE — KYPHOPLASTY: Brand: MEDLINE INDUSTRIES, INC.

## (undated) DEVICE — PAD,NON-ADHERENT,3X8,STERILE,LF,1/PK: Brand: MEDLINE

## (undated) DEVICE — CYSTO PACK: Brand: MEDLINE INDUSTRIES, INC.

## (undated) DEVICE — GAUZE,SPONGE,4"X4",12PLY,STERILE,LF,2'S: Brand: MEDLINE

## (undated) DEVICE — SYRINGE MED 10ML LUERLOCK TIP W/O SFTY DISP

## (undated) DEVICE — BONE CEMENT CT01B KYPHON VUE W MIXER: Brand: KYPHON VUE BONE CEMENT AND KYPHON MIXER

## (undated) DEVICE — SOLUTION IRRIG 3000ML 0.9% SOD CHL USP UROMATIC PLAS CONT